# Patient Record
Sex: MALE | Race: OTHER | HISPANIC OR LATINO
[De-identification: names, ages, dates, MRNs, and addresses within clinical notes are randomized per-mention and may not be internally consistent; named-entity substitution may affect disease eponyms.]

---

## 2017-03-17 ENCOUNTER — MED ADMIN CHARGE (OUTPATIENT)
Age: 57
End: 2017-03-17

## 2017-04-12 ENCOUNTER — EMERGENCY (EMERGENCY)
Facility: HOSPITAL | Age: 57
LOS: 1 days | Discharge: HOME | End: 2017-04-12
Attending: EMERGENCY MEDICINE
Payer: COMMERCIAL

## 2017-04-12 VITALS
WEIGHT: 160.06 LBS | TEMPERATURE: 98 F | OXYGEN SATURATION: 100 % | HEART RATE: 75 BPM | HEIGHT: 66 IN | SYSTOLIC BLOOD PRESSURE: 130 MMHG | RESPIRATION RATE: 18 BRPM | DIASTOLIC BLOOD PRESSURE: 85 MMHG

## 2017-04-12 DIAGNOSIS — M25.511 PAIN IN RIGHT SHOULDER: ICD-10-CM

## 2017-04-12 PROCEDURE — 99284 EMERGENCY DEPT VISIT MOD MDM: CPT | Mod: 25

## 2017-04-12 PROCEDURE — 99283 EMERGENCY DEPT VISIT LOW MDM: CPT | Mod: 25

## 2017-04-12 PROCEDURE — 99053 MED SERV 10PM-8AM 24 HR FAC: CPT

## 2017-04-12 PROCEDURE — 96372 THER/PROPH/DIAG INJ SC/IM: CPT

## 2017-04-12 PROCEDURE — 73030 X-RAY EXAM OF SHOULDER: CPT

## 2017-04-12 PROCEDURE — 73030 X-RAY EXAM OF SHOULDER: CPT | Mod: 26,RT

## 2017-04-12 RX ORDER — METHOCARBAMOL 500 MG/1
1500 TABLET, FILM COATED ORAL ONCE
Qty: 0 | Refills: 0 | Status: COMPLETED | OUTPATIENT
Start: 2017-04-12 | End: 2017-04-12

## 2017-04-12 RX ORDER — KETOROLAC TROMETHAMINE 30 MG/ML
60 SYRINGE (ML) INJECTION ONCE
Qty: 0 | Refills: 0 | Status: DISCONTINUED | OUTPATIENT
Start: 2017-04-12 | End: 2017-04-12

## 2017-04-12 RX ORDER — IBUPROFEN 200 MG
1 TABLET ORAL
Qty: 28 | Refills: 0 | OUTPATIENT
Start: 2017-04-12 | End: 2017-04-19

## 2017-04-12 RX ADMIN — METHOCARBAMOL 1500 MILLIGRAM(S): 500 TABLET, FILM COATED ORAL at 06:06

## 2017-04-12 RX ADMIN — Medication 60 MILLIGRAM(S): at 06:06

## 2017-04-12 NOTE — ED PROVIDER NOTE - PHYSICAL EXAMINATION
Musculoskeletal: +TTP of the right posterolateral shoulder with decreased active/passive ROM due to reported pain, 2+ radial pulse, +drop arm test

## 2017-04-12 NOTE — ED PROVIDER NOTE - OBJECTIVE STATEMENT
57M presents to the ED c/o worsening right shoulder pain x 1 week. 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 Pt otherwise denies numbness/tingling, recent trauma/injury and has no other complaints.

## 2017-04-20 ENCOUNTER — APPOINTMENT (OUTPATIENT)
Dept: ORTHOPEDIC SURGERY | Facility: CLINIC | Age: 57
End: 2017-04-20

## 2017-04-20 VITALS
WEIGHT: 160 LBS | HEART RATE: 85 BPM | DIASTOLIC BLOOD PRESSURE: 81 MMHG | BODY MASS INDEX: 29.44 KG/M2 | HEIGHT: 62 IN | TEMPERATURE: 97.9 F | SYSTOLIC BLOOD PRESSURE: 129 MMHG

## 2017-04-20 DIAGNOSIS — Z83.79 FAMILY HISTORY OF OTHER DISEASES OF THE DIGESTIVE SYSTEM: ICD-10-CM

## 2017-04-20 DIAGNOSIS — Z83.3 FAMILY HISTORY OF DIABETES MELLITUS: ICD-10-CM

## 2017-04-20 DIAGNOSIS — Z82.49 FAMILY HISTORY OF ISCHEMIC HEART DISEASE AND OTHER DISEASES OF THE CIRCULATORY SYSTEM: ICD-10-CM

## 2017-04-20 DIAGNOSIS — M19.019 PRIMARY OSTEOARTHRITIS, UNSPECIFIED SHOULDER: ICD-10-CM

## 2017-04-20 DIAGNOSIS — M75.51 BURSITIS OF RIGHT SHOULDER: ICD-10-CM

## 2017-04-20 DIAGNOSIS — Z78.9 OTHER SPECIFIED HEALTH STATUS: ICD-10-CM

## 2017-05-12 ENCOUNTER — NON-APPOINTMENT (OUTPATIENT)
Age: 57
End: 2017-05-12

## 2017-05-12 ENCOUNTER — APPOINTMENT (OUTPATIENT)
Dept: INTERNAL MEDICINE | Facility: CLINIC | Age: 57
End: 2017-05-12

## 2017-05-12 VITALS — DIASTOLIC BLOOD PRESSURE: 78 MMHG | HEART RATE: 70 BPM | SYSTOLIC BLOOD PRESSURE: 118 MMHG | RESPIRATION RATE: 12 BRPM

## 2017-05-12 VITALS — HEIGHT: 64 IN | BODY MASS INDEX: 26.63 KG/M2 | WEIGHT: 156 LBS

## 2017-05-12 DIAGNOSIS — Z78.9 OTHER SPECIFIED HEALTH STATUS: ICD-10-CM

## 2017-05-12 DIAGNOSIS — Z84.89 FAMILY HISTORY OF OTHER SPECIFIED CONDITIONS: ICD-10-CM

## 2017-05-12 DIAGNOSIS — Z82.49 FAMILY HISTORY OF ISCHEMIC HEART DISEASE AND OTHER DISEASES OF THE CIRCULATORY SYSTEM: ICD-10-CM

## 2017-05-12 DIAGNOSIS — Z83.79 FAMILY HISTORY OF OTHER DISEASES OF THE DIGESTIVE SYSTEM: ICD-10-CM

## 2017-05-15 LAB
25(OH)D3 SERPL-MCNC: 19.5 NG/ML
ALBUMIN SERPL ELPH-MCNC: 4.2 G/DL
ALP BLD-CCNC: 83 U/L
ALT SERPL-CCNC: 14 U/L
ANION GAP SERPL CALC-SCNC: 14 MMOL/L
APPEARANCE: CLEAR
AST SERPL-CCNC: 19 U/L
BASOPHILS # BLD AUTO: 0.03 K/UL
BASOPHILS NFR BLD AUTO: 0.8 %
BILIRUB SERPL-MCNC: 0.2 MG/DL
BILIRUBIN URINE: NEGATIVE
BLOOD URINE: NEGATIVE
BUN SERPL-MCNC: 17 MG/DL
CALCIUM SERPL-MCNC: 9 MG/DL
CHLORIDE SERPL-SCNC: 106 MMOL/L
CHOLEST SERPL-MCNC: 240 MG/DL
CHOLEST/HDLC SERPL: 5 RATIO
CO2 SERPL-SCNC: 23 MMOL/L
COLOR: YELLOW
CREAT SERPL-MCNC: 0.85 MG/DL
CREAT SPEC-SCNC: 81 MG/DL
EOSINOPHIL # BLD AUTO: 0.17 K/UL
EOSINOPHIL NFR BLD AUTO: 4.3 %
GLUCOSE QUALITATIVE U: NORMAL MG/DL
GLUCOSE SERPL-MCNC: 75 MG/DL
HBA1C MFR BLD HPLC: 6.2 %
HCT VFR BLD CALC: 35.9 %
HDLC SERPL-MCNC: 47 MG/DL
HGB BLD-MCNC: 11.5 G/DL
IMM GRANULOCYTES NFR BLD AUTO: 0.3 %
KETONES URINE: NEGATIVE
LDLC SERPL CALC-MCNC: 177 MG/DL
LEUKOCYTE ESTERASE URINE: NEGATIVE
LYMPHOCYTES # BLD AUTO: 2.22 K/UL
LYMPHOCYTES NFR BLD AUTO: 56.6 %
MAN DIFF?: NORMAL
MCHC RBC-ENTMCNC: 28.2 PG
MCHC RBC-ENTMCNC: 32 GM/DL
MCV RBC AUTO: 88 FL
MICROALBUMIN 24H UR DL<=1MG/L-MCNC: <0.3 MG/DL
MICROALBUMIN/CREAT 24H UR-RTO: NORMAL
MONOCYTES # BLD AUTO: 0.41 K/UL
MONOCYTES NFR BLD AUTO: 10.5 %
NEUTROPHILS # BLD AUTO: 1.08 K/UL
NEUTROPHILS NFR BLD AUTO: 27.5 %
NITRITE URINE: NEGATIVE
PH URINE: 6.5
PLATELET # BLD AUTO: 222 K/UL
POTASSIUM SERPL-SCNC: 4.4 MMOL/L
PROT SERPL-MCNC: 7.4 G/DL
PROTEIN URINE: NEGATIVE MG/DL
PSA SERPL-MCNC: 1.37 NG/ML
RBC # BLD: 4.08 M/UL
RBC # FLD: 14.9 %
SODIUM SERPL-SCNC: 143 MMOL/L
SPECIFIC GRAVITY URINE: 1.02
T4 FREE SERPL-MCNC: 1.3 NG/DL
TRIGL SERPL-MCNC: 81 MG/DL
TSH SERPL-ACNC: 1.62 UIU/ML
UROBILINOGEN URINE: NORMAL MG/DL
WBC # FLD AUTO: 3.92 K/UL

## 2017-06-14 ENCOUNTER — APPOINTMENT (OUTPATIENT)
Dept: INTERNAL MEDICINE | Facility: CLINIC | Age: 57
End: 2017-06-14

## 2017-06-14 VITALS — DIASTOLIC BLOOD PRESSURE: 68 MMHG | RESPIRATION RATE: 12 BRPM | SYSTOLIC BLOOD PRESSURE: 110 MMHG | HEART RATE: 68 BPM

## 2017-06-14 DIAGNOSIS — S61.219A LACERATION W/OUT FOREIGN BODY OF UNSPECIFIED FINGER W/OUT DAMAGE TO NAIL, INITIAL ENCOUNTER: ICD-10-CM

## 2017-06-23 ENCOUNTER — APPOINTMENT (OUTPATIENT)
Dept: INTERNAL MEDICINE | Facility: CLINIC | Age: 57
End: 2017-06-23

## 2017-06-23 VITALS — WEIGHT: 156 LBS | HEIGHT: 64 IN | BODY MASS INDEX: 26.63 KG/M2

## 2017-06-23 VITALS — SYSTOLIC BLOOD PRESSURE: 108 MMHG | DIASTOLIC BLOOD PRESSURE: 72 MMHG

## 2017-06-23 DIAGNOSIS — Z48.02 ENCOUNTER FOR REMOVAL OF SUTURES: ICD-10-CM

## 2017-06-23 DIAGNOSIS — S61.218D LACERATION W/OUT FOREIGN BODY OF OTHER FINGER W/OUT DAMAGE TO NAIL, SUBSEQUENT ENCOUNTER: ICD-10-CM

## 2017-07-06 ENCOUNTER — APPOINTMENT (OUTPATIENT)
Dept: UROLOGY | Facility: CLINIC | Age: 57
End: 2017-07-06

## 2017-07-06 VITALS
WEIGHT: 156 LBS | SYSTOLIC BLOOD PRESSURE: 129 MMHG | DIASTOLIC BLOOD PRESSURE: 77 MMHG | BODY MASS INDEX: 26.63 KG/M2 | TEMPERATURE: 97.9 F | HEART RATE: 65 BPM | HEIGHT: 64 IN

## 2017-07-06 DIAGNOSIS — R39.12 POOR URINARY STREAM: ICD-10-CM

## 2017-07-06 DIAGNOSIS — Z12.5 ENCOUNTER FOR SCREENING FOR MALIGNANT NEOPLASM OF PROSTATE: ICD-10-CM

## 2017-08-13 ENCOUNTER — TRANSCRIPTION ENCOUNTER (OUTPATIENT)
Age: 57
End: 2017-08-13

## 2017-08-31 ENCOUNTER — APPOINTMENT (OUTPATIENT)
Dept: UROLOGY | Facility: CLINIC | Age: 57
End: 2017-08-31
Payer: COMMERCIAL

## 2017-08-31 VITALS
BODY MASS INDEX: 26.63 KG/M2 | DIASTOLIC BLOOD PRESSURE: 71 MMHG | HEART RATE: 67 BPM | WEIGHT: 156 LBS | SYSTOLIC BLOOD PRESSURE: 126 MMHG | TEMPERATURE: 97.8 F | HEIGHT: 64 IN

## 2017-08-31 DIAGNOSIS — Z98.890 OTHER SPECIFIED POSTPROCEDURAL STATES: ICD-10-CM

## 2017-08-31 DIAGNOSIS — R30.9 PAINFUL MICTURITION, UNSPECIFIED: ICD-10-CM

## 2017-08-31 LAB
BILIRUB UR QL STRIP: NORMAL
CLARITY UR: CLEAR
COLLECTION METHOD: NORMAL
GLUCOSE UR-MCNC: NORMAL
HCG UR QL: 0.2 EU/DL
HGB UR QL STRIP.AUTO: NORMAL
KETONES UR-MCNC: NORMAL
LEUKOCYTE ESTERASE UR QL STRIP: NORMAL
NITRITE UR QL STRIP: NORMAL
PH UR STRIP: 7
PROT UR STRIP-MCNC: NORMAL
SP GR UR STRIP: 1.02

## 2017-08-31 PROCEDURE — 99213 OFFICE O/P EST LOW 20 MIN: CPT | Mod: 25

## 2017-08-31 PROCEDURE — 52000 CYSTOURETHROSCOPY: CPT

## 2017-08-31 PROCEDURE — 81003 URINALYSIS AUTO W/O SCOPE: CPT | Mod: QW

## 2017-09-01 LAB
APPEARANCE: CLEAR
BACTERIA: NEGATIVE
BILIRUBIN URINE: NEGATIVE
BLOOD URINE: NEGATIVE
COLOR: YELLOW
GLUCOSE QUALITATIVE U: NORMAL MG/DL
HYALINE CASTS: 0 /LPF
KETONES URINE: NEGATIVE
LEUKOCYTE ESTERASE URINE: NEGATIVE
MICROSCOPIC-UA: NORMAL
NITRITE URINE: NEGATIVE
PH URINE: 8.5
PROTEIN URINE: NEGATIVE MG/DL
RED BLOOD CELLS URINE: 0 /HPF
SPECIFIC GRAVITY URINE: 1.02
SQUAMOUS EPITHELIAL CELLS: 0 /HPF
UROBILINOGEN URINE: NORMAL MG/DL
WHITE BLOOD CELLS URINE: 0 /HPF

## 2017-09-05 LAB — BACTERIA UR CULT: NORMAL

## 2017-09-08 ENCOUNTER — APPOINTMENT (OUTPATIENT)
Dept: INTERNAL MEDICINE | Facility: CLINIC | Age: 57
End: 2017-09-08
Payer: COMMERCIAL

## 2017-09-08 ENCOUNTER — NON-APPOINTMENT (OUTPATIENT)
Age: 57
End: 2017-09-08

## 2017-09-08 VITALS — HEART RATE: 70 BPM | SYSTOLIC BLOOD PRESSURE: 120 MMHG | DIASTOLIC BLOOD PRESSURE: 76 MMHG | RESPIRATION RATE: 14 BRPM

## 2017-09-08 VITALS — BODY MASS INDEX: 25.95 KG/M2 | HEIGHT: 64 IN | WEIGHT: 152 LBS

## 2017-09-08 PROCEDURE — 99214 OFFICE O/P EST MOD 30 MIN: CPT | Mod: 25

## 2017-09-08 PROCEDURE — 94010 BREATHING CAPACITY TEST: CPT

## 2017-10-12 ENCOUNTER — FORM ENCOUNTER (OUTPATIENT)
Age: 57
End: 2017-10-12

## 2017-10-13 ENCOUNTER — APPOINTMENT (OUTPATIENT)
Dept: RADIOLOGY | Facility: CLINIC | Age: 57
End: 2017-10-13
Payer: COMMERCIAL

## 2017-10-13 ENCOUNTER — APPOINTMENT (OUTPATIENT)
Dept: INTERNAL MEDICINE | Facility: CLINIC | Age: 57
End: 2017-10-13
Payer: COMMERCIAL

## 2017-10-13 ENCOUNTER — OUTPATIENT (OUTPATIENT)
Dept: OUTPATIENT SERVICES | Facility: HOSPITAL | Age: 57
LOS: 1 days | End: 2017-10-13
Payer: COMMERCIAL

## 2017-10-13 ENCOUNTER — NON-APPOINTMENT (OUTPATIENT)
Age: 57
End: 2017-10-13

## 2017-10-13 VITALS — SYSTOLIC BLOOD PRESSURE: 130 MMHG | DIASTOLIC BLOOD PRESSURE: 82 MMHG | HEART RATE: 68 BPM | RESPIRATION RATE: 12 BRPM

## 2017-10-13 VITALS — HEIGHT: 64 IN | BODY MASS INDEX: 25.95 KG/M2 | WEIGHT: 151.99 LBS

## 2017-10-13 DIAGNOSIS — J45.40 MODERATE PERSISTENT ASTHMA, UNCOMPLICATED: ICD-10-CM

## 2017-10-13 PROCEDURE — 71020: CPT | Mod: 26

## 2017-10-13 PROCEDURE — 99214 OFFICE O/P EST MOD 30 MIN: CPT | Mod: 25

## 2017-10-13 PROCEDURE — 71046 X-RAY EXAM CHEST 2 VIEWS: CPT

## 2017-10-13 PROCEDURE — 94010 BREATHING CAPACITY TEST: CPT

## 2017-10-13 PROCEDURE — 90688 IIV4 VACCINE SPLT 0.5 ML IM: CPT

## 2017-10-13 PROCEDURE — G0008: CPT

## 2017-10-13 RX ORDER — METHYLPREDNISOLONE 4 MG/1
4 TABLET ORAL
Qty: 1 | Refills: 3 | Status: DISCONTINUED | COMMUNITY
Start: 2017-09-08 | End: 2017-10-13

## 2017-10-26 ENCOUNTER — APPOINTMENT (OUTPATIENT)
Dept: UROLOGY | Facility: CLINIC | Age: 57
End: 2017-10-26

## 2017-11-10 ENCOUNTER — APPOINTMENT (OUTPATIENT)
Dept: PULMONOLOGY | Facility: CLINIC | Age: 57
End: 2017-11-10
Payer: COMMERCIAL

## 2017-11-10 VITALS
DIASTOLIC BLOOD PRESSURE: 70 MMHG | WEIGHT: 155 LBS | BODY MASS INDEX: 26.46 KG/M2 | SYSTOLIC BLOOD PRESSURE: 118 MMHG | OXYGEN SATURATION: 92 % | HEART RATE: 99 BPM | HEIGHT: 64 IN

## 2017-11-10 DIAGNOSIS — R06.09 OTHER FORMS OF DYSPNEA: ICD-10-CM

## 2017-11-10 PROCEDURE — 94729 DIFFUSING CAPACITY: CPT

## 2017-11-10 PROCEDURE — 94010 BREATHING CAPACITY TEST: CPT

## 2017-11-10 PROCEDURE — 85018 HEMOGLOBIN: CPT | Mod: QW

## 2017-11-10 PROCEDURE — 99204 OFFICE O/P NEW MOD 45 MIN: CPT | Mod: 25

## 2017-11-10 PROCEDURE — 94727 GAS DIL/WSHOT DETER LNG VOL: CPT

## 2017-11-29 ENCOUNTER — APPOINTMENT (OUTPATIENT)
Dept: INTERNAL MEDICINE | Facility: CLINIC | Age: 57
End: 2017-11-29

## 2018-02-09 ENCOUNTER — APPOINTMENT (OUTPATIENT)
Dept: PULMONOLOGY | Facility: CLINIC | Age: 58
End: 2018-02-09

## 2018-07-27 ENCOUNTER — MEDICATION RENEWAL (OUTPATIENT)
Age: 58
End: 2018-07-27

## 2018-08-06 ENCOUNTER — NON-APPOINTMENT (OUTPATIENT)
Age: 58
End: 2018-08-06

## 2018-08-06 ENCOUNTER — APPOINTMENT (OUTPATIENT)
Dept: INTERNAL MEDICINE | Facility: CLINIC | Age: 58
End: 2018-08-06
Payer: COMMERCIAL

## 2018-08-06 ENCOUNTER — RECORD ABSTRACTING (OUTPATIENT)
Age: 58
End: 2018-08-06

## 2018-08-06 VITALS — SYSTOLIC BLOOD PRESSURE: 120 MMHG | RESPIRATION RATE: 14 BRPM | HEART RATE: 78 BPM | DIASTOLIC BLOOD PRESSURE: 78 MMHG

## 2018-08-06 VITALS — WEIGHT: 156 LBS | BODY MASS INDEX: 26.78 KG/M2

## 2018-08-06 DIAGNOSIS — L30.9 DERMATITIS, UNSPECIFIED: ICD-10-CM

## 2018-08-06 PROCEDURE — 94010 BREATHING CAPACITY TEST: CPT

## 2018-08-06 PROCEDURE — G0009: CPT

## 2018-08-06 PROCEDURE — 36415 COLL VENOUS BLD VENIPUNCTURE: CPT

## 2018-08-06 PROCEDURE — 99214 OFFICE O/P EST MOD 30 MIN: CPT | Mod: 25

## 2018-08-06 PROCEDURE — 90670 PCV13 VACCINE IM: CPT

## 2018-08-06 RX ORDER — ALBUTEROL SULFATE 90 UG/1
108 (90 BASE) AEROSOL, METERED RESPIRATORY (INHALATION)
Qty: 1 | Refills: 5 | Status: ACTIVE | COMMUNITY
Start: 2017-11-10 | End: 1900-01-01

## 2018-08-06 RX ORDER — PAROXETINE HYDROCHLORIDE 10 MG/1
10 TABLET, FILM COATED ORAL DAILY
Qty: 30 | Refills: 2 | Status: DISCONTINUED | COMMUNITY
Start: 2017-10-13 | End: 2018-08-06

## 2018-08-06 RX ORDER — CIPROFLOXACIN HYDROCHLORIDE 500 MG/1
500 TABLET, FILM COATED ORAL TWICE DAILY
Qty: 6 | Refills: 0 | Status: DISCONTINUED | COMMUNITY
Start: 2017-08-31 | End: 2018-08-06

## 2018-08-06 NOTE — ASSESSMENT
[FreeTextEntry1] : asthma persistent\par restart meds breo and proair\par see uro for issues with bladder/prostate\par has been taking meds for it not completely better\par exzema cream\par neck pain nsaids\par pneumovax given

## 2018-08-06 NOTE — HISTORY OF PRESENT ILLNESS
[FreeTextEntry1] : patient here for follow up medical issues\par due for cpe end of month\par has asthma in the past [de-identified] : patient here for asthma follow up\par patient has been at baseline.\par still has asthma work related\par also having issues with urine flow, did not follow up with gu\par patient has no chest pain sob nvd or palpitations\par also has some neck pain and a rash of his hand

## 2018-08-07 ENCOUNTER — CHART COPY (OUTPATIENT)
Age: 58
End: 2018-08-07

## 2018-08-07 LAB
25(OH)D3 SERPL-MCNC: 21.2 NG/ML
ALBUMIN SERPL ELPH-MCNC: 4.5 G/DL
ALP BLD-CCNC: 91 U/L
ALT SERPL-CCNC: 20 U/L
ANION GAP SERPL CALC-SCNC: 11 MMOL/L
APPEARANCE: CLEAR
AST SERPL-CCNC: 22 U/L
BASOPHILS # BLD AUTO: 0.02 K/UL
BASOPHILS NFR BLD AUTO: 0.4 %
BILIRUB SERPL-MCNC: 0.2 MG/DL
BILIRUBIN URINE: NEGATIVE
BLOOD URINE: NEGATIVE
BUN SERPL-MCNC: 13 MG/DL
CALCIUM SERPL-MCNC: 9.5 MG/DL
CHLORIDE SERPL-SCNC: 103 MMOL/L
CHOLEST SERPL-MCNC: 239 MG/DL
CHOLEST/HDLC SERPL: 5 RATIO
CO2 SERPL-SCNC: 26 MMOL/L
COLOR: YELLOW
CREAT SERPL-MCNC: 0.88 MG/DL
CREAT SPEC-SCNC: 71 MG/DL
EOSINOPHIL # BLD AUTO: 0.22 K/UL
EOSINOPHIL NFR BLD AUTO: 4 %
GLUCOSE QUALITATIVE U: NEGATIVE MG/DL
GLUCOSE SERPL-MCNC: 120 MG/DL
HBA1C MFR BLD HPLC: 6.2 %
HCT VFR BLD CALC: 44.6 %
HDLC SERPL-MCNC: 48 MG/DL
HGB BLD-MCNC: 14 G/DL
IMM GRANULOCYTES NFR BLD AUTO: 0.2 %
KETONES URINE: NEGATIVE
LDLC SERPL CALC-MCNC: 162 MG/DL
LEUKOCYTE ESTERASE URINE: NEGATIVE
LYMPHOCYTES # BLD AUTO: 2.64 K/UL
LYMPHOCYTES NFR BLD AUTO: 48.2 %
MAN DIFF?: NORMAL
MCHC RBC-ENTMCNC: 28.3 PG
MCHC RBC-ENTMCNC: 31.4 GM/DL
MCV RBC AUTO: 90.3 FL
MICROALBUMIN 24H UR DL<=1MG/L-MCNC: <1.2 MG/DL
MICROALBUMIN/CREAT 24H UR-RTO: NORMAL
MONOCYTES # BLD AUTO: 0.44 K/UL
MONOCYTES NFR BLD AUTO: 8 %
NEUTROPHILS # BLD AUTO: 2.15 K/UL
NEUTROPHILS NFR BLD AUTO: 39.2 %
NITRITE URINE: NEGATIVE
PH URINE: 6
PLATELET # BLD AUTO: 248 K/UL
POTASSIUM SERPL-SCNC: 5.5 MMOL/L
PROT SERPL-MCNC: 7.7 G/DL
PROTEIN URINE: NEGATIVE MG/DL
PSA SERPL-MCNC: 2.09 NG/ML
RBC # BLD: 4.94 M/UL
RBC # FLD: 16.1 %
SODIUM SERPL-SCNC: 140 MMOL/L
SPECIFIC GRAVITY URINE: 1.02
T4 FREE SERPL-MCNC: 1.5 NG/DL
TRIGL SERPL-MCNC: 147 MG/DL
TSH SERPL-ACNC: 1.78 UIU/ML
UROBILINOGEN URINE: NEGATIVE MG/DL
WBC # FLD AUTO: 5.48 K/UL

## 2018-08-28 ENCOUNTER — APPOINTMENT (OUTPATIENT)
Dept: INTERNAL MEDICINE | Facility: CLINIC | Age: 58
End: 2018-08-28

## 2018-10-11 ENCOUNTER — APPOINTMENT (OUTPATIENT)
Dept: INTERNAL MEDICINE | Facility: CLINIC | Age: 58
End: 2018-10-11

## 2018-10-18 ENCOUNTER — APPOINTMENT (OUTPATIENT)
Dept: UROLOGY | Facility: CLINIC | Age: 58
End: 2018-10-18

## 2018-10-18 ENCOUNTER — APPOINTMENT (OUTPATIENT)
Dept: UROLOGY | Facility: CLINIC | Age: 58
End: 2018-10-18
Payer: COMMERCIAL

## 2018-10-18 VITALS
HEIGHT: 64 IN | RESPIRATION RATE: 14 BRPM | BODY MASS INDEX: 27.31 KG/M2 | WEIGHT: 160 LBS | SYSTOLIC BLOOD PRESSURE: 131 MMHG | HEART RATE: 68 BPM | DIASTOLIC BLOOD PRESSURE: 83 MMHG

## 2018-10-18 LAB
BILIRUB UR QL STRIP: NORMAL
CLARITY UR: CLEAR
COLLECTION METHOD: NORMAL
GLUCOSE UR-MCNC: NORMAL
HCG UR QL: 0.2 EU/DL
HGB UR QL STRIP.AUTO: NORMAL
KETONES UR-MCNC: NORMAL
LEUKOCYTE ESTERASE UR QL STRIP: NORMAL
NITRITE UR QL STRIP: NORMAL
PH UR STRIP: 7
PROT UR STRIP-MCNC: NORMAL
SP GR UR STRIP: <=1.005

## 2018-10-18 PROCEDURE — 51798 US URINE CAPACITY MEASURE: CPT | Mod: 59

## 2018-10-18 PROCEDURE — 81003 URINALYSIS AUTO W/O SCOPE: CPT | Mod: QW

## 2018-10-18 PROCEDURE — 99214 OFFICE O/P EST MOD 30 MIN: CPT | Mod: 25

## 2018-10-18 PROCEDURE — 51741 ELECTRO-UROFLOWMETRY FIRST: CPT

## 2018-11-15 ENCOUNTER — APPOINTMENT (OUTPATIENT)
Dept: UROLOGY | Facility: CLINIC | Age: 58
End: 2018-11-15

## 2018-12-13 ENCOUNTER — APPOINTMENT (OUTPATIENT)
Dept: UROLOGY | Facility: CLINIC | Age: 58
End: 2018-12-13
Payer: COMMERCIAL

## 2018-12-13 VITALS
BODY MASS INDEX: 27.31 KG/M2 | TEMPERATURE: 97.5 F | HEART RATE: 57 BPM | DIASTOLIC BLOOD PRESSURE: 96 MMHG | HEIGHT: 64 IN | SYSTOLIC BLOOD PRESSURE: 116 MMHG | WEIGHT: 160 LBS

## 2018-12-13 PROCEDURE — 76872 US TRANSRECTAL: CPT

## 2018-12-30 ENCOUNTER — EMERGENCY (EMERGENCY)
Facility: HOSPITAL | Age: 58
LOS: 1 days | Discharge: HOME | End: 2018-12-30
Attending: EMERGENCY MEDICINE
Payer: COMMERCIAL

## 2018-12-30 VITALS
HEART RATE: 92 BPM | RESPIRATION RATE: 18 BRPM | OXYGEN SATURATION: 97 % | SYSTOLIC BLOOD PRESSURE: 108 MMHG | TEMPERATURE: 99 F | DIASTOLIC BLOOD PRESSURE: 78 MMHG

## 2018-12-30 VITALS
WEIGHT: 156.09 LBS | SYSTOLIC BLOOD PRESSURE: 106 MMHG | HEIGHT: 66 IN | OXYGEN SATURATION: 96 % | TEMPERATURE: 98 F | RESPIRATION RATE: 18 BRPM | HEART RATE: 108 BPM | DIASTOLIC BLOOD PRESSURE: 70 MMHG

## 2018-12-30 LAB
ALBUMIN SERPL ELPH-MCNC: 4.3 G/DL — SIGNIFICANT CHANGE UP (ref 3.3–5.2)
ALP SERPL-CCNC: 84 U/L — SIGNIFICANT CHANGE UP (ref 40–120)
ALT FLD-CCNC: 18 U/L — SIGNIFICANT CHANGE UP
ANION GAP SERPL CALC-SCNC: 12 MMOL/L — SIGNIFICANT CHANGE UP (ref 5–17)
APPEARANCE UR: CLEAR — SIGNIFICANT CHANGE UP
AST SERPL-CCNC: 18 U/L — SIGNIFICANT CHANGE UP
BACTERIA # UR AUTO: ABNORMAL
BASOPHILS # BLD AUTO: 0 K/UL — SIGNIFICANT CHANGE UP (ref 0–0.2)
BASOPHILS NFR BLD AUTO: 0.1 % — SIGNIFICANT CHANGE UP (ref 0–2)
BILIRUB SERPL-MCNC: 0.4 MG/DL — SIGNIFICANT CHANGE UP (ref 0.4–2)
BILIRUB UR-MCNC: NEGATIVE — SIGNIFICANT CHANGE UP
BUN SERPL-MCNC: 16 MG/DL — SIGNIFICANT CHANGE UP (ref 8–20)
CALCIUM SERPL-MCNC: 9.2 MG/DL — SIGNIFICANT CHANGE UP (ref 8.6–10.2)
CHLORIDE SERPL-SCNC: 102 MMOL/L — SIGNIFICANT CHANGE UP (ref 98–107)
CO2 SERPL-SCNC: 22 MMOL/L — SIGNIFICANT CHANGE UP (ref 22–29)
COLOR SPEC: YELLOW — SIGNIFICANT CHANGE UP
CREAT SERPL-MCNC: 0.83 MG/DL — SIGNIFICANT CHANGE UP (ref 0.5–1.3)
DIFF PNL FLD: ABNORMAL
EOSINOPHIL # BLD AUTO: 0 K/UL — SIGNIFICANT CHANGE UP (ref 0–0.5)
EOSINOPHIL NFR BLD AUTO: 0 % — SIGNIFICANT CHANGE UP (ref 0–5)
EPI CELLS # UR: SIGNIFICANT CHANGE UP
GLUCOSE SERPL-MCNC: 123 MG/DL — HIGH (ref 70–115)
GLUCOSE UR QL: NEGATIVE MG/DL — SIGNIFICANT CHANGE UP
HCT VFR BLD CALC: 39.9 % — LOW (ref 42–52)
HGB BLD-MCNC: 13.5 G/DL — LOW (ref 14–18)
KETONES UR-MCNC: NEGATIVE — SIGNIFICANT CHANGE UP
LACTATE BLDV-MCNC: 1.9 MMOL/L — SIGNIFICANT CHANGE UP (ref 0.5–2)
LEUKOCYTE ESTERASE UR-ACNC: ABNORMAL
LYMPHOCYTES # BLD AUTO: 1.3 K/UL — SIGNIFICANT CHANGE UP (ref 1–4.8)
LYMPHOCYTES # BLD AUTO: 6.2 % — LOW (ref 20–55)
MCHC RBC-ENTMCNC: 30.3 PG — SIGNIFICANT CHANGE UP (ref 27–31)
MCHC RBC-ENTMCNC: 33.8 G/DL — SIGNIFICANT CHANGE UP (ref 32–36)
MCV RBC AUTO: 89.5 FL — SIGNIFICANT CHANGE UP (ref 80–94)
MONOCYTES # BLD AUTO: 1.5 K/UL — HIGH (ref 0–0.8)
MONOCYTES NFR BLD AUTO: 7.2 % — SIGNIFICANT CHANGE UP (ref 3–10)
NEUTROPHILS # BLD AUTO: 18.2 K/UL — HIGH (ref 1.8–8)
NEUTROPHILS NFR BLD AUTO: 86.1 % — HIGH (ref 37–73)
NITRITE UR-MCNC: NEGATIVE — SIGNIFICANT CHANGE UP
PH UR: 5 — SIGNIFICANT CHANGE UP (ref 5–8)
PLATELET # BLD AUTO: 217 K/UL — SIGNIFICANT CHANGE UP (ref 150–400)
POTASSIUM SERPL-MCNC: 4.7 MMOL/L — SIGNIFICANT CHANGE UP (ref 3.5–5.3)
POTASSIUM SERPL-SCNC: 4.7 MMOL/L — SIGNIFICANT CHANGE UP (ref 3.5–5.3)
PROT SERPL-MCNC: 7.6 G/DL — SIGNIFICANT CHANGE UP (ref 6.6–8.7)
PROT UR-MCNC: 15 MG/DL
RBC # BLD: 4.46 M/UL — LOW (ref 4.6–6.2)
RBC # FLD: 14.1 % — SIGNIFICANT CHANGE UP (ref 11–15.6)
RBC CASTS # UR COMP ASSIST: ABNORMAL /HPF (ref 0–4)
SODIUM SERPL-SCNC: 136 MMOL/L — SIGNIFICANT CHANGE UP (ref 135–145)
SP GR SPEC: 1.02 — SIGNIFICANT CHANGE UP (ref 1.01–1.02)
UROBILINOGEN FLD QL: NEGATIVE MG/DL — SIGNIFICANT CHANGE UP
WBC # BLD: 21.2 K/UL — HIGH (ref 4.8–10.8)
WBC # FLD AUTO: 21.2 K/UL — HIGH (ref 4.8–10.8)
WBC UR QL: ABNORMAL

## 2018-12-30 PROCEDURE — 51702 INSERT TEMP BLADDER CATH: CPT

## 2018-12-30 PROCEDURE — 87086 URINE CULTURE/COLONY COUNT: CPT

## 2018-12-30 PROCEDURE — 96365 THER/PROPH/DIAG IV INF INIT: CPT | Mod: XU

## 2018-12-30 PROCEDURE — 99284 EMERGENCY DEPT VISIT MOD MDM: CPT | Mod: 25

## 2018-12-30 PROCEDURE — 74177 CT ABD & PELVIS W/CONTRAST: CPT

## 2018-12-30 PROCEDURE — 76770 US EXAM ABDO BACK WALL COMP: CPT | Mod: 26

## 2018-12-30 PROCEDURE — 83605 ASSAY OF LACTIC ACID: CPT

## 2018-12-30 PROCEDURE — 36415 COLL VENOUS BLD VENIPUNCTURE: CPT

## 2018-12-30 PROCEDURE — 87040 BLOOD CULTURE FOR BACTERIA: CPT

## 2018-12-30 PROCEDURE — 87186 SC STD MICRODIL/AGAR DIL: CPT

## 2018-12-30 PROCEDURE — 76770 US EXAM ABDO BACK WALL COMP: CPT

## 2018-12-30 PROCEDURE — 74177 CT ABD & PELVIS W/CONTRAST: CPT | Mod: 26

## 2018-12-30 PROCEDURE — 87150 DNA/RNA AMPLIFIED PROBE: CPT

## 2018-12-30 PROCEDURE — 85027 COMPLETE CBC AUTOMATED: CPT

## 2018-12-30 PROCEDURE — 81001 URINALYSIS AUTO W/SCOPE: CPT

## 2018-12-30 PROCEDURE — 80053 COMPREHEN METABOLIC PANEL: CPT

## 2018-12-30 RX ORDER — CEFTRIAXONE 500 MG/1
1 INJECTION, POWDER, FOR SOLUTION INTRAMUSCULAR; INTRAVENOUS ONCE
Qty: 0 | Refills: 0 | Status: COMPLETED | OUTPATIENT
Start: 2018-12-30 | End: 2018-12-30

## 2018-12-30 RX ORDER — KETOROLAC TROMETHAMINE 30 MG/ML
1 SYRINGE (ML) INJECTION
Qty: 21 | Refills: 0 | OUTPATIENT
Start: 2018-12-30 | End: 2019-01-05

## 2018-12-30 RX ORDER — CEPHALEXIN 500 MG
1 CAPSULE ORAL
Qty: 30 | Refills: 0 | OUTPATIENT
Start: 2018-12-30 | End: 2019-01-08

## 2018-12-30 RX ORDER — TAMSULOSIN HYDROCHLORIDE 0.4 MG/1
0.4 CAPSULE ORAL ONCE
Qty: 0 | Refills: 0 | Status: COMPLETED | OUTPATIENT
Start: 2018-12-30 | End: 2018-12-30

## 2018-12-30 RX ORDER — SODIUM CHLORIDE 9 MG/ML
1000 INJECTION INTRAMUSCULAR; INTRAVENOUS; SUBCUTANEOUS ONCE
Qty: 0 | Refills: 0 | Status: COMPLETED | OUTPATIENT
Start: 2018-12-30 | End: 2018-12-30

## 2018-12-30 RX ADMIN — TAMSULOSIN HYDROCHLORIDE 0.4 MILLIGRAM(S): 0.4 CAPSULE ORAL at 09:01

## 2018-12-30 RX ADMIN — CEFTRIAXONE 100 GRAM(S): 500 INJECTION, POWDER, FOR SOLUTION INTRAMUSCULAR; INTRAVENOUS at 10:30

## 2018-12-30 RX ADMIN — SODIUM CHLORIDE 2000 MILLILITER(S): 9 INJECTION INTRAMUSCULAR; INTRAVENOUS; SUBCUTANEOUS at 10:28

## 2018-12-30 RX ADMIN — CEFTRIAXONE 1 GRAM(S): 500 INJECTION, POWDER, FOR SOLUTION INTRAMUSCULAR; INTRAVENOUS at 11:10

## 2018-12-30 RX ADMIN — SODIUM CHLORIDE 1000 MILLILITER(S): 9 INJECTION INTRAMUSCULAR; INTRAVENOUS; SUBCUTANEOUS at 11:30

## 2018-12-30 NOTE — ED PROVIDER NOTE - PROGRESS NOTE DETAILS
labs reviewed-wbc of 21 and ua noted for some wbc and leuk esterase-epididmytis/ prostatis? Will give rocephin and get blood cx pt with some improvement on reassessment. Will discharge on keflex TID x 10 days. CT abd/pelvis negative. DC francisco

## 2018-12-30 NOTE — ED PROVIDER NOTE - CARE PLAN
Principal Discharge DX:	Prostatitis, acute  Goal:	complete course of abx  Assessment and plan of treatment:	abx  contnue with flomax/ finasterdie  urology follow up

## 2018-12-30 NOTE — ED PROVIDER NOTE - MEDICAL DECISION MAKING DETAILS
58 year old male with BPH and questionable urinary retention- will insert francisco, get labs and get a kidney/bladder sono

## 2018-12-30 NOTE — ED PROVIDER NOTE - ATTENDING CONTRIBUTION TO CARE
I personally saw the patient with the resident, and completed the key components of the history and physical exam. I then discussed the management plan with the resident.     seen with resident: well appearing adult male, non toxic; clear conjunctiva, normal mucosa; normal heart and lung sounds; abd soft NT ND; c/o urinary hesitancy, pelvic pain and back pain; no fever; no urinary retention (only 100cc out from francisco); no clear pyelo on ct; isolated elevated WBC; cultures sent; d/c on antibx for presumed UTI/prostatitis; ok for d/c with precautions

## 2018-12-30 NOTE — ED PROVIDER NOTE - OBJECTIVE STATEMENT
58 year old male pt with hx of BPH(on flomax and finasteride) presents to ED with 2 day hx of severe suprapubic pain, dysuria, frequency and dribbling associated with some back pain and testicular fullness. Denies any fever, chills, blood in urine, nausea, vomiting. Pt saw his Urologist(domingo) within a month and was told if medications are not helping then surgery may be an option. Pt always has discomfort at baseline but pain more severe over the past 2 days.

## 2018-12-30 NOTE — ED ADULT NURSE NOTE - OBJECTIVE STATEMENT
Pt c/o urinary difficulty and burning worsening today. Pt has hx of enlarged prostate and is on Flomax and Fenestrate however pt states it is not helping and his urologist told him he might have to get his prostate removed. Per daughter, pt's urologist is Deandre from [place]. Pt A & Ox4, urine sample collected and sent.

## 2018-12-31 ENCOUNTER — INPATIENT (INPATIENT)
Facility: HOSPITAL | Age: 58
LOS: 3 days | Discharge: HOME | DRG: 872 | End: 2019-01-04
Attending: INTERNAL MEDICINE | Admitting: GENERAL ACUTE CARE HOSPITAL
Payer: COMMERCIAL

## 2018-12-31 ENCOUNTER — CHART COPY (OUTPATIENT)
Age: 58
End: 2018-12-31

## 2018-12-31 VITALS
TEMPERATURE: 98 F | DIASTOLIC BLOOD PRESSURE: 71 MMHG | SYSTOLIC BLOOD PRESSURE: 125 MMHG | HEIGHT: 65 IN | RESPIRATION RATE: 18 BRPM | OXYGEN SATURATION: 100 % | WEIGHT: 154.98 LBS | HEART RATE: 86 BPM

## 2018-12-31 DIAGNOSIS — A41.9 SEPSIS, UNSPECIFIED ORGANISM: ICD-10-CM

## 2018-12-31 LAB
ALBUMIN SERPL ELPH-MCNC: 4 G/DL — SIGNIFICANT CHANGE UP (ref 3.3–5.2)
ALP SERPL-CCNC: 97 U/L — SIGNIFICANT CHANGE UP (ref 40–120)
ALT FLD-CCNC: 19 U/L — SIGNIFICANT CHANGE UP
ANION GAP SERPL CALC-SCNC: 10 MMOL/L — SIGNIFICANT CHANGE UP (ref 5–17)
APTT BLD: 30 SEC — SIGNIFICANT CHANGE UP (ref 27.5–36.3)
AST SERPL-CCNC: 18 U/L — SIGNIFICANT CHANGE UP
BASOPHILS # BLD AUTO: 0 K/UL — SIGNIFICANT CHANGE UP (ref 0–0.2)
BASOPHILS NFR BLD AUTO: 0.1 % — SIGNIFICANT CHANGE UP (ref 0–2)
BILIRUB SERPL-MCNC: 0.6 MG/DL — SIGNIFICANT CHANGE UP (ref 0.4–2)
BUN SERPL-MCNC: 15 MG/DL — SIGNIFICANT CHANGE UP (ref 8–20)
CALCIUM SERPL-MCNC: 8.9 MG/DL — SIGNIFICANT CHANGE UP (ref 8.6–10.2)
CHLORIDE SERPL-SCNC: 100 MMOL/L — SIGNIFICANT CHANGE UP (ref 98–107)
CO2 SERPL-SCNC: 24 MMOL/L — SIGNIFICANT CHANGE UP (ref 22–29)
CREAT SERPL-MCNC: 0.91 MG/DL — SIGNIFICANT CHANGE UP (ref 0.5–1.3)
E COLI DNA BLD POS QL NAA+NON-PROBE: SIGNIFICANT CHANGE UP
EOSINOPHIL # BLD AUTO: 0 K/UL — SIGNIFICANT CHANGE UP (ref 0–0.5)
EOSINOPHIL NFR BLD AUTO: 0 % — SIGNIFICANT CHANGE UP (ref 0–5)
GLUCOSE SERPL-MCNC: 102 MG/DL — SIGNIFICANT CHANGE UP (ref 70–115)
HCT VFR BLD CALC: 39.6 % — LOW (ref 42–52)
HGB BLD-MCNC: 13.1 G/DL — LOW (ref 14–18)
INR BLD: 1.4 RATIO — HIGH (ref 0.88–1.16)
LACTATE BLDV-MCNC: 1 MMOL/L — SIGNIFICANT CHANGE UP (ref 0.5–2)
LACTATE BLDV-MCNC: 2.2 MMOL/L — HIGH (ref 0.5–2)
LYMPHOCYTES # BLD AUTO: 2 K/UL — SIGNIFICANT CHANGE UP (ref 1–4.8)
LYMPHOCYTES # BLD AUTO: 9.1 % — LOW (ref 20–55)
MCHC RBC-ENTMCNC: 29.7 PG — SIGNIFICANT CHANGE UP (ref 27–31)
MCHC RBC-ENTMCNC: 33.1 G/DL — SIGNIFICANT CHANGE UP (ref 32–36)
MCV RBC AUTO: 89.8 FL — SIGNIFICANT CHANGE UP (ref 80–94)
METHOD TYPE: SIGNIFICANT CHANGE UP
MONOCYTES # BLD AUTO: 1.4 K/UL — HIGH (ref 0–0.8)
MONOCYTES NFR BLD AUTO: 6.3 % — SIGNIFICANT CHANGE UP (ref 3–10)
NEUTROPHILS # BLD AUTO: 18.9 K/UL — HIGH (ref 1.8–8)
NEUTROPHILS NFR BLD AUTO: 84.1 % — HIGH (ref 37–73)
PLATELET # BLD AUTO: 192 K/UL — SIGNIFICANT CHANGE UP (ref 150–400)
POTASSIUM SERPL-MCNC: 4.4 MMOL/L — SIGNIFICANT CHANGE UP (ref 3.5–5.3)
POTASSIUM SERPL-SCNC: 4.4 MMOL/L — SIGNIFICANT CHANGE UP (ref 3.5–5.3)
PROT SERPL-MCNC: 7.8 G/DL — SIGNIFICANT CHANGE UP (ref 6.6–8.7)
PROTHROM AB SERPL-ACNC: 16.3 SEC — HIGH (ref 10–12.9)
RBC # BLD: 4.41 M/UL — LOW (ref 4.6–6.2)
RBC # FLD: 14.6 % — SIGNIFICANT CHANGE UP (ref 11–15.6)
SODIUM SERPL-SCNC: 134 MMOL/L — LOW (ref 135–145)
WBC # BLD: 22.5 K/UL — HIGH (ref 4.8–10.8)
WBC # FLD AUTO: 22.5 K/UL — HIGH (ref 4.8–10.8)

## 2018-12-31 PROCEDURE — 99223 1ST HOSP IP/OBS HIGH 75: CPT

## 2018-12-31 PROCEDURE — 93010 ELECTROCARDIOGRAM REPORT: CPT

## 2018-12-31 PROCEDURE — 99284 EMERGENCY DEPT VISIT MOD MDM: CPT

## 2018-12-31 RX ORDER — TAMSULOSIN HYDROCHLORIDE 0.4 MG/1
0.8 CAPSULE ORAL AT BEDTIME
Qty: 0 | Refills: 0 | Status: DISCONTINUED | OUTPATIENT
Start: 2018-12-31 | End: 2019-01-04

## 2018-12-31 RX ORDER — MEROPENEM 1 G/30ML
500 INJECTION INTRAVENOUS ONCE
Qty: 0 | Refills: 0 | Status: COMPLETED | OUTPATIENT
Start: 2018-12-31 | End: 2018-12-31

## 2018-12-31 RX ORDER — OXYCODONE AND ACETAMINOPHEN 5; 325 MG/1; MG/1
2 TABLET ORAL EVERY 6 HOURS
Qty: 0 | Refills: 0 | Status: DISCONTINUED | OUTPATIENT
Start: 2018-12-31 | End: 2019-01-04

## 2018-12-31 RX ORDER — OXYCODONE AND ACETAMINOPHEN 5; 325 MG/1; MG/1
1 TABLET ORAL EVERY 4 HOURS
Qty: 0 | Refills: 0 | Status: DISCONTINUED | OUTPATIENT
Start: 2018-12-31 | End: 2019-01-04

## 2018-12-31 RX ORDER — FINASTERIDE 5 MG/1
5 TABLET, FILM COATED ORAL DAILY
Qty: 0 | Refills: 0 | Status: DISCONTINUED | OUTPATIENT
Start: 2018-12-31 | End: 2019-01-04

## 2018-12-31 RX ORDER — CEFTRIAXONE 500 MG/1
1 INJECTION, POWDER, FOR SOLUTION INTRAMUSCULAR; INTRAVENOUS ONCE
Qty: 0 | Refills: 0 | Status: COMPLETED | OUTPATIENT
Start: 2018-12-31 | End: 2018-12-31

## 2018-12-31 RX ORDER — SODIUM CHLORIDE 9 MG/ML
2100 INJECTION, SOLUTION INTRAVENOUS ONCE
Qty: 0 | Refills: 0 | Status: COMPLETED | OUTPATIENT
Start: 2018-12-31 | End: 2018-12-31

## 2018-12-31 RX ORDER — MEROPENEM 1 G/30ML
500 INJECTION INTRAVENOUS EVERY 8 HOURS
Qty: 0 | Refills: 0 | Status: DISCONTINUED | OUTPATIENT
Start: 2018-12-31 | End: 2019-01-02

## 2018-12-31 RX ORDER — MEROPENEM 1 G/30ML
INJECTION INTRAVENOUS
Qty: 0 | Refills: 0 | Status: DISCONTINUED | OUTPATIENT
Start: 2018-12-31 | End: 2019-01-02

## 2018-12-31 RX ORDER — ACETAMINOPHEN 500 MG
975 TABLET ORAL ONCE
Qty: 0 | Refills: 0 | Status: COMPLETED | OUTPATIENT
Start: 2018-12-31 | End: 2018-12-31

## 2018-12-31 RX ORDER — SACCHAROMYCES BOULARDII 250 MG
250 POWDER IN PACKET (EA) ORAL
Qty: 0 | Refills: 0 | Status: DISCONTINUED | OUTPATIENT
Start: 2018-12-31 | End: 2019-01-04

## 2018-12-31 RX ORDER — ACETAMINOPHEN 500 MG
650 TABLET ORAL EVERY 6 HOURS
Qty: 0 | Refills: 0 | Status: DISCONTINUED | OUTPATIENT
Start: 2018-12-31 | End: 2019-01-04

## 2018-12-31 RX ORDER — ONDANSETRON 8 MG/1
4 TABLET, FILM COATED ORAL EVERY 6 HOURS
Qty: 0 | Refills: 0 | Status: DISCONTINUED | OUTPATIENT
Start: 2018-12-31 | End: 2019-01-04

## 2018-12-31 RX ORDER — SODIUM CHLORIDE 9 MG/ML
1000 INJECTION INTRAMUSCULAR; INTRAVENOUS; SUBCUTANEOUS
Qty: 0 | Refills: 0 | Status: DISCONTINUED | OUTPATIENT
Start: 2018-12-31 | End: 2019-01-03

## 2018-12-31 RX ADMIN — SODIUM CHLORIDE 2100 MILLILITER(S): 9 INJECTION, SOLUTION INTRAVENOUS at 12:43

## 2018-12-31 RX ADMIN — TAMSULOSIN HYDROCHLORIDE 0.8 MILLIGRAM(S): 0.4 CAPSULE ORAL at 23:41

## 2018-12-31 RX ADMIN — MEROPENEM 100 MILLIGRAM(S): 1 INJECTION INTRAVENOUS at 17:23

## 2018-12-31 RX ADMIN — Medication 250 MILLIGRAM(S): at 17:25

## 2018-12-31 RX ADMIN — FINASTERIDE 5 MILLIGRAM(S): 5 TABLET, FILM COATED ORAL at 17:25

## 2018-12-31 RX ADMIN — SODIUM CHLORIDE 125 MILLILITER(S): 9 INJECTION INTRAMUSCULAR; INTRAVENOUS; SUBCUTANEOUS at 17:23

## 2018-12-31 RX ADMIN — CEFTRIAXONE 100 GRAM(S): 500 INJECTION, POWDER, FOR SOLUTION INTRAMUSCULAR; INTRAVENOUS at 12:47

## 2018-12-31 RX ADMIN — CEFTRIAXONE 1 GRAM(S): 500 INJECTION, POWDER, FOR SOLUTION INTRAMUSCULAR; INTRAVENOUS at 13:20

## 2018-12-31 RX ADMIN — Medication 650 MILLIGRAM(S): at 16:51

## 2018-12-31 RX ADMIN — OXYCODONE AND ACETAMINOPHEN 2 TABLET(S): 5; 325 TABLET ORAL at 19:49

## 2018-12-31 RX ADMIN — Medication 975 MILLIGRAM(S): at 12:47

## 2018-12-31 NOTE — ED PROVIDER NOTE - ATTENDING CONTRIBUTION TO CARE
I, Dr. Clements, performed a face to face bedside interview with this patient regarding history of present illness, review of symptoms and relevant past medical, social and family history.  I completed an independent physical examination.  I have also reviewed the resident's note(s) and discussed the plan with the resident.

## 2018-12-31 NOTE — ED STATDOCS - MEDICAL DECISION MAKING DETAILS
Repeat VS, pt tachycardic, febrile, and tachypneic. Sent to [facility] for further evaluation. Repeat VS, pt tachycardic, febrile, and tachypneic. Sent to main as code sepsis.

## 2018-12-31 NOTE — H&P ADULT - ASSESSMENT
1) sepsis secondary to gram neg uti --> admit to medicine  --> resume iv ceft 2gram   --> repeat cultures sent  --> id consulted    2) gram neg bacteremia --> plan as per #!    3) gram neg uti --> repeat urine culture ordered    4) bph --> c.w home meds  --> inc flomax to 0.8mg  --> requestig outpatient referral for urologist for eventual prostatecomy     5) dvt prop --> ambulation     6) diet --> regular

## 2018-12-31 NOTE — ED ADULT NURSE NOTE - NSIMPLEMENTINTERV_GEN_ALL_ED
Implemented All Universal Safety Interventions:  [place] to call system. Call bell, personal items and telephone within reach. Instruct patient to call for assistance. Room bathroom lighting operational. Non-slip footwear when patient is off stretcher. Physically safe environment: no spills, clutter or unnecessary equipment. Stretcher in lowest position, wheels locked, appropriate side rails in place.

## 2018-12-31 NOTE — ED PROVIDER NOTE - OBJECTIVE STATEMENT
58M with hx of BPH and HLD called to return to [facility] for (+) blood cx. patient here yesterday with c/o urinary retention and hesitancy, (+) subjective fevers. discharged home with po abx, took dose last night (keflex). (+) fevers overnight at home and chills. No nausea, vomiting. (+) continued urinary hesitancy.

## 2018-12-31 NOTE — PROGRESS NOTE ADULT - SUBJECTIVE AND OBJECTIVE BOX
NPP INFECTIOUS DISEASES AND INTERNAL MEDICINE OF [place] JOVANI WILKINS MD FACP   DONNA GARCIA MD  Diplomates American Board of Internal Medicine and Infecctious Diseases  [phone]  [phone] JACQUELINE [id number]      HPI:  Patient is a 58 year old male pt with hx of BPH(on flomax and finasteride) presents to ED after being called by the ed for positive blood cultures. Patient initially presented to the ed with 2 day hx of severe suprapubic pain, dysuria, frequency and dribbling and testicular fullness. Patient also was not feeling well and had decreased po intake. Patient of note has not been urinating normally for over 1 year as per daughter and he admits to purposely not drinking water to avoid urinating. Patient seen at bedside and states he still feels weak but no longer has testicular pain. pt with fever 101  URINE CX GM NEG RODS  BLOOD CX GM NEG RODS  ASKED TO EVALUATE FROM ID STANDPOINT     patient receive 1 dose of iv ceft in er and re- cultured (31 Dec 2018 14:24)      PAST MEDICAL & SURGICAL HISTORY:  HLD (hyperlipidemia)  Enlarged prostate  No significant past surgical history      ANTIBIOTICS  meropenem  IVPB          Allergies    aspirin (Rash)    Intolerances        SOCIAL HISTORY:       FAMILY HX   FAMILY HISTORY:  Family history of hypertension in father (Father)      Vital Signs Last 24 Hrs  T(C): 38.5 (31 Dec 2018 15:56), Max: 38.5 (31 Dec 2018 15:56)  T(F): 101.3 (31 Dec 2018 15:56), Max: 101.3 (31 Dec 2018 15:56)  HR: 90 (31 Dec 2018 15:56) (86 - 126)  BP: 112/58 (31 Dec 2018 15:56) (112/58 - 125/71)  BP(mean): --  RR: 20 (31 Dec 2018 15:56) (18 - 24)  SpO2: 100% (31 Dec 2018 15:56) (100% - 100%)  Drug Dosing Weight  Height (cm): 165.1 (31 Dec 2018 11:34)  Weight (kg): 70.3 (31 Dec 2018 11:34)  BMI (kg/m2): 25.8 (31 Dec 2018 11:34)  BSA (m2): 1.77 (31 Dec 2018 11:34)      REVIEW OF SYSTEMS:    CONSTITUTIONAL:  As per HPI.    HEENT:  Eyes:  No diplopia or blurred vision. ENT:  No earache, sore throat or runny nose.    CARDIOVASCULAR:  No pressure, squeezing, strangling, tightness, heaviness or aching about the chest, neck, axilla or epigastrium.    RESPIRATORY:  No cough, shortness of breath, PND or orthopnea.    GASTROINTESTINAL:  No nausea, vomiting or diarrhea.    GENITOURINARY:  No dysuria, frequency or urgency.    MUSCULOSKELETAL:  As per HPI.    SKIN:  No change in skin, hair or nails.    NEUROLOGIC:  No paresthesias, fasciculations, seizures or weakness.                  PHYSICAL EXAMINATION:    GENERAL: The patient is a well-developed, well-nourished _____in no apparent distress. ___ is alert and oriented x3.    VITAL SIGNS: T(C): 38.5 (18 @ 15:56), Max: 38.5 (18 @ 15:56)  HR: 90 (18 @ 15:56) (86 - 126)  BP: 112/58 (18 @ 15:56) (112/58 - 125/71)  RR: 20 (18 @ 15:56) (18 - 24)  SpO2: 100% (18 @ 15:56) (100% - 100%)  Wt(kg): --    HEENT: Head is normocephalic and atraumatic.  ANICTERIC  NECK: Supple. No carotid bruits.  No lymphadenopathy or thyromegaly.    LUNGS:COARSE BREATH SOUNDS    HEART: Regular rate and rhythm without murmur.    ABDOMEN: Soft, nontender, and nondistended.  Positive bowel sounds.  No hepatosplenomegaly was noted. NO REBOUND NO GUARDING    EXTREMITIES: NO EDEMA NO ERYTHEMA    NEUROLOGIC: NON FOCAL      SKIN: No ulceration or induration present. NO RASH        BLOOD CULTURES  Culture Results:   Growth in aerobic bottle: Gram Negative Rods  Aerobic Bottle: 11:18 Hours to positivity  Anaerobic Bottle: No growth to date  .  ***Blood Panel PCR results on this specimen are available  approximately 3 hours after the Gram stain result.***  Gram stain, PCR, and/or culture results may not always  correspond due to difference in methodologies.  ************************************************************  This PCR assay was performed using [employer].  The following targets are tested for: Enterococcus,  vancomycin resistant enterococci, Listeria monocytogenes,  coagulase negative staphylococci, S. aureus,  methicillin resistant S. aureus, Streptococcus agalactiae  (Group B), S. pneumoniae, S. pyogenes (Group A),  Acinetobacter baumannii, Enterobacter cloacae, E. coli,  Klebsiella oxytoca, K. pneumoniae, Proteus sp.,  Serratia marcescens, Haemophilus influenzae,  Neisseria meningitidis, Pseudomonas aeruginosa, Candida  albicans, C. glabrata, C krusei, C parapsilosis,  C. tropicalis and the KPC resistance gene.  "Due to technical problems, Proteus sp. will Not be reported as part of  the BCID panel until further notice"  .  TYPE: (C=Critical, N=Notification, A=Abnormal) C  TESTS:  _ Positive Blood GS  DATE/TIME CALLED: _ 2018 10:20  CALLED TO: _ ER: Cynthia DICKEY  READ BACK (2 Patient Identifiers)(Y/N): _ Y  READ BACK VALUES (Y/N): _ Y  CALLED BY: Candelario edmonds ( @ 10:19)  Culture Results:   >100,000 CFU/ml Gram Negative Rods Identification and susceptibility to  follow.  Culture in progress ( @ 08:04)       URINE CX          LABS:                        13.1   22.5  )-----------( 192      ( 31 Dec 2018 12:44 )             39.6         134<L>  |  100  |  15.0  ----------------------------<  102  4.4   |  24.0  |  0.91    Ca    8.9      31 Dec 2018 12:44    TPro  7.8  /  Alb  4.0  /  TBili  0.6  /  DBili  x   /  AST  18  /  ALT  19  /  AlkPhos  97      PT/INR - ( 31 Dec 2018 12:44 )   PT: 16.3 sec;   INR: 1.40 ratio         PTT - ( 31 Dec 2018 12:44 )  PTT:30.0 sec  Urinalysis Basic - ( 30 Dec 2018 08:04 )    Color: Yellow / Appearance: Clear / S.020 / pH: x  Gluc: x / Ketone: Negative  / Bili: Negative / Urobili: Negative mg/dL   Blood: x / Protein: 15 mg/dL / Nitrite: Negative   Leuk Esterase: Moderate / RBC: 3-5 /HPF / WBC 11-25   Sq Epi: x / Non Sq Epi: Occasional / Bacteria: Occasional        RADIOLOGY & ADDITIONAL STUDIES:      ASSESSMENT/PLAN  Patient is a 58 year old male pt with hx of BPH(on flomax and finasteride) presents to ED after being called by the ed for positive blood cultures. Patient initially presented to the ed with 2 day hx of severe suprapubic pain, dysuria, frequency and dribbling and testicular fullness. Patient also was not feeling well and had decreased po intake. Patient of note has not been urinating normally for over 1 year as per daughter and he admits to purposely not drinking water to avoid urinating. Patient seen at bedside and states he still feels weak but no longer has testicular pain. pt with fever 101  URINE CX GM NEG RODS  BLOOD CX GM NEG RODS  PRELIM IDENTIFIED AS ECOLI  WOULD RX MERREM AS CONCERN FOR POSSIBLE ESBL OR MDR ORGANISM  WILL TAILOR DOWN ABX ONCE SENSITIVITIES KNOWN  EXPLAINED TO PT THAT EVEN WITH ABX FEVER MAY PERSIST FOR A FEW DAYS  WILL FOLLOW UP D/W FAMILY  AT BEDSIDE                   DONNA ELMORE MD NPP INFECTIOUS DISEASES AND INTERNAL MEDICINE OF [place] JOVANI WILKINS MD FACP   DONNA GARCIA MD  Diplomates American Board of Internal Medicine and Infecctious Diseases  [phone]  [phone] JACQUELINE [id number]  	                        INFECTIOUS DISEASE CONSULTATION    HPI:  Patient is a 58 year old male pt with hx of BPH(on flomax and finasteride) presents to ED after being called by the ed for positive blood cultures. Patient initially presented to the ed with 2 day hx of severe suprapubic pain, dysuria, frequency and dribbling and testicular fullness. Patient also was not feeling well and had decreased po intake. Patient of note has not been urinating normally for over 1 year as per daughter and he admits to purposely not drinking water to avoid urinating. Patient seen at bedside and states he still feels weak but no longer has testicular pain. pt with fever 101  URINE CX GM NEG RODS  BLOOD CX GM NEG RODS  ASKED TO EVALUATE FROM ID STANDPOINT     patient receive 1 dose of iv ceft in er and re- cultured (31 Dec 2018 14:24)      PAST MEDICAL & SURGICAL HISTORY:  HLD (hyperlipidemia)  Enlarged prostate  No significant past surgical history      ANTIBIOTICS  meropenem  IVPB          Allergies    aspirin (Rash)    Intolerances        SOCIAL HISTORY:       FAMILY HX   FAMILY HISTORY:  Family history of hypertension in father (Father)      Vital Signs Last 24 Hrs  T(C): 38.5 (31 Dec 2018 15:56), Max: 38.5 (31 Dec 2018 15:56)  T(F): 101.3 (31 Dec 2018 15:56), Max: 101.3 (31 Dec 2018 15:56)  HR: 90 (31 Dec 2018 15:56) (86 - 126)  BP: 112/58 (31 Dec 2018 15:56) (112/58 - 125/71)  BP(mean): --  RR: 20 (31 Dec 2018 15:56) (18 - 24)  SpO2: 100% (31 Dec 2018 15:56) (100% - 100%)  Drug Dosing Weight  Height (cm): 165.1 (31 Dec 2018 11:34)  Weight (kg): 70.3 (31 Dec 2018 11:34)  BMI (kg/m2): 25.8 (31 Dec 2018 11:34)  BSA (m2): 1.77 (31 Dec 2018 11:34)      REVIEW OF SYSTEMS:    CONSTITUTIONAL:  As per HPI.    HEENT:  Eyes:  No diplopia or blurred vision. ENT:  No earache, sore throat or runny nose.    CARDIOVASCULAR:  No pressure, squeezing, strangling, tightness, heaviness or aching about the chest, neck, axilla or epigastrium.    RESPIRATORY:  No cough, shortness of breath, PND or orthopnea.    GASTROINTESTINAL:  No nausea, vomiting or diarrhea.    GENITOURINARY:  No dysuria, frequency or urgency.    MUSCULOSKELETAL:  As per HPI.    SKIN:  No change in skin, hair or nails.    NEUROLOGIC:  No paresthesias, fasciculations, seizures or weakness.                  PHYSICAL EXAMINATION:    GENERAL: The patient is a well-developed, well-nourished _____in no apparent distress. ___ is alert and oriented x3.    VITAL SIGNS: T(C): 38.5 (18 @ 15:56), Max: 38.5 (18 @ 15:56)  HR: 90 (18 @ 15:56) (86 - 126)  BP: 112/58 (18 @ 15:56) (112/58 - 125/71)  RR: 20 (18 @ 15:56) (18 - 24)  SpO2: 100% (18 @ 15:56) (100% - 100%)  Wt(kg): --    HEENT: Head is normocephalic and atraumatic.  ANICTERIC  NECK: Supple. No carotid bruits.  No lymphadenopathy or thyromegaly.    LUNGS:COARSE BREATH SOUNDS    HEART: Regular rate and rhythm without murmur.    ABDOMEN: Soft, nontender, and nondistended.  Positive bowel sounds.  No hepatosplenomegaly was noted. NO REBOUND NO GUARDING    EXTREMITIES: NO EDEMA NO ERYTHEMA    NEUROLOGIC: NON FOCAL      SKIN: No ulceration or induration present. NO RASH        BLOOD CULTURES  Culture Results:   Growth in aerobic bottle: Gram Negative Rods  Aerobic Bottle: 11:18 Hours to positivity  Anaerobic Bottle: No growth to date  .  ***Blood Panel PCR results on this specimen are available  approximately 3 hours after the Gram stain result.***  Gram stain, PCR, and/or culture results may not always  correspond due to difference in methodologies.  ************************************************************  This PCR assay was performed using [employer].  The following targets are tested for: Enterococcus,  vancomycin resistant enterococci, Listeria monocytogenes,  coagulase negative staphylococci, S. aureus,  methicillin resistant S. aureus, Streptococcus agalactiae  (Group B), S. pneumoniae, S. pyogenes (Group A),  Acinetobacter baumannii, Enterobacter cloacae, E. coli,  Klebsiella oxytoca, K. pneumoniae, Proteus sp.,  Serratia marcescens, Haemophilus influenzae,  Neisseria meningitidis, Pseudomonas aeruginosa, Candida  albicans, C. glabrata, C krusei, C parapsilosis,  C. tropicalis and the KPC resistance gene.  "Due to technical problems, Proteus sp. will Not be reported as part of  the BCID panel until further notice"  .  TYPE: (C=Critical, N=Notification, A=Abnormal) C  TESTS:  _ Positive Blood GS  DATE/TIME CALLED: _ 2018 10:20  CALLED TO: _ ER: Cynthia DICKEY  READ BACK (2 Patient Identifiers)(Y/N): _ Y  READ BACK VALUES (Y/N): _ Y  CALLED BY: Candelario edmonds ( @ 10:19)  Culture Results:   >100,000 CFU/ml Gram Negative Rods Identification and susceptibility to  follow.  Culture in progress ( @ 08:04)       URINE CX          LABS:                        13.1   22.5  )-----------( 192      ( 31 Dec 2018 12:44 )             39.6         134<L>  |  100  |  15.0  ----------------------------<  102  4.4   |  24.0  |  0.91    Ca    8.9      31 Dec 2018 12:44    TPro  7.8  /  Alb  4.0  /  TBili  0.6  /  DBili  x   /  AST  18  /  ALT  19  /  AlkPhos  97      PT/INR - ( 31 Dec 2018 12:44 )   PT: 16.3 sec;   INR: 1.40 ratio         PTT - ( 31 Dec 2018 12:44 )  PTT:30.0 sec  Urinalysis Basic - ( 30 Dec 2018 08:04 )    Color: Yellow / Appearance: Clear / S.020 / pH: x  Gluc: x / Ketone: Negative  / Bili: Negative / Urobili: Negative mg/dL   Blood: x / Protein: 15 mg/dL / Nitrite: Negative   Leuk Esterase: Moderate / RBC: 3-5 /HPF / WBC 11-25   Sq Epi: x / Non Sq Epi: Occasional / Bacteria: Occasional        RADIOLOGY & ADDITIONAL STUDIES:      ASSESSMENT/PLAN  Patient is a 58 year old male pt with hx of BPH(on flomax and finasteride) presents to ED after being called by the ed for positive blood cultures. Patient initially presented to the ed with 2 day hx of severe suprapubic pain, dysuria, frequency and dribbling and testicular fullness. Patient also was not feeling well and had decreased po intake. Patient of note has not been urinating normally for over 1 year as per daughter and he admits to purposely not drinking water to avoid urinating. Patient seen at bedside and states he still feels weak but no longer has testicular pain. pt with fever 101  URINE CX GM NEG RODS  BLOOD CX GM NEG RODS  PRELIM IDENTIFIED AS ECOLI  WOULD RX MERREM AS CONCERN FOR POSSIBLE ESBL OR MDR ORGANISM  WILL TAILOR DOWN ABX ONCE SENSITIVITIES KNOWN  EXPLAINED TO PT THAT EVEN WITH ABX FEVER MAY PERSIST FOR A FEW DAYS  WILL FOLLOW UP D/W FAMILY  AT BEDSIDE                   DONNA ELMORE MD

## 2018-12-31 NOTE — ED STATDOCS - PROGRESS NOTE DETAILS
Ryan Khan for Dr Antonette Fajardo: Pt is a 57 y/o M, with PMHx of BPH, presenting to the ED with abnormal lab results. Hx obtained by sn at bedside. Pt was seen at [facility] ED yesterday for evaluation of dysuria, urianry urgency, dribbling urine stream, and lower abd pain. Had a work up and dx'd with acute prostatitis, and dc'd with PO abx. Was called today with positive blood cultures. Pt states he is still having significant pain with urination, abd pain, and chills. No reported fevers. Denies diarrhea, and URI sxs. No sick contacts or travel. Took his first dose of po abx last night. Last dose antipyretic at 0800 this AM. will send to main.

## 2018-12-31 NOTE — ED STATDOCS - NS ED ROS FT
ROS: + chills, no CP/SOB. no cough. no fever. no n/v/d/c. +abd pain. no rash. no bleeding. + urinary complaints. no weakness. no vision changes. no HA. no neck/back pain. no extremity swelling/deformity. No change in mental status.

## 2018-12-31 NOTE — H&P ADULT - NSHPREVIEWOFSYSTEMS_GEN_ALL_CORE
REVIEW OF SYSTEMS:    CONSTITUTIONAL: fever and fatigue  EYES: No eye pain, visual disturbances, or discharge  ENMT:  No difficulty hearing, tinnitus, vertigo; No sinus or throat pain  NECK: No pain or stiffness  BREASTS: No pain, masses, or nipple discharge  RESPIRATORY: No cough, wheezing, chills or hemoptysis; No shortness of breath  CARDIOVASCULAR: No chest pain, palpitations, dizziness, or leg swelling  GASTROINTESTINAL: No abdominal or epigastric pain. No nausea, vomiting, or hematemesis; No diarrhea or constipation. No melena or hematochezia.  GENITOURINARY: hesitancy, pain on urination   NEUROLOGICAL: No headaches, memory loss, loss of strength, numbness, or tremors  SKIN: No itching, burning, rashes, or lesions   LYMPH NODES: No enlarged glands  ENDOCRINE: No heat or cold intolerance; No hair loss  MUSCULOSKELETAL: No joint pain or swelling; No muscle, back, or extremity pain  PSYCHIATRIC: No depression, anxiety, mood swings, or difficulty sleeping  HEME/LYMPH: No easy bruising, or bleeding gums  ALLERY AND IMMUNOLOGIC: No hives or eczema

## 2018-12-31 NOTE — ED ADULT NURSE NOTE - OBJECTIVE STATEMENT
Pt care assumed at 1235, presents to ED A&Ox3 c/o urinary urgency with associated subjective chills and fever. Pt was seen in the ED yesterday for same symptoms and called back to ED today for + Blood cultures. Pt denies any pain or discomfort. Pt was activated as code sepsis upon repeating V/S. Pt in no apparent distress at this time, respirations even and unlabored. IV access established, labs drawn and sent, results pending. Will continue to monitor and reassess.

## 2018-12-31 NOTE — ED STATDOCS - NS_ ATTENDINGSCRIBEDETAILS _ED_A_ED_FT
I, Antonette Fajardo, performed the initial face to face bedside interview with this patient regarding history of present illness, review of symptoms and relevant past medical, social and family history.  I completed an independent physical examination.  The history, relevant review of systems, past medical and surgical history, medical decision making, and physical examination was documented by the scribe in my presence and I attest to the accuracy of the documentation.

## 2018-12-31 NOTE — ED POST DISCHARGE NOTE - DETAILS
Left a voice message for patient and his listed emergency contact. Pt contacted and told about + blood culture. Pt is on the way to ED now. +UC patient admitted to [facility]

## 2018-12-31 NOTE — ED ADULT NURSE REASSESSMENT NOTE - NS ED NURSE REASSESS COMMENT FT1
Dr. Cano at bedside for eval. Dr. Cano at bedside for eval, updated MD on pt's temp and VS. No new orders at this time. Pt reports improvement in symptoms, denies any questions or complaints at this time. Will continue to monitor and reassess. Dr. Cano at bedside for eval, updated MD on pt's temp and VS. No new orders at this time. Pt reports improvement in symptoms, denies any questions or complaints at this time. MD Cabrera paged to notify of pt's temp. MD Cano to order additional abx. Will continue to monitor and reassess.

## 2018-12-31 NOTE — H&P ADULT - NSHPLABSRESULTS_GEN_ALL_CORE
13.1   22.5   )----------(  192       ( 31 Dec 2018 12:44 )               39.6      134    |  100    |  15.0   ----------------------------<  102        ( 31 Dec 2018 12:44 )  4.4     |  24.0   |  0.91     Ca    8.9        ( 31 Dec 2018 12:44 )    TPro  7.8    /  Alb  4.0    /  TBili  0.6    /  DBili  x      /  AST  18     /  ALT  19     /  AlkPhos  97     ( 31 Dec 2018 12:44 )    LIVER FUNCTIONS - ( 31 Dec 2018 12:44 )  Alb: 4.0 g/dL / Pro: 7.8 g/dL / ALK PHOS: 97 U/L / ALT: 19 U/L / AST: 18 U/L / GGT: x           PT/INR -  16.3 sec / 1.40 ratio   ( 31 Dec 2018 12:44 )       PTT -  30.0 sec   ( 31 Dec 2018 12:44 )  CAPILLARY BLOOD GLUCOSE        bood culture and urine culture performed

## 2018-12-31 NOTE — H&P ADULT - NSHPPHYSICALEXAM_GEN_ALL_CORE
PHYSICAL EXAM:    GENERAL: NAD, well-groomed, warm to touch   HEAD:  Atraumatic, Normocephalic  EYES: EOMI, PERRLA, conjunctiva and sclera clear  ENMT: No tonsillar erythema, exudates, or enlargement; Moist mucous membranes, Good dentition, No lesions  NECK: Supple, No JVD, Normal thyroid  NERVOUS SYSTEM:  Alert & Oriented X3, Good concentration; Motor Strength 5/5 B/L upper and lower extremities; DTRs 2+ intact and symmetric  CHEST/LUNG: Clear to percussion bilaterally; No rales, rhonchi, wheezing, or rubs  HEART: Regular rate and rhythm; No murmurs, rubs, or gallops  ABDOMEN: Soft, Nontender, Nondistended; Bowel sounds present  EXTREMITIES:  2+ Peripheral Pulses, No clubbing, cyanosis, or edema  : no discharge, nontender  LYMPH: No lymphadenopathy noted  SKIN: No rashes or lesions

## 2018-12-31 NOTE — ED STATDOCS - PHYSICAL EXAMINATION
Gen: NAD, AOx3  Head: NCAT  HEENT: EOMI, oral mucosa moist, normal conjunctiva, neck supple  Lung: no respiratory distress  CV:  Normal perfusion  Abd: soft, NTND  MSK: No edema, no visible deformities  Neuro: No focal neurologic deficits  Skin: No rash   Psych: normal affect

## 2018-12-31 NOTE — ED PROVIDER NOTE - PROGRESS NOTE DETAILS
Case discussed Dr. Cabrera for admission. Pt. stable at this time. Addendum to the chart for code sepsis.   I re-evaluated the patient's fluid status and reviewed the vital signs. My clinical evaluation demonstrate appropriate response to resuscitation.

## 2019-01-01 LAB
-  AMIKACIN: SIGNIFICANT CHANGE UP
-  AMIKACIN: SIGNIFICANT CHANGE UP
-  AMPICILLIN/SULBACTAM: SIGNIFICANT CHANGE UP
-  AMPICILLIN/SULBACTAM: SIGNIFICANT CHANGE UP
-  AMPICILLIN: SIGNIFICANT CHANGE UP
-  AMPICILLIN: SIGNIFICANT CHANGE UP
-  AZTREONAM: SIGNIFICANT CHANGE UP
-  AZTREONAM: SIGNIFICANT CHANGE UP
-  CEFAZOLIN: SIGNIFICANT CHANGE UP
-  CEFAZOLIN: SIGNIFICANT CHANGE UP
-  CEFEPIME: SIGNIFICANT CHANGE UP
-  CEFEPIME: SIGNIFICANT CHANGE UP
-  CEFOXITIN: SIGNIFICANT CHANGE UP
-  CEFOXITIN: SIGNIFICANT CHANGE UP
-  CEFTRIAXONE: SIGNIFICANT CHANGE UP
-  CEFTRIAXONE: SIGNIFICANT CHANGE UP
-  CIPROFLOXACIN: SIGNIFICANT CHANGE UP
-  CIPROFLOXACIN: SIGNIFICANT CHANGE UP
-  ERTAPENEM: SIGNIFICANT CHANGE UP
-  ERTAPENEM: SIGNIFICANT CHANGE UP
-  GENTAMICIN: SIGNIFICANT CHANGE UP
-  GENTAMICIN: SIGNIFICANT CHANGE UP
-  IMIPENEM: SIGNIFICANT CHANGE UP
-  IMIPENEM: SIGNIFICANT CHANGE UP
-  LEVOFLOXACIN: SIGNIFICANT CHANGE UP
-  LEVOFLOXACIN: SIGNIFICANT CHANGE UP
-  MEROPENEM: SIGNIFICANT CHANGE UP
-  MEROPENEM: SIGNIFICANT CHANGE UP
-  NITROFURANTOIN: SIGNIFICANT CHANGE UP
-  PIPERACILLIN/TAZOBACTAM: SIGNIFICANT CHANGE UP
-  PIPERACILLIN/TAZOBACTAM: SIGNIFICANT CHANGE UP
-  TIGECYCLINE: SIGNIFICANT CHANGE UP
-  TOBRAMYCIN: SIGNIFICANT CHANGE UP
-  TOBRAMYCIN: SIGNIFICANT CHANGE UP
-  TRIMETHOPRIM/SULFAMETHOXAZOLE: SIGNIFICANT CHANGE UP
-  TRIMETHOPRIM/SULFAMETHOXAZOLE: SIGNIFICANT CHANGE UP
ANION GAP SERPL CALC-SCNC: 8 MMOL/L — SIGNIFICANT CHANGE UP (ref 5–17)
APPEARANCE UR: CLEAR — SIGNIFICANT CHANGE UP
BILIRUB UR-MCNC: NEGATIVE — SIGNIFICANT CHANGE UP
BUN SERPL-MCNC: 10 MG/DL — SIGNIFICANT CHANGE UP (ref 8–20)
CALCIUM SERPL-MCNC: 8 MG/DL — LOW (ref 8.6–10.2)
CHLORIDE SERPL-SCNC: 103 MMOL/L — SIGNIFICANT CHANGE UP (ref 98–107)
CO2 SERPL-SCNC: 20 MMOL/L — LOW (ref 22–29)
COLOR SPEC: YELLOW — SIGNIFICANT CHANGE UP
CREAT SERPL-MCNC: 0.57 MG/DL — SIGNIFICANT CHANGE UP (ref 0.5–1.3)
CULTURE RESULTS: SIGNIFICANT CHANGE UP
DIFF PNL FLD: NEGATIVE — SIGNIFICANT CHANGE UP
EPI CELLS # UR: SIGNIFICANT CHANGE UP
GLUCOSE SERPL-MCNC: 117 MG/DL — HIGH (ref 70–115)
GLUCOSE UR QL: NEGATIVE MG/DL — SIGNIFICANT CHANGE UP
HCT VFR BLD CALC: 33.8 % — LOW (ref 42–52)
HGB BLD-MCNC: 11 G/DL — LOW (ref 14–18)
KETONES UR-MCNC: NEGATIVE — SIGNIFICANT CHANGE UP
LEUKOCYTE ESTERASE UR-ACNC: NEGATIVE — SIGNIFICANT CHANGE UP
MAGNESIUM SERPL-MCNC: 2 MG/DL — SIGNIFICANT CHANGE UP (ref 1.6–2.6)
MCHC RBC-ENTMCNC: 28.9 PG — SIGNIFICANT CHANGE UP (ref 27–31)
MCHC RBC-ENTMCNC: 32.5 G/DL — SIGNIFICANT CHANGE UP (ref 32–36)
MCV RBC AUTO: 88.7 FL — SIGNIFICANT CHANGE UP (ref 80–94)
METHOD TYPE: SIGNIFICANT CHANGE UP
METHOD TYPE: SIGNIFICANT CHANGE UP
NITRITE UR-MCNC: NEGATIVE — SIGNIFICANT CHANGE UP
ORGANISM # SPEC MICROSCOPIC CNT: SIGNIFICANT CHANGE UP
ORGANISM # SPEC MICROSCOPIC CNT: SIGNIFICANT CHANGE UP
PH UR: 8 — SIGNIFICANT CHANGE UP (ref 5–8)
PLATELET # BLD AUTO: 164 K/UL — SIGNIFICANT CHANGE UP (ref 150–400)
POTASSIUM SERPL-MCNC: 4.3 MMOL/L — SIGNIFICANT CHANGE UP (ref 3.5–5.3)
POTASSIUM SERPL-SCNC: 4.3 MMOL/L — SIGNIFICANT CHANGE UP (ref 3.5–5.3)
PROCALCITONIN SERPL-MCNC: 0.6 NG/ML — HIGH (ref 0–0.04)
PROT UR-MCNC: 30 MG/DL
RBC # BLD: 3.81 M/UL — LOW (ref 4.6–6.2)
RBC # FLD: 14.7 % — SIGNIFICANT CHANGE UP (ref 11–15.6)
RBC CASTS # UR COMP ASSIST: SIGNIFICANT CHANGE UP /HPF (ref 0–4)
SODIUM SERPL-SCNC: 131 MMOL/L — LOW (ref 135–145)
SP GR SPEC: 1.01 — SIGNIFICANT CHANGE UP (ref 1.01–1.02)
SPECIMEN SOURCE: SIGNIFICANT CHANGE UP
UROBILINOGEN FLD QL: 1 MG/DL
WBC # BLD: 15 K/UL — HIGH (ref 4.8–10.8)
WBC # FLD AUTO: 15 K/UL — HIGH (ref 4.8–10.8)
WBC UR QL: SIGNIFICANT CHANGE UP

## 2019-01-01 PROCEDURE — 99233 SBSQ HOSP IP/OBS HIGH 50: CPT

## 2019-01-01 RX ORDER — LANOLIN ALCOHOL/MO/W.PET/CERES
3 CREAM (GRAM) TOPICAL ONCE
Qty: 0 | Refills: 0 | Status: COMPLETED | OUTPATIENT
Start: 2019-01-01 | End: 2019-01-01

## 2019-01-01 RX ADMIN — OXYCODONE AND ACETAMINOPHEN 1 TABLET(S): 5; 325 TABLET ORAL at 17:26

## 2019-01-01 RX ADMIN — FINASTERIDE 5 MILLIGRAM(S): 5 TABLET, FILM COATED ORAL at 11:27

## 2019-01-01 RX ADMIN — Medication 3 MILLIGRAM(S): at 23:39

## 2019-01-01 RX ADMIN — MEROPENEM 100 MILLIGRAM(S): 1 INJECTION INTRAVENOUS at 02:15

## 2019-01-01 RX ADMIN — TAMSULOSIN HYDROCHLORIDE 0.8 MILLIGRAM(S): 0.4 CAPSULE ORAL at 21:13

## 2019-01-01 RX ADMIN — SODIUM CHLORIDE 125 MILLILITER(S): 9 INJECTION INTRAMUSCULAR; INTRAVENOUS; SUBCUTANEOUS at 02:22

## 2019-01-01 RX ADMIN — ONDANSETRON 4 MILLIGRAM(S): 8 TABLET, FILM COATED ORAL at 07:49

## 2019-01-01 RX ADMIN — Medication 650 MILLIGRAM(S): at 03:15

## 2019-01-01 RX ADMIN — OXYCODONE AND ACETAMINOPHEN 2 TABLET(S): 5; 325 TABLET ORAL at 02:15

## 2019-01-01 RX ADMIN — Medication 250 MILLIGRAM(S): at 17:16

## 2019-01-01 RX ADMIN — MEROPENEM 100 MILLIGRAM(S): 1 INJECTION INTRAVENOUS at 17:16

## 2019-01-01 RX ADMIN — OXYCODONE AND ACETAMINOPHEN 1 TABLET(S): 5; 325 TABLET ORAL at 22:00

## 2019-01-01 RX ADMIN — MEROPENEM 100 MILLIGRAM(S): 1 INJECTION INTRAVENOUS at 10:03

## 2019-01-01 RX ADMIN — OXYCODONE AND ACETAMINOPHEN 1 TABLET(S): 5; 325 TABLET ORAL at 21:13

## 2019-01-01 RX ADMIN — Medication 650 MILLIGRAM(S): at 02:44

## 2019-01-01 RX ADMIN — OXYCODONE AND ACETAMINOPHEN 2 TABLET(S): 5; 325 TABLET ORAL at 03:15

## 2019-01-01 RX ADMIN — Medication 250 MILLIGRAM(S): at 05:59

## 2019-01-01 RX ADMIN — SODIUM CHLORIDE 125 MILLILITER(S): 9 INJECTION INTRAMUSCULAR; INTRAVENOUS; SUBCUTANEOUS at 21:13

## 2019-01-01 NOTE — PROGRESS NOTE ADULT - ASSESSMENT
1) Sepsis secondary to gram neg uti --> id following  --> agree with iv meropenem  --> follow up repeat urine culture and repeat blood culture  --> follow up hepatitis panel    2) severe BPH --> flomax increased to 0.8mg  --> c.w finasteride  --> likely will need dc with francisco  --> daughter requesting a new urologist for outpatient follow up   --> will need eventual prostatectomy     3) dvt prop --> ambulation  --> low risk     4) pain --> percocet prn    5) diet --> regular diet 1) Sepsis secondary to gram neg uti --> id following  --> agree with iv meropenem  --> follow up repeat urine culture and repeat blood culture  --> follow up hepatitis panel    2) severe BPH --> flomax increased to 0.8mg  --> c.w finasteride  --> likely will need dc with francisco  --> daughter requesting a new urologist for outpatient follow up  (please give appt for outpatient follow up next week with [facility] urologist)  --> will need eventual prostatectomy     3) dvt prop --> ambulation  --> low risk     4) pain --> percocet prn    5) diet --> regular diet

## 2019-01-01 NOTE — PROGRESS NOTE ADULT - SUBJECTIVE AND OBJECTIVE BOX
ANIKET JOSÉ    [id number]    58y      Male    INTERVAL HPI/OVERNIGHT EVENTS:    patient being seen for gram neg bacteremia and gram neg uti and severe bph. Patient had francisco placed overnight after intiially refusing francisco.         REVIEW OF SYSTEMS:    CONSTITUTIONAL: pain   RESPIRATORY: No cough, wheezing, hemoptysis; No shortness of breath  CARDIOVASCULAR: No chest pain, palpitations  GASTROINTESTINAL: No abdominal or epigastric pain. No nausea, vomiting  NEUROLOGICAL: No headaches, memory loss, loss of strength.  MISCELLANEOUS:      Vital Signs Last 24 Hrs  T(C): 37.3 (2019 04:53), Max: 38.8 (2019 02:38)  T(F): 99.1 (2019 04:53), Max: 101.9 (2019 02:38)  HR: 91 (2019 02:38) (85 - 126)  BP: 145/81 (2019 02:38) (112/58 - 155/80)  BP(mean): --  RR: 19 (2019 02:38) (18 - 24)  SpO2: 96% (2019 02:38) (96% - 100%)    PHYSICAL EXAM:      	GENERAL: NAD, well-groomed,   	HEAD:  Atraumatic, Normocephalic  	EYES: EOMI, PERRLA, conjunctiva and sclera clear  	ENMT: No tonsillar erythema, exudates, or enlargement; Moist mucous membranes, Good dentition, No lesions  	NECK: Supple, No JVD, Normal thyroid  	NERVOUS SYSTEM:  Alert & Oriented X3, Good concentration;  	CHEST/LUNG: Clear to percussion bilaterally; No rales, rhonchi, wheezing, or rubs  	HEART: Regular rate and rhythm; No murmurs, rubs, or gallops  	ABDOMEN: Soft, Nontender, Nondistended; Bowel sounds present  	EXTREMITIES:  2+ Peripheral Pulses, No clubbing, cyanosis, or edema  	: no discharge, nontender  	LYMPH: No lymphadenopathy noted  SKIN: No rashes or lesions    LABS:                        13.1   22.5  )-----------( 192      ( 31 Dec 2018 12:44 )             39.6     12-    134<L>  |  100  |  15.0  ----------------------------<  102  4.4   |  24.0  |  0.91    Ca    8.9      31 Dec 2018 12:44    TPro  7.8  /  Alb  4.0  /  TBili  0.6  /  DBili  x   /  AST  18  /  ALT  19  /  AlkPhos  97  12-31    PT/INR - ( 31 Dec 2018 12:44 )   PT: 16.3 sec;   INR: 1.40 ratio         PTT - ( 31 Dec 2018 12:44 )  PTT:30.0 sec  Urinalysis Basic - ( 2019 01:48 )    Color: Yellow / Appearance: Clear / S.010 / pH: x  Gluc: x / Ketone: Negative  / Bili: Negative / Urobili: 1 mg/dL   Blood: x / Protein: 30 mg/dL / Nitrite: Negative   Leuk Esterase: Negative / RBC: 0-2 /HPF / WBC 0-2   Sq Epi: x / Non Sq Epi: Occasional / Bacteria: x          MEDICATIONS  (STANDING):  finasteride 5 milliGRAM(s) Oral daily  meropenem  IVPB 500 milliGRAM(s) IV Intermittent every 8 hours  meropenem  IVPB      saccharomyces boulardii 250 milliGRAM(s) Oral two times a day  sodium chloride 0.9%. 1000 milliLiter(s) (125 mL/Hr) IV Continuous <Continuous>  tamsulosin 0.8 milliGRAM(s) Oral at bedtime    MEDICATIONS  (PRN):  acetaminophen   Tablet .. 650 milliGRAM(s) Oral every 6 hours PRN Temp greater or equal to 38C (100.4F), Mild Pain (1 - 3)  ondansetron Injectable 4 milliGRAM(s) IV Push every 6 hours PRN Nausea and/or Vomiting  oxyCODONE    5 mG/acetaminophen 325 mG 1 Tablet(s) Oral every 4 hours PRN Moderate Pain (4 - 6)  oxyCODONE    5 mG/acetaminophen 325 mG 2 Tablet(s) Oral every 6 hours PRN Severe Pain (7 - 10)      RADIOLOGY & ADDITIONAL TESTS:

## 2019-01-02 DIAGNOSIS — A41.51 SEPSIS DUE TO ESCHERICHIA COLI [E. COLI]: ICD-10-CM

## 2019-01-02 DIAGNOSIS — N40.1 BENIGN PROSTATIC HYPERPLASIA WITH LOWER URINARY TRACT SYMPTOMS: ICD-10-CM

## 2019-01-02 DIAGNOSIS — N40.0 BENIGN PROSTATIC HYPERPLASIA WITHOUT LOWER URINARY TRACT SYMPTOMS: ICD-10-CM

## 2019-01-02 DIAGNOSIS — N41.9 INFLAMMATORY DISEASE OF PROSTATE, UNSPECIFIED: ICD-10-CM

## 2019-01-02 DIAGNOSIS — E78.2 MIXED HYPERLIPIDEMIA: ICD-10-CM

## 2019-01-02 DIAGNOSIS — A49.9 BACTERIAL INFECTION, UNSPECIFIED: ICD-10-CM

## 2019-01-02 LAB
ALBUMIN SERPL ELPH-MCNC: 2.9 G/DL — LOW (ref 3.3–5.2)
ALP SERPL-CCNC: 77 U/L — SIGNIFICANT CHANGE UP (ref 40–120)
ALT FLD-CCNC: 16 U/L — SIGNIFICANT CHANGE UP
ANION GAP SERPL CALC-SCNC: 10 MMOL/L — SIGNIFICANT CHANGE UP (ref 5–17)
AST SERPL-CCNC: 10 U/L — SIGNIFICANT CHANGE UP
BILIRUB SERPL-MCNC: <0.2 MG/DL — LOW (ref 0.4–2)
BUN SERPL-MCNC: 10 MG/DL — SIGNIFICANT CHANGE UP (ref 8–20)
CALCIUM SERPL-MCNC: 8.2 MG/DL — LOW (ref 8.6–10.2)
CHLORIDE SERPL-SCNC: 102 MMOL/L — SIGNIFICANT CHANGE UP (ref 98–107)
CO2 SERPL-SCNC: 24 MMOL/L — SIGNIFICANT CHANGE UP (ref 22–29)
CREAT SERPL-MCNC: 0.63 MG/DL — SIGNIFICANT CHANGE UP (ref 0.5–1.3)
CULTURE RESULTS: NO GROWTH — SIGNIFICANT CHANGE UP
GLUCOSE SERPL-MCNC: 108 MG/DL — SIGNIFICANT CHANGE UP (ref 70–115)
HAV IGM SER-ACNC: SIGNIFICANT CHANGE UP
HBV CORE IGM SER-ACNC: SIGNIFICANT CHANGE UP
HBV SURFACE AG SER-ACNC: SIGNIFICANT CHANGE UP
HCT VFR BLD CALC: 33.7 % — LOW (ref 42–52)
HCV AB S/CO SERPL IA: 0.07 S/CO — SIGNIFICANT CHANGE UP
HCV AB SERPL-IMP: SIGNIFICANT CHANGE UP
HGB BLD-MCNC: 10.9 G/DL — LOW (ref 14–18)
MAGNESIUM SERPL-MCNC: 2.1 MG/DL — SIGNIFICANT CHANGE UP (ref 1.6–2.6)
MCHC RBC-ENTMCNC: 28.6 PG — SIGNIFICANT CHANGE UP (ref 27–31)
MCHC RBC-ENTMCNC: 32.3 G/DL — SIGNIFICANT CHANGE UP (ref 32–36)
MCV RBC AUTO: 88.5 FL — SIGNIFICANT CHANGE UP (ref 80–94)
PLATELET # BLD AUTO: 197 K/UL — SIGNIFICANT CHANGE UP (ref 150–400)
POTASSIUM SERPL-MCNC: 4.2 MMOL/L — SIGNIFICANT CHANGE UP (ref 3.5–5.3)
POTASSIUM SERPL-SCNC: 4.2 MMOL/L — SIGNIFICANT CHANGE UP (ref 3.5–5.3)
PROT SERPL-MCNC: 6.1 G/DL — LOW (ref 6.6–8.7)
RBC # BLD: 3.81 M/UL — LOW (ref 4.6–6.2)
RBC # FLD: 14.6 % — SIGNIFICANT CHANGE UP (ref 11–15.6)
SODIUM SERPL-SCNC: 136 MMOL/L — SIGNIFICANT CHANGE UP (ref 135–145)
SPECIMEN SOURCE: SIGNIFICANT CHANGE UP
WBC # BLD: 9.2 K/UL — SIGNIFICANT CHANGE UP (ref 4.8–10.8)
WBC # FLD AUTO: 9.2 K/UL — SIGNIFICANT CHANGE UP (ref 4.8–10.8)

## 2019-01-02 PROCEDURE — 99232 SBSQ HOSP IP/OBS MODERATE 35: CPT

## 2019-01-02 PROCEDURE — 99222 1ST HOSP IP/OBS MODERATE 55: CPT

## 2019-01-02 PROCEDURE — 99233 SBSQ HOSP IP/OBS HIGH 50: CPT

## 2019-01-02 RX ORDER — ERTAPENEM SODIUM 1 G/1
1000 INJECTION, POWDER, LYOPHILIZED, FOR SOLUTION INTRAMUSCULAR; INTRAVENOUS EVERY 24 HOURS
Qty: 0 | Refills: 0 | Status: DISCONTINUED | OUTPATIENT
Start: 2019-01-02 | End: 2019-01-04

## 2019-01-02 RX ORDER — LANOLIN ALCOHOL/MO/W.PET/CERES
3 CREAM (GRAM) TOPICAL AT BEDTIME
Qty: 0 | Refills: 0 | Status: DISCONTINUED | OUTPATIENT
Start: 2019-01-02 | End: 2019-01-04

## 2019-01-02 RX ORDER — DOCUSATE SODIUM 100 MG
100 CAPSULE ORAL THREE TIMES A DAY
Qty: 0 | Refills: 0 | Status: DISCONTINUED | OUTPATIENT
Start: 2019-01-02 | End: 2019-01-04

## 2019-01-02 RX ORDER — POLYETHYLENE GLYCOL 3350 17 G/17G
17 POWDER, FOR SOLUTION ORAL DAILY
Qty: 0 | Refills: 0 | Status: DISCONTINUED | OUTPATIENT
Start: 2019-01-02 | End: 2019-01-04

## 2019-01-02 RX ORDER — SENNA PLUS 8.6 MG/1
2 TABLET ORAL AT BEDTIME
Qty: 0 | Refills: 0 | Status: DISCONTINUED | OUTPATIENT
Start: 2019-01-02 | End: 2019-01-04

## 2019-01-02 RX ADMIN — Medication 250 MILLIGRAM(S): at 16:12

## 2019-01-02 RX ADMIN — SODIUM CHLORIDE 125 MILLILITER(S): 9 INJECTION INTRAMUSCULAR; INTRAVENOUS; SUBCUTANEOUS at 21:07

## 2019-01-02 RX ADMIN — MEROPENEM 100 MILLIGRAM(S): 1 INJECTION INTRAVENOUS at 01:28

## 2019-01-02 RX ADMIN — Medication 3 MILLIGRAM(S): at 21:08

## 2019-01-02 RX ADMIN — OXYCODONE AND ACETAMINOPHEN 1 TABLET(S): 5; 325 TABLET ORAL at 04:29

## 2019-01-02 RX ADMIN — ERTAPENEM SODIUM 120 MILLIGRAM(S): 1 INJECTION, POWDER, LYOPHILIZED, FOR SOLUTION INTRAMUSCULAR; INTRAVENOUS at 16:04

## 2019-01-02 RX ADMIN — SENNA PLUS 2 TABLET(S): 8.6 TABLET ORAL at 21:08

## 2019-01-02 RX ADMIN — FINASTERIDE 5 MILLIGRAM(S): 5 TABLET, FILM COATED ORAL at 10:42

## 2019-01-02 RX ADMIN — Medication 250 MILLIGRAM(S): at 04:29

## 2019-01-02 RX ADMIN — OXYCODONE AND ACETAMINOPHEN 1 TABLET(S): 5; 325 TABLET ORAL at 15:15

## 2019-01-02 RX ADMIN — TAMSULOSIN HYDROCHLORIDE 0.8 MILLIGRAM(S): 0.4 CAPSULE ORAL at 21:08

## 2019-01-02 RX ADMIN — Medication 100 MILLIGRAM(S): at 16:04

## 2019-01-02 RX ADMIN — MEROPENEM 100 MILLIGRAM(S): 1 INJECTION INTRAVENOUS at 09:41

## 2019-01-02 RX ADMIN — OXYCODONE AND ACETAMINOPHEN 1 TABLET(S): 5; 325 TABLET ORAL at 05:15

## 2019-01-02 RX ADMIN — POLYETHYLENE GLYCOL 3350 17 GRAM(S): 17 POWDER, FOR SOLUTION ORAL at 10:41

## 2019-01-02 RX ADMIN — OXYCODONE AND ACETAMINOPHEN 1 TABLET(S): 5; 325 TABLET ORAL at 16:44

## 2019-01-02 RX ADMIN — Medication 100 MILLIGRAM(S): at 21:08

## 2019-01-02 NOTE — PROGRESS NOTE ADULT - SUBJECTIVE AND OBJECTIVE BOX
ANIKET JOSÉ     Chief Complaint: Patient is a 58y old  Male who presents with a chief complaint of called back for bacteremia (2019 14:23)      PAST MEDICAL & SURGICAL HISTORY:  HLD (hyperlipidemia)  Enlarged prostate  No significant past surgical history      HPI/OVERNIGHT EVENTS: Patient complains of pain at the francisco site    MEDICATIONS  (STANDING):  docusate sodium 100 milliGRAM(s) Oral three times a day  ertapenem  IVPB 1000 milliGRAM(s) IV Intermittent every 24 hours  finasteride 5 milliGRAM(s) Oral daily  melatonin 3 milliGRAM(s) Oral at bedtime  polyethylene glycol 3350 17 Gram(s) Oral daily  saccharomyces boulardii 250 milliGRAM(s) Oral two times a day  senna 2 Tablet(s) Oral at bedtime  sodium chloride 0.9%. 1000 milliLiter(s) (125 mL/Hr) IV Continuous <Continuous>  tamsulosin 0.8 milliGRAM(s) Oral at bedtime      Vital Signs Last 24 Hrs  T(C): 37.1 (2019 16:11), Max: 37.1 (2019 16:11)  T(F): 98.8 (2019 16:11), Max: 98.8 (2019 16:11)  HR: 66 (2019 16:11) (66 - 75)  BP: 125/74 (2019 16:11) (124/70 - 137/77)  BP(mean): --  RR: 18 (2019 16:11) (18 - 18)  SpO2: 97% (2019 08:04) (96% - 97%)    PHYSICAL EXAM:  HEENT: PERRLA, EOMI, Normal Hearing  Neck: No LAD, No JVD  Back: No CVA tenderness  Respiratory: CTAB Cardiovascular: S1 and S2, RRR, no M/G/R  Gastrointestinal: BS+, soft, NT/ND  Extremities: No peripheral edema  Vascular: 2+ peripheral pulses  Neurological: A/O x 3, no focal deficits  Francisco in place        CAPILLARY BLOOD GLUCOSE    LABS:                        10.9   9.2   )-----------( 197      ( 2019 08:00 )             33.7     01-02    136  |  102  |  10.0  ----------------------------<  108  4.2   |  24.0  |  0.63    Ca    8.2<L>      2019 08:00  Mg     2.1     -    TPro  6.1<L>  /  Alb  2.9<L>  /  TBili  <0.2<L>  /  DBili  x   /  AST  10  /  ALT  16  /  AlkPhos  77  -      Urinalysis Basic - ( 2019 01:48 )    Color: Yellow / Appearance: Clear / S.010 / pH: x  Gluc: x / Ketone: Negative  / Bili: Negative / Urobili: 1 mg/dL   Blood: x / Protein: 30 mg/dL / Nitrite: Negative   Leuk Esterase: Negative / RBC: 0-2 /HPF / WBC 0-2   Sq Epi: x / Non Sq Epi: Occasional / Bacteria: x        RADIOLOGY & ADDITIONAL TESTS:

## 2019-01-02 NOTE — PROGRESS NOTE ADULT - ATTENDING COMMENTS
Patient is a patient of Dr. Carrera, my partner.  Would recommend leaving francisco in place and continuing antibiotics per ID  to see Dr. Carrera for management of francisco, consideration of prostate biopsy, TURP, etc.

## 2019-01-02 NOTE — PROGRESS NOTE ADULT - PROBLEM SELECTOR PLAN 1
continue IV AB per Med/ID, chk PSA, leave francisco indwelling, continue Flomax and Finasteride, Plan per Urologist on call continue IV AB per Med/ID, chk PSA, leave francisco indwelling, continue Flomax and Finasteride, Plan per Urologist on call - Dr Diaz, may need cysto/TURP vs Suprapubic prostatectomy after infection is cleared

## 2019-01-02 NOTE — PROGRESS NOTE ADULT - SUBJECTIVE AND OBJECTIVE BOX
SURGICAL PA NOTE: Urology consult, urosepsis, BPH, suprapubic pain    STATUS POST:      POST OPERATIVE DAY #:     Vital Signs Last 24 Hrs  T(C): 37 (2019 08:04), Max: 37 (2019 08:04)  T(F): 98.6 (2019 08:04), Max: 98.6 (2019 08:04)  HR: 70 (2019 08:04) (70 - 75)  BP: 137/77 (2019 08:04) (124/70 - 137/77)  BP(mean): --  RR: 18 (2019 08:04) (18 - 18)  SpO2: 97% (2019 08:04) (96% - 97%)    HPI:  Patient is a 58 year old male pt with hx of BPH(on flomax and finasteride) presents to ED after being called by the ed for positive blood cultures. Patient initially presented to the ed with 2 day hx of severe suprapubic pain, dysuria, frequency and dribbling and testicular fullness. Patient also was not feeling well and had decreased po intake. Patient of note has not been urinating normally for over 1 year as per daughter and he admits to purposely not drinking water to avoid urinating. Patient seen at bedside and states he still feels weak but no longer has testicular pain. Patient also states he has been feeling warm at home but did not measure his temperature. Patient denies any other complains    patient receive 1 dose of iv ceft in er and re- cultured (31 Dec 2018 14:24)      Sepsis  Family history of hypertension in father (Father)  Handoff  MEWS Score  HLD (hyperlipidemia)  Enlarged prostate  Sepsis  Urinary retention due to benign prostatic hyperplasia  Prostatitis  ESBL (extended spectrum beta-lactamase) producing bacteria infection  Sepsis due to Escherichia coli (E. coli)  No significant past surgical history  No significant past surgical history  ABNORMAL LABS//CALL BACK  ABNORMAL LABS  1      SUBJECTIVE: Pt seen     Diet:    Activity:     Fevers: [ ]Yes [ ]NO  Chills: [ ] Yes [ ] NO  SOB:  [ ] YES [ ] NO  Dyspnea: [ ]YES [ ]NO  Chest Discomfort: [ ] YES [ ] NO    Nausea: [ ] YES [ ] NO           Vomiting: [ ] YES [ ] NO  Flatus: [ ] YES [ ] NO             Bowel Movement: [ ] YES [ ] NO  Diarrhea: [ ] YES [ ] NO         Void: [ ]YES [ ]No  Constipation: [ ] YES [ ] NO       Pain (0-10):              Pain Control Adequate: [ ] YES [ ] NO    Whitley:    HUSSAIN:      I&O's Detail    2019 07:  -  2019 07:00  --------------------------------------------------------  IN:    sodium chloride 0.9%.: 1500 mL  Total IN: 1500 mL    OUT:    Indwelling Catheter - Urethral: 4700 mL  Total OUT: 4700 mL    Total NET: -3200 mL      2019 07:  -  2019 14:23  --------------------------------------------------------  IN:  Total IN: 0 mL    OUT:    Indwelling Catheter - Urethral: 2000 mL  Total OUT: 2000 mL    Total NET: -2000 mL        I&O's Summary    2019 07:  -  2019 07:00  --------------------------------------------------------  IN: 1500 mL / OUT: 4700 mL / NET: -3200 mL    2019 07:  -  2019 14:23  --------------------------------------------------------  IN: 0 mL / OUT: 2000 mL / NET: -2000 mL          MEDICATIONS  (STANDING):  docusate sodium 100 milliGRAM(s) Oral three times a day  ertapenem  IVPB 1000 milliGRAM(s) IV Intermittent every 24 hours  finasteride 5 milliGRAM(s) Oral daily  melatonin 3 milliGRAM(s) Oral at bedtime  polyethylene glycol 3350 17 Gram(s) Oral daily  saccharomyces boulardii 250 milliGRAM(s) Oral two times a day  senna 2 Tablet(s) Oral at bedtime  sodium chloride 0.9%. 1000 milliLiter(s) (125 mL/Hr) IV Continuous <Continuous>  tamsulosin 0.8 milliGRAM(s) Oral at bedtime    MEDICATIONS  (PRN):  acetaminophen   Tablet .. 650 milliGRAM(s) Oral every 6 hours PRN Temp greater or equal to 38C (100.4F), Mild Pain (1 - 3)  ondansetron Injectable 4 milliGRAM(s) IV Push every 6 hours PRN Nausea and/or Vomiting  oxyCODONE    5 mG/acetaminophen 325 mG 1 Tablet(s) Oral every 4 hours PRN Moderate Pain (4 - 6)  oxyCODONE    5 mG/acetaminophen 325 mG 2 Tablet(s) Oral every 6 hours PRN Severe Pain (7 - 10)      LABS:                        10.9   9.2   )-----------( 197      ( 2019 08:00 )             33.7     01-    136  |  102  |  10.0  ----------------------------<  108  4.2   |  24.0  |  0.63    Ca    8.2<L>      2019 08:00  Mg     2.1     -    TPro  6.1<L>  /  Alb  2.9<L>  /  TBili  <0.2<L>  /  DBili  x   /  AST  10  /  ALT  16  /  AlkPhos  77        Urinalysis Basic - ( 2019 01:48 )    Color: Yellow / Appearance: Clear / S.010 / pH: x  Gluc: x / Ketone: Negative  / Bili: Negative / Urobili: 1 mg/dL   Blood: x / Protein: 30 mg/dL / Nitrite: Negative   Leuk Esterase: Negative / RBC: 0-2 /HPF / WBC 0-2   Sq Epi: x / Non Sq Epi: Occasional / Bacteria: x      blood cx: + e coli     urine cx: + e coli           RADIOLOGY & ADDITIONAL STUDIES:    < from: CT Abdomen and Pelvis w/ IV Cont (18 @ 12:10) >     EXAM:  CT ABDOMEN AND PELVIS IC                          PROCEDURE DATE:  2018          INTERPRETATION:  Clinical information: Back pain. Evaluate for UTI,   prostatitis or pyelonephritis.    Comparison: None    PROCEDURE:   CT of the Abdomen and Pelvis was performed with   intravenous contrast.  100 ml of Omnipaque 350 was injected intravenously. None was discarded.      FINDINGS:    LOWER CHEST: There is subsegmental atelectasis at the lung bases.    ABDOMEN:  LIVER: within normal limits.  BILE DUCTS: normal caliber.  GALLBLADDER: No calcified gallstones. Normal caliber wall.  PANCREAS: within normal limits.  SPLEEN: within normal limits.  ADRENALS: within normal limits.  KIDNEYS: 2.1 cm right renal cyst and subcentimeter hypodensities in the   left kidney, too small to characterize.    PELVIS:  REPRODUCTIVE ORGANS: Prostate gland is enlarged to 5.3 cm.  URETERS: within normal limits.  BLADDER: There is a Arreaga catheter within the bladder.      BOWEL: There are scattered colonic diverticula. No enlarged mesenteric   lymph nodes.  PERITONEUM: no ascites or free air, no fluid collection.  VESSELS: Atherosclerotic changes      RETROPERITONEUM: within normal limits.    ABDOMINAL WALL: within normal limits.  BONES: within normallimits.    IMPRESSION:     Arreaga catheter in bladder.    Prostate gland enlargement.                  SVETLANA SKELTON M.D., ATTENDING RADIOLOGIST  This document has been electronically signed. Dec 30 2018 12:50PM    < end of copied text >      < from:  Kidney and Bladder (.18 @ 09:44) >   EXAM:  US KIDNEYS AND BLADDER                          PROCEDURE DATE:  2018          INTERPRETATION:  Clinical Information: Difficulty urinating.    Comparison: None available.    TECHNIQUE: Ultrasound evaluation of the kidneys and bladder.    FINDINGS:    Right kidney:  10.4 cm in length. There is a 2.7 cm cyst.     Left kidney: 10.4 cm in length. There is a 0.9 cm cyst.     There is no hydronephrosis, solid renal mass or calculus.  Renal cortical   thickness and echogenicity is normal.    The urinary bladder was mildly distended and unremarkable.    Prostate gland is enlarged with a volume of 69 cc.    IMPRESSION:     Bilateral renal cysts.    Prostate gland enlargement.                SVETLANA SKELTON M.D., ATTENDING RADIOLOGIST  This document has been electronically signed. Dec 30 2018 10:02AM    < end of copied text > SURGICAL PA NOTE: Urology consult, urosepsis, BPH, suprapubic pain, urinary retention, francisco indwelling    STATUS POST:      POST OPERATIVE DAY #:     Vital Signs Last 24 Hrs  T(C): 37 (2019 08:04), Max: 37 (2019 08:04)  T(F): 98.6 (2019 08:04), Max: 98.6 (2019 08:04)  HR: 70 (2019 08:04) (70 - 75)  BP: 137/77 (2019 08:04) (124/70 - 137/77)  BP(mean): --  RR: 18 (2019 08:04) (18 - 18)  SpO2: 97% (2019 08:04) (96% - 97%)    HPI:  Patient is a 58 year old male pt with hx of BPH(on flomax and finasteride) presents to ED after being called by the ed for positive blood cultures. Patient initially presented to the ed with 2 day hx of severe suprapubic pain, dysuria, frequency and dribbling and testicular fullness. Patient also was not feeling well and had decreased po intake. Patient of note has not been urinating normally for over 1 year as per daughter and he admits to purposely not drinking water to avoid urinating. Patient seen at bedside and states he still feels weak but no longer has testicular pain. Patient also states he has been feeling warm at home but did not measure his temperature. Patient denies any other complains    patient receive 1 dose of iv ceft in er and re- cultured (31 Dec 2018 14:24)      Sepsis  Family history of hypertension in father (Father)  Handoff  MEWS Score  HLD (hyperlipidemia)  Enlarged prostate  Sepsis  Urinary retention due to benign prostatic hyperplasia  Prostatitis  ESBL (extended spectrum beta-lactamase) producing bacteria infection  Sepsis due to Escherichia coli (E. coli)  No significant past surgical history  No significant past surgical history  ABNORMAL LABS//CALL BACK  ABNORMAL LABS  1  Info obtained thru     SUBJECTIVE: Pt seen lying supine with HOB up, francisco indwelling, no c/o abd pain, denies current fever/chills, pt has sx of BPH x 1 year, states recent seen by DR Carrera - [facility] and started on PO Finasteride and Flomax with any resolution of Sx, presented to ER with Sx c/w urosepsis and UR    Diet:  reg    Activity:     Fevers: [ ]Yes [ ]NO  Chills: [ ] Yes [ ] NO  SOB:  [ ] YES [ ] NO  Dyspnea: [ ]YES [ ]NO  Chest Discomfort: [ ] YES [ ] NO    Nausea: [ ] YES [ ] NO           Vomiting: [ ] YES [ ] NO  Flatus: [ ] YES [ ] NO             Bowel Movement: [ ] YES [ ] NO  Diarrhea: [ ] YES [ ] NO         Void: [ ]YES [ ]No  Constipation: [ ] YES [ ] NO       Pain (0-10):              Pain Control Adequate: [ ] YES [ ] NO    Francisco: indwelling    NGT:      I&O's Detail    2019 07:  -  2019 07:00  --------------------------------------------------------  IN:    sodium chloride 0.9%.: 1500 mL  Total IN: 1500 mL    OUT:    Indwelling Catheter - Urethral: 4700 mL  Total OUT: 4700 mL    Total NET: -3200 mL      2019 07:  -  2019 14:23  --------------------------------------------------------  IN:  Total IN: 0 mL    OUT:    Indwelling Catheter - Urethral: 2000 mL  Total OUT: 2000 mL    Total NET: -2000 mL        I&O's Summary    2019 07:  -  2019 07:00  --------------------------------------------------------  IN: 1500 mL / OUT: 4700 mL / NET: -3200 mL    2019 07:  -  2019 14:23  --------------------------------------------------------  IN: 0 mL / OUT: 2000 mL / NET: -2000 mL          MEDICATIONS  (STANDING):  docusate sodium 100 milliGRAM(s) Oral three times a day  ertapenem  IVPB 1000 milliGRAM(s) IV Intermittent every 24 hours  finasteride 5 milliGRAM(s) Oral daily  melatonin 3 milliGRAM(s) Oral at bedtime  polyethylene glycol 3350 17 Gram(s) Oral daily  saccharomyces boulardii 250 milliGRAM(s) Oral two times a day  senna 2 Tablet(s) Oral at bedtime  sodium chloride 0.9%. 1000 milliLiter(s) (125 mL/Hr) IV Continuous <Continuous>  tamsulosin 0.8 milliGRAM(s) Oral at bedtime    MEDICATIONS  (PRN):  acetaminophen   Tablet .. 650 milliGRAM(s) Oral every 6 hours PRN Temp greater or equal to 38C (100.4F), Mild Pain (1 - 3)  ondansetron Injectable 4 milliGRAM(s) IV Push every 6 hours PRN Nausea and/or Vomiting  oxyCODONE    5 mG/acetaminophen 325 mG 1 Tablet(s) Oral every 4 hours PRN Moderate Pain (4 - 6)  oxyCODONE    5 mG/acetaminophen 325 mG 2 Tablet(s) Oral every 6 hours PRN Severe Pain (7 - 10)      LABS:                        10.9   9.2   )-----------( 197      ( 2019 08:00 )             33.7     01-02    136  |  102  |  10.0  ----------------------------<  108  4.2   |  24.0  |  0.63    Ca    8.2<L>      2019 08:00  Mg     2.1     -    TPro  6.1<L>  /  Alb  2.9<L>  /  TBili  <0.2<L>  /  DBili  x   /  AST  10  /  ALT  16  /  AlkPhos  77  01      Urinalysis Basic - ( 2019 01:48 )    Color: Yellow / Appearance: Clear / S.010 / pH: x  Gluc: x / Ketone: Negative  / Bili: Negative / Urobili: 1 mg/dL   Blood: x / Protein: 30 mg/dL / Nitrite: Negative   Leuk Esterase: Negative / RBC: 0-2 /HPF / WBC 0-2   Sq Epi: x / Non Sq Epi: Occasional / Bacteria: x      blood cx: + e coli     urine cx: + e coli           RADIOLOGY & ADDITIONAL STUDIES:    < from: CT Abdomen and Pelvis w/ IV Cont (18 @ 12:10) >     EXAM:  CT ABDOMEN AND PELVIS IC                          PROCEDURE DATE:  2018          INTERPRETATION:  Clinical information: Back pain. Evaluate for UTI,   prostatitis or pyelonephritis.    Comparison: None    PROCEDURE:   CT of the Abdomen and Pelvis was performed with   intravenous contrast.  100 ml of Omnipaque 350 was injected intravenously. None was discarded.      FINDINGS:    LOWER CHEST: There is subsegmental atelectasis at the lung bases.    ABDOMEN:  LIVER: within normal limits.  BILE DUCTS: normal caliber.  GALLBLADDER: No calcified gallstones. Normal caliber wall.  PANCREAS: within normal limits.  SPLEEN: within normal limits.  ADRENALS: within normal limits.  KIDNEYS: 2.1 cm right renal cyst and subcentimeter hypodensities in the   left kidney, too small to characterize.    PELVIS:  REPRODUCTIVE ORGANS: Prostate gland is enlarged to 5.3 cm.  URETERS: within normal limits.  BLADDER: There is a Francisco catheter within the bladder.      BOWEL: There are scattered colonic diverticula. No enlarged mesenteric   lymph nodes.  PERITONEUM: no ascites or free air, no fluid collection.  VESSELS: Atherosclerotic changes      RETROPERITONEUM: within normal limits.    ABDOMINAL WALL: within normal limits.  BONES: within normallimits.    IMPRESSION:     Francisco catheter in bladder.    Prostate gland enlargement.                  SVETLANA SKELTON M.D., ATTENDING RADIOLOGIST  This document has been electronically signed. Dec 30 2018 12:50PM    < end of copied text >      < from: US Kidney and Bladder (18 @ 09:44) >   EXAM:  US KIDNEYS AND BLADDER                          PROCEDURE DATE:  2018          INTERPRETATION:  Clinical Information: Difficulty urinating.    Comparison: None available.    TECHNIQUE: Ultrasound evaluation of the kidneys and bladder.    FINDINGS:    Right kidney:  10.4 cm in length. There is a 2.7 cm cyst.     Left kidney: 10.4 cm in length. There is a 0.9 cm cyst.     There is no hydronephrosis, solid renal mass or calculus.  Renal cortical   thickness and echogenicity is normal.    The urinary bladder was mildly distended and unremarkable.    Prostate gland is enlarged with a volume of 69 cc.    IMPRESSION:     Bilateral renal cysts.    Prostate gland enlargement.                SVETLANA SKELTON M.D., ATTENDING RADIOLOGIST  This document has been electronically signed. Dec 30 2018 10:02AM    < end of copied text >

## 2019-01-02 NOTE — PROGRESS NOTE ADULT - ASSESSMENT
59 y/o M with h/o BPH here with ESBL E coli septicemia likely due to prostatitis       ESBL E coli septicemia  Prostatitis  Urinary retention   BPH    - Blood cultures with ESBL E coli  - Will Switch to Ertapenem  - Repeat blood cultures pending  - Arreaga was placed due to urinary retention   - Will need urology eval, can be in or out patient  - Trend fevers  - Trend leukocytosis      Will Follow

## 2019-01-02 NOTE — PROGRESS NOTE ADULT - SUBJECTIVE AND OBJECTIVE BOX
[facility]  INFECTIOUS DISEASES AND INTERNAL MEDICINE at [place]  =======================================================  Reji Hagen MD  Diplomates American Board of Internal Medicine and Infectious Diseases  =======================================================    ANIKET JOSÉ [id number]    Translation by      Follow up: ESBL E coli    No fevers or chills  No complaints  Reports feeling better      Allergies:  aspirin (Rash)      Antibiotics:  ertapenem  IVPB 1000 milliGRAM(s) IV Intermittent every 24 hours       REVIEW OF SYSTEMS:  CONSTITUTIONAL:  No Fever or chills  HEENT:   No diplopia or blurred vision.  No earache, sore throat or runny nose.  CARDIOVASCULAR:  No pressure, squeezing, strangling, tightness, heaviness or aching about the chest, neck, axilla or epigastrium.  RESPIRATORY:  No cough, shortness of breath  GASTROINTESTINAL:  No nausea, vomiting or diarrhea.  GENITOURINARY:  + Arreaga  MUSCULOSKELETAL:  no joint aches, no muscle pain  SKIN:  No change in skin, hair or nails.  NEUROLOGIC:  No paresthesias, fasciculations  PSYCHIATRIC:  No disorder of thought or mood.  ENDOCRINE:  No heat or cold intolerance  HEMATOLOGICAL:  No easy bruising or bleeding.       Physical Exam:  Vital Signs Last 24 Hrs  T(C): 36.9 (2019 00:32), Max: 36.9 (2019 00:32)  T(F): 98.4 (2019 00:32), Max: 98.4 (2019 00:32)  HR: 75 (2019 00:32) (75 - 75)  BP: 124/70 (2019 00:32) (124/70 - 124/70)  RR: 18 (2019 00:32) (18 - 18)  SpO2: 96% (2019 00:32) (96% - 96%)      GEN: NAD  HEENT: normocephalic and atraumatic. EOMI. PERRL.  Anicteric   NECK: Supple.   LUNGS: Clear to auscultation.  HEART: Regular rate and rhythm   ABDOMEN: Soft, nontender, and nondistended.  Positive bowel sounds.    : + Arreaga  EXTREMITIES: Without any edema.  MSK: no joint swelling  NEUROLOGIC:  No focal deficits  PSYCHIATRIC: Appropriate affect .  SKIN: No Rash      Labs:      136  |  102  |  10.0  ----------------------------<  108  4.2   |  24.0  |  0.63    Ca    8.2<L>      2019 08:00  Mg     2.1         TPro  6.1<L>  /  Alb  2.9<L>  /  TBili  <0.2<L>  /  DBili  x   /  AST  10  /  ALT  16  /  AlkPhos  77                 10.9   9.2   )-----------( 197      ( 2019 08:00 )             33.7       PT/INR - ( 31 Dec 2018 12:44 )   PT: 16.3 sec;   INR: 1.40 ratio         PTT - ( 31 Dec 2018 12:44 )  PTT:30.0 sec  Urinalysis Basic - ( 2019 01:48 )    Color: Yellow / Appearance: Clear / S.010 / pH: x  Gluc: x / Ketone: Negative  / Bili: Negative / Urobili: 1 mg/dL   Blood: x / Protein: 30 mg/dL / Nitrite: Negative   Leuk Esterase: Negative / RBC: 0-2 /HPF / WBC 0-2   Sq Epi: x / Non Sq Epi: Occasional / Bacteria: x      LIVER FUNCTIONS - ( 2019 08:00 )  Alb: 2.9 g/dL / Pro: 6.1 g/dL / ALK PHOS: 77 U/L / ALT: 16 U/L / AST: 10 U/L / GGT: x             RECENT CULTURES:   @ 01:49 .Urine     No growth      12-30 @ 10:19 .Blood Escherichia coli ESBL  Blood Culture PCR    Growth in aerobic bottle: Escherichia coli ESBL  Aerobic Bottle: 11:18 Hours to positivity  Anaerobic Bottle: No growth to date  ***Blood Panel PCR results on this specimen are available  approximately 3 hours after the Gram stain result.***  Gram stain, PCR, and/or culture results may not always  correspond due to difference in methodologies.  ************************************************************  This PCR assay was performed using [employer].  The following targets are testedfor: Enterococcus,  vancomycin resistant enterococci, Listeria monocytogenes,  coagulase negative staphylococci, S. aureus,  methicillin resistant S. aureus, Streptococcus agalactiae  (Group B), S. pneumoniae, S. pyogenes (Group A),  Acinetobacter baumannii, Enterobacter cloacae, E. coli,  Klebsiella oxytoca, K. pneumoniae, Proteus sp.,  Serratia marcescens, Haemophilus influenzae,  Neisseria meningitidis, Pseudomonas aeruginosa, Candida  albicans, C. glabrata, C krusei, C parapsilosis,  C. tropicalis and the KPC resistance gene.  "Due to technical problems, Proteus sp. will Not be reported as part of  the BCID panel until further notice"       @ 08:04 .Urine Escherichia coli ESBL    >100,000 CFU/ml Escherichia coli ESBL        EXAM:  CT ABDOMEN AND PELVIS IC                        PROCEDURE DATE:  2018    INTERPRETATION:  Clinical information: Back pain. Evaluate for UTI,   prostatitis or pyelonephritis.  Comparison: None  PROCEDURE:   CT of the Abdomen and Pelvis was performed with   intravenous contrast.  100 ml of Omnipaque 350 was injected intravenously. None was discarded.  FINDINGS:  LOWER CHEST: There is subsegmental atelectasis at the lung bases.  ABDOMEN:  LIVER: within normal limits.  BILE DUCTS: normal caliber.  GALLBLADDER: No calcified gallstones. Normal caliber wall.  PANCREAS: within normal limits.  SPLEEN: within normal limits.  ADRENALS: within normal limits.  KIDNEYS: 2.1 cm right renal cyst and subcentimeter hypodensities in the   left kidney, too small to characterize.  PELVIS:  REPRODUCTIVE ORGANS: Prostate gland is enlarged to 5.3 cm.  URETERS: within normal limits.  BLADDER: There is a Arreaga catheter within the bladder.  BOWEL: There are scattered colonic diverticula. No enlarged mesenteric lymph nodes.  PERITONEUM: no ascites or free air, no fluid collection.  VESSELS: Atherosclerotic changes      RETROPERITONEUM: within normal limits.    ABDOMINAL WALL: within normal limits.  BONES: within normallimits.  IMPRESSION:   Arreaga catheter in bladder.  Prostate gland enlargement.

## 2019-01-03 DIAGNOSIS — R97.20 ELEVATED PROSTATE SPECIFIC ANTIGEN [PSA]: ICD-10-CM

## 2019-01-03 LAB — PSA FLD-MCNC: 18.16 NG/ML — HIGH (ref 0–4)

## 2019-01-03 PROCEDURE — 99232 SBSQ HOSP IP/OBS MODERATE 35: CPT

## 2019-01-03 RX ORDER — ERTAPENEM SODIUM 1 G/1
1 INJECTION, POWDER, LYOPHILIZED, FOR SOLUTION INTRAMUSCULAR; INTRAVENOUS
Qty: 28 | Refills: 0 | OUTPATIENT
Start: 2019-01-03 | End: 2019-01-30

## 2019-01-03 RX ORDER — ENOXAPARIN SODIUM 100 MG/ML
40 INJECTION SUBCUTANEOUS DAILY
Qty: 0 | Refills: 0 | Status: DISCONTINUED | OUTPATIENT
Start: 2019-01-03 | End: 2019-01-04

## 2019-01-03 RX ADMIN — OXYCODONE AND ACETAMINOPHEN 2 TABLET(S): 5; 325 TABLET ORAL at 10:30

## 2019-01-03 RX ADMIN — TAMSULOSIN HYDROCHLORIDE 0.8 MILLIGRAM(S): 0.4 CAPSULE ORAL at 22:26

## 2019-01-03 RX ADMIN — Medication 100 MILLIGRAM(S): at 10:01

## 2019-01-03 RX ADMIN — Medication 100 MILLIGRAM(S): at 22:26

## 2019-01-03 RX ADMIN — FINASTERIDE 5 MILLIGRAM(S): 5 TABLET, FILM COATED ORAL at 10:01

## 2019-01-03 RX ADMIN — Medication 3 MILLIGRAM(S): at 22:26

## 2019-01-03 RX ADMIN — ERTAPENEM SODIUM 120 MILLIGRAM(S): 1 INJECTION, POWDER, LYOPHILIZED, FOR SOLUTION INTRAMUSCULAR; INTRAVENOUS at 14:41

## 2019-01-03 RX ADMIN — ENOXAPARIN SODIUM 40 MILLIGRAM(S): 100 INJECTION SUBCUTANEOUS at 10:00

## 2019-01-03 RX ADMIN — OXYCODONE AND ACETAMINOPHEN 2 TABLET(S): 5; 325 TABLET ORAL at 19:51

## 2019-01-03 RX ADMIN — Medication 100 MILLIGRAM(S): at 14:41

## 2019-01-03 RX ADMIN — OXYCODONE AND ACETAMINOPHEN 2 TABLET(S): 5; 325 TABLET ORAL at 09:59

## 2019-01-03 RX ADMIN — Medication 250 MILLIGRAM(S): at 14:41

## 2019-01-03 RX ADMIN — SENNA PLUS 2 TABLET(S): 8.6 TABLET ORAL at 22:26

## 2019-01-03 RX ADMIN — POLYETHYLENE GLYCOL 3350 17 GRAM(S): 17 POWDER, FOR SOLUTION ORAL at 10:01

## 2019-01-03 NOTE — PROGRESS NOTE ADULT - SUBJECTIVE AND OBJECTIVE BOX
ANIKET JOSÉ     Chief Complaint: Patient is a 58y old  Male who presents with a chief complaint of called back for bacteremia (03 Jan 2019 11:56)      PAST MEDICAL & SURGICAL HISTORY:  HLD (hyperlipidemia)  Enlarged prostate  No significant past surgical history      HPI/OVERNIGHT EVENTS: Arreaga in place. Id input appreciated. Midline ordered for antibiotic therpay thru 1/28    MEDICATIONS  (STANDING):  docusate sodium 100 milliGRAM(s) Oral three times a day  enoxaparin Injectable 40 milliGRAM(s) SubCutaneous daily  ertapenem  IVPB 1000 milliGRAM(s) IV Intermittent every 24 hours  finasteride 5 milliGRAM(s) Oral daily  melatonin 3 milliGRAM(s) Oral at bedtime  polyethylene glycol 3350 17 Gram(s) Oral daily  saccharomyces boulardii 250 milliGRAM(s) Oral two times a day  senna 2 Tablet(s) Oral at bedtime  tamsulosin 0.8 milliGRAM(s) Oral at bedtime      Vital Signs Last 24 Hrs  T(C): 36.6 (03 Jan 2019 15:51), Max: 36.8 (03 Jan 2019 00:11)  T(F): 97.8 (03 Jan 2019 15:51), Max: 98.3 (03 Jan 2019 00:11)  HR: 63 (03 Jan 2019 15:51) (63 - 71)  BP: 134/78 (03 Jan 2019 15:51) (129/74 - 134/78)  BP(mean): --  RR: 18 (03 Jan 2019 15:51) (18 - 18)  SpO2: 99% (03 Jan 2019 15:51) (96% - 99%)    PHYSICAL EXAM:  HEENT: PERRLA, EOMI, Normal Hearing  Neck: No LAD, No JVD  Back: No CVA tenderness  Respiratory: CTAB Cardiovascular: S1 and S2, RRR, no M/G/R  Gastrointestinal: BS+, soft, NT/ND  Extremities: No peripheral edema  Vascular: 2+ peripheral pulses  Neurological: A/O x 3, no focal deficits        CAPILLARY BLOOD GLUCOSE    LABS:                        10.9   9.2   )-----------( 197      ( 02 Jan 2019 08:00 )             33.7     01-02    136  |  102  |  10.0  ----------------------------<  108  4.2   |  24.0  |  0.63    Ca    8.2<L>      02 Jan 2019 08:00  Mg     2.1     01-02    TPro  6.1<L>  /  Alb  2.9<L>  /  TBili  <0.2<L>  /  DBili  x   /  AST  10  /  ALT  16  /  AlkPhos  77  01-02          RADIOLOGY & ADDITIONAL TESTS:

## 2019-01-03 NOTE — PROGRESS NOTE ADULT - ASSESSMENT
59 y/o M with h/o BPH here with ESBL E coli septicemia likely due to prostatitis       ESBL E coli septicemia  Prostatitis  Urinary retention   BPH    - Blood cultures with ESBL E coli  - Continue Ertapenem  - Repeat blood cultures no growth 12/31/18  - Arreaga was placed due to urinary retention   - urology eval noted  - Trend fevers  - Trend leukocytosis  - Will place midline  - End date 1/28/19  - Will need weekly CBC and CMP faxed to [phone]  - Appointment with us in 14 to 21 days - [phone]      Will Follow

## 2019-01-03 NOTE — PROGRESS NOTE ADULT - SUBJECTIVE AND OBJECTIVE BOX
[facility]  INFECTIOUS DISEASES AND INTERNAL MEDICINE at [place]  =======================================================  Reji Hagen MD  Diplomates American Board of Internal Medicine and Infectious Diseases  =======================================================    LAINEY JOSÉBERTO [id number]    Translation by  [id number]    Follow up: ESBL E coli    No fevers or chills  No complaints  Reports feeling better      Allergies:  aspirin (Rash)      Antibiotics:  ertapenem  IVPB 1000 milliGRAM(s) IV Intermittent every 24 hours       REVIEW OF SYSTEMS:  CONSTITUTIONAL:  No Fever or chills  HEENT:   No diplopia or blurred vision.  No earache, sore throat or runny nose.  CARDIOVASCULAR:  No pressure, squeezing, strangling, tightness, heaviness or aching about the chest, neck, axilla or epigastrium.  RESPIRATORY:  No cough, shortness of breath  GASTROINTESTINAL:  No nausea, vomiting or diarrhea.  GENITOURINARY:  + Arreaga  MUSCULOSKELETAL:  no joint aches, no muscle pain  SKIN:  No change in skin, hair or nails.  NEUROLOGIC:  No paresthesias, fasciculations  PSYCHIATRIC:  No disorder of thought or mood.  ENDOCRINE:  No heat or cold intolerance  HEMATOLOGICAL:  No easy bruising or bleeding.       Physical Exam:  Vital Signs Last 24 Hrs  T(C): 36.8 (03 Jan 2019 00:11), Max: 37.1 (02 Jan 2019 16:11)  T(F): 98.3 (03 Jan 2019 00:11), Max: 98.8 (02 Jan 2019 16:11)  HR: 71 (03 Jan 2019 00:11) (66 - 71)  BP: 129/74 (03 Jan 2019 00:11) (125/74 - 129/74)  RR: 18 (03 Jan 2019 00:11) (18 - 18)  SpO2: 96% (03 Jan 2019 00:11) (96% - 96%)      GEN: NAD  HEENT: normocephalic and atraumatic. EOMI. PERRL.  Anicteric   NECK: Supple.   LUNGS: Clear to auscultation.  HEART: Regular rate and rhythm   ABDOMEN: Soft, nontender, and nondistended.  Positive bowel sounds.    : + Arreaga  EXTREMITIES: Without any edema.  MSK: no joint swelling  NEUROLOGIC:  No focal deficits  PSYCHIATRIC: Appropriate affect .  SKIN: No Rash      Labs:  01-02    136  |  102  |  10.0  ----------------------------<  108  4.2   |  24.0  |  0.63    Ca    8.2<L>      02 Jan 2019 08:00  Mg     2.1     01-02    TPro  6.1<L>  /  Alb  2.9<L>  /  TBili  <0.2<L>  /  DBili  x   /  AST  10  /  ALT  16  /  AlkPhos  77  01-02               10.9   9.2   )-----------( 197      ( 02 Jan 2019 08:00 )             33.7     LIVER FUNCTIONS - ( 02 Jan 2019 08:00 )  Alb: 2.9 g/dL / Pro: 6.1 g/dL / ALK PHOS: 77 U/L / ALT: 16 U/L / AST: 10 U/L / GGT: x             RECENT CULTURES:  01-01 @ 01:49 .Urine     No growth      12-31 @ 12:48 .Blood     No growth at 48 hours      12-30 @ 10:19 .Blood Escherichia coli ESBL  Blood Culture PCR    Growth in aerobic bottle: Escherichia coli ESBL  Aerobic Bottle: 11:18 Hours to positivity  Anaerobic Bottle: No growth to date  ***Blood Panel PCR results on this specimen are available  approximately 3 hours after the Gram stain result.***  Gram stain, PCR, and/or culture results may not always  correspond due to difference in methodologies.  ************************************************************  This PCR assay was performed using [employer].  The following targets are testedfor: Enterococcus,  vancomycin resistant enterococci, Listeria monocytogenes,  coagulase negative staphylococci, S. aureus,  methicillin resistant S. aureus, Streptococcus agalactiae  (Group B), S. pneumoniae, S. pyogenes (Group A),  Acinetobacter baumannii, Enterobacter cloacae, E. coli,  Klebsiella oxytoca, K. pneumoniae, Proteus sp.,  Serratia marcescens, Haemophilus influenzae,  Neisseria meningitidis, Pseudomonas aeruginosa, Candida  albicans, C. glabrata, C krusei, C parapsilosis,  C. tropicalis and the KPC resistance gene.  "Due to technical problems, Proteus sp. will Not be reported as part of  the BCID panel until further notice"      12-30 @ 08:04 .Urine Escherichia coli ESBL    >100,000 CFU/ml Escherichia coli ESBL          EXAM:  CT ABDOMEN AND PELVIS IC                        PROCEDURE DATE:  12/30/2018    INTERPRETATION:  Clinical information: Back pain. Evaluate for UTI,   prostatitis or pyelonephritis.  Comparison: None  PROCEDURE:   CT of the Abdomen and Pelvis was performed with   intravenous contrast.  100 ml of Omnipaque 350 was injected intravenously. None was discarded.  FINDINGS:  LOWER CHEST: There is subsegmental atelectasis at the lung bases.  ABDOMEN:  LIVER: within normal limits.  BILE DUCTS: normal caliber.  GALLBLADDER: No calcified gallstones. Normal caliber wall.  PANCREAS: within normal limits.  SPLEEN: within normal limits.  ADRENALS: within normal limits.  KIDNEYS: 2.1 cm right renal cyst and subcentimeter hypodensities in the   left kidney, too small to characterize.  PELVIS:  REPRODUCTIVE ORGANS: Prostate gland is enlarged to 5.3 cm.  URETERS: within normal limits.  BLADDER: There is a Arreaga catheter within the bladder.  BOWEL: There are scattered colonic diverticula. No enlarged mesenteric lymph nodes.  PERITONEUM: no ascites or free air, no fluid collection.  VESSELS: Atherosclerotic changes      RETROPERITONEUM: within normal limits.    ABDOMINAL WALL: within normal limits.  BONES: within normal limits.  IMPRESSION:   Arreaga catheter in bladder.  Prostate gland enlargement.

## 2019-01-03 NOTE — PROGRESS NOTE ADULT - ASSESSMENT
58 year old male esbl/uti francisco in place. ID and urology input appreciated.  Patient is scheduled to remain on ertapanem thru 1/28. 58 year old male esbl/uti francisco in place. ID and urology input appreciated.  Patient is scheduled to remain on ertapanem thru 1/28. Markedly elevated PSA.

## 2019-01-04 ENCOUNTER — TRANSCRIPTION ENCOUNTER (OUTPATIENT)
Age: 59
End: 2019-01-04

## 2019-01-04 VITALS
RESPIRATION RATE: 18 BRPM | TEMPERATURE: 98 F | HEART RATE: 77 BPM | DIASTOLIC BLOOD PRESSURE: 76 MMHG | OXYGEN SATURATION: 98 % | SYSTOLIC BLOOD PRESSURE: 122 MMHG

## 2019-01-04 LAB
CULTURE RESULTS: SIGNIFICANT CHANGE UP
CULTURE RESULTS: SIGNIFICANT CHANGE UP
ORGANISM # SPEC MICROSCOPIC CNT: SIGNIFICANT CHANGE UP
SPECIMEN SOURCE: SIGNIFICANT CHANGE UP
SPECIMEN SOURCE: SIGNIFICANT CHANGE UP

## 2019-01-04 PROCEDURE — 87040 BLOOD CULTURE FOR BACTERIA: CPT

## 2019-01-04 PROCEDURE — 93005 ELECTROCARDIOGRAM TRACING: CPT

## 2019-01-04 PROCEDURE — 83605 ASSAY OF LACTIC ACID: CPT

## 2019-01-04 PROCEDURE — 85610 PROTHROMBIN TIME: CPT

## 2019-01-04 PROCEDURE — 81001 URINALYSIS AUTO W/SCOPE: CPT

## 2019-01-04 PROCEDURE — 99232 SBSQ HOSP IP/OBS MODERATE 35: CPT

## 2019-01-04 PROCEDURE — T1013: CPT

## 2019-01-04 PROCEDURE — 99239 HOSP IP/OBS DSCHRG MGMT >30: CPT

## 2019-01-04 PROCEDURE — 99285 EMERGENCY DEPT VISIT HI MDM: CPT | Mod: 25

## 2019-01-04 PROCEDURE — 83735 ASSAY OF MAGNESIUM: CPT

## 2019-01-04 PROCEDURE — 87086 URINE CULTURE/COLONY COUNT: CPT

## 2019-01-04 PROCEDURE — 80074 ACUTE HEPATITIS PANEL: CPT

## 2019-01-04 PROCEDURE — G0103: CPT

## 2019-01-04 PROCEDURE — 36415 COLL VENOUS BLD VENIPUNCTURE: CPT

## 2019-01-04 PROCEDURE — 80048 BASIC METABOLIC PNL TOTAL CA: CPT

## 2019-01-04 PROCEDURE — 85027 COMPLETE CBC AUTOMATED: CPT

## 2019-01-04 PROCEDURE — 96365 THER/PROPH/DIAG IV INF INIT: CPT

## 2019-01-04 PROCEDURE — 85730 THROMBOPLASTIN TIME PARTIAL: CPT

## 2019-01-04 PROCEDURE — 80053 COMPREHEN METABOLIC PANEL: CPT

## 2019-01-04 PROCEDURE — 84145 PROCALCITONIN (PCT): CPT

## 2019-01-04 RX ORDER — FINASTERIDE 5 MG/1
1 TABLET, FILM COATED ORAL
Qty: 30 | Refills: 0
Start: 2019-01-04 | End: 2019-02-02

## 2019-01-04 RX ORDER — PHENAZOPYRIDINE HCL 100 MG
1 TABLET ORAL
Qty: 9 | Refills: 0 | OUTPATIENT
Start: 2019-01-04 | End: 2019-01-06

## 2019-01-04 RX ORDER — POLYETHYLENE GLYCOL 3350 17 G/17G
17 POWDER, FOR SOLUTION ORAL
Qty: 30 | Refills: 0 | OUTPATIENT
Start: 2019-01-04 | End: 2019-02-02

## 2019-01-04 RX ORDER — SENNA PLUS 8.6 MG/1
2 TABLET ORAL
Qty: 60 | Refills: 0 | OUTPATIENT
Start: 2019-01-04 | End: 2019-02-02

## 2019-01-04 RX ORDER — PHENAZOPYRIDINE HCL 100 MG
200 TABLET ORAL THREE TIMES A DAY
Qty: 0 | Refills: 0 | Status: DISCONTINUED | OUTPATIENT
Start: 2019-01-04 | End: 2019-01-04

## 2019-01-04 RX ORDER — FLUTICASONE FUROATE AND VILANTEROL TRIFENATATE 100; 25 UG/1; UG/1
1 POWDER RESPIRATORY (INHALATION)
Qty: 1 | Refills: 0 | OUTPATIENT
Start: 2019-01-04 | End: 2019-02-02

## 2019-01-04 RX ORDER — LANOLIN ALCOHOL/MO/W.PET/CERES
1 CREAM (GRAM) TOPICAL
Qty: 30 | Refills: 0 | OUTPATIENT
Start: 2019-01-04 | End: 2019-02-02

## 2019-01-04 RX ORDER — ZOLPIDEM TARTRATE 10 MG/1
1 TABLET ORAL
Qty: 10 | Refills: 0 | OUTPATIENT
Start: 2019-01-04 | End: 2019-01-13

## 2019-01-04 RX ORDER — SACCHAROMYCES BOULARDII 250 MG
1 POWDER IN PACKET (EA) ORAL
Qty: 48 | Refills: 0 | OUTPATIENT
Start: 2019-01-04 | End: 2019-01-27

## 2019-01-04 RX ORDER — DOCUSATE SODIUM 100 MG
1 CAPSULE ORAL
Qty: 90 | Refills: 0 | OUTPATIENT
Start: 2019-01-04 | End: 2019-02-02

## 2019-01-04 RX ORDER — TAMSULOSIN HYDROCHLORIDE 0.4 MG/1
1 CAPSULE ORAL
Qty: 30 | Refills: 0 | OUTPATIENT
Start: 2019-01-04 | End: 2019-02-02

## 2019-01-04 RX ADMIN — POLYETHYLENE GLYCOL 3350 17 GRAM(S): 17 POWDER, FOR SOLUTION ORAL at 13:00

## 2019-01-04 RX ADMIN — ENOXAPARIN SODIUM 40 MILLIGRAM(S): 100 INJECTION SUBCUTANEOUS at 13:00

## 2019-01-04 RX ADMIN — Medication 100 MILLIGRAM(S): at 06:52

## 2019-01-04 RX ADMIN — OXYCODONE AND ACETAMINOPHEN 2 TABLET(S): 5; 325 TABLET ORAL at 02:09

## 2019-01-04 RX ADMIN — ERTAPENEM SODIUM 120 MILLIGRAM(S): 1 INJECTION, POWDER, LYOPHILIZED, FOR SOLUTION INTRAMUSCULAR; INTRAVENOUS at 15:24

## 2019-01-04 RX ADMIN — FINASTERIDE 5 MILLIGRAM(S): 5 TABLET, FILM COATED ORAL at 13:00

## 2019-01-04 RX ADMIN — Medication 250 MILLIGRAM(S): at 06:51

## 2019-01-04 RX ADMIN — Medication 100 MILLIGRAM(S): at 13:00

## 2019-01-04 RX ADMIN — Medication 200 MILLIGRAM(S): at 13:00

## 2019-01-04 NOTE — PROCEDURE NOTE - NSPROCDETAILS_GEN_ALL_CORE
sterile dressing applied/sterile technique, catheter placed/ultrasound guidance/supine position/ultrasound assessment/location identified, draped/prepped, sterile technique used

## 2019-01-04 NOTE — PROGRESS NOTE ADULT - PROBLEM SELECTOR PROBLEM 4
Urinary retention due to benign prostatic hyperplasia
Mixed hyperlipidemia

## 2019-01-04 NOTE — PROGRESS NOTE ADULT - PROBLEM SELECTOR PLAN 1
Continue meropenem thru 1/28. Midline ordered.  Add pyridium for 3 days as well as percocet for pain at francisco site.

## 2019-01-04 NOTE — PROGRESS NOTE ADULT - ASSESSMENT
59 y/o M with h/o BPH here with ESBL E coli septicemia likely due to prostatitis       ESBL E coli septicemia  Prostatitis  Urinary retention   BPH    - Blood cultures with ESBL E coli  - Continue Ertapenem  - Repeat blood cultures no growth 12/31/18  - Arreaga was placed due to urinary retention   - urology eval noted  - Trend fevers  - Trend leukocytosis  - Will place midline  - End date 1/28/19  - Will need weekly CBC and CMP faxed to [phone]  - Appointment with us in 14 to 21 days - [phone]      Will Sign Off. Please call PRN.

## 2019-01-04 NOTE — PROGRESS NOTE ADULT - ASSESSMENT
58 year old male prostatis requiring francisco with esbl/uti. ID and urology input appreciated.  Patient is scheduled to remain on ertapanem thru 1/28. Markedly elevated PSA. 1/4 Patient had midline in place and is stable for discharge with homecare.  Patient's spouse complains he has a hemmorhoid. Follow up with infectious disease as an outpatient.

## 2019-01-04 NOTE — DISCHARGE NOTE ADULT - CARE PLAN
Principal Discharge DX:	Acute prostatitis  Goal:	Resolve infection  Assessment and plan of treatment:	Complete course of antibiotics and follow up with Infectious Disease  Secondary Diagnosis:	Elevated PSA  Goal:	Follow up with urology  Secondary Diagnosis:	Enlarged prostate  Goal:	Follow up with Urology  Secondary Diagnosis:	Mixed hyperlipidemia  Goal:	Target LDL less than 100  Secondary Diagnosis:	Sepsis due to Escherichia coli (E. coli)  Goal:	ESBL infection. Complete course of antibiotics.

## 2019-01-04 NOTE — PROGRESS NOTE ADULT - PROBLEM SELECTOR PROBLEM 1
ESBL (extended spectrum beta-lactamase) producing bacteria infection
Sepsis due to Escherichia coli (E. coli)

## 2019-01-04 NOTE — PROGRESS NOTE ADULT - SUBJECTIVE AND OBJECTIVE BOX
ANIKET JOSÉ     Chief Complaint: Patient is a 58y old  Male who presents with a chief complaint of called back for bacteremia (04 Jan 2019 07:49)      PAST MEDICAL & SURGICAL HISTORY:  HLD (hyperlipidemia)  Enlarged prostate  No significant past surgical history      HPI/OVERNIGHT EVENTS: Patient has complaints of pain at francisco site. I spoke to the patient and the wife regarding prostatitis, and the need for midline and possible discharge home with home care. Add trial of pyridium.    MEDICATIONS  (STANDING):  docusate sodium 100 milliGRAM(s) Oral three times a day  enoxaparin Injectable 40 milliGRAM(s) SubCutaneous daily  ertapenem  IVPB 1000 milliGRAM(s) IV Intermittent every 24 hours  finasteride 5 milliGRAM(s) Oral daily  melatonin 3 milliGRAM(s) Oral at bedtime  phenazopyridine 200 milliGRAM(s) Oral three times a day  polyethylene glycol 3350 17 Gram(s) Oral daily  saccharomyces boulardii 250 milliGRAM(s) Oral two times a day  senna 2 Tablet(s) Oral at bedtime  tamsulosin 0.8 milliGRAM(s) Oral at bedtime      Vital Signs Last 24 Hrs  T(C): 36.5 (04 Jan 2019 00:38), Max: 36.6 (03 Jan 2019 15:51)  T(F): 97.7 (04 Jan 2019 00:38), Max: 97.8 (03 Jan 2019 15:51)  HR: 60 (04 Jan 2019 00:38) (60 - 63)  BP: 132/72 (04 Jan 2019 00:38) (132/72 - 134/78)  BP(mean): --  RR: 18 (04 Jan 2019 00:38) (18 - 18)  SpO2: 98% (04 Jan 2019 00:38) (98% - 99%)    PHYSICAL EXAM:  HEENT: PERRLA, EOMI, Normal Hearing  Neck: No LAD, No JVD  Back: No CVA tenderness  Respiratory: CTAB Cardiovascular: S1 and S2, RRR, no M/G/R  Gastrointestinal: BS+, soft, NT/ND  Extremities: No peripheral edema  Vascular: 2+ peripheral pulses  Neurological: A/O x 3, no focal deficits  Francisco in place        CAPILLARY BLOOD GLUCOSE    LABS:                RADIOLOGY & ADDITIONAL TESTS:

## 2019-01-04 NOTE — DISCHARGE NOTE ADULT - MEDICATION SUMMARY - MEDICATIONS TO TAKE
I will START or STAY ON the medications listed below when I get home from the hospital:    finasteride 5 mg oral tablet  -- 1 tab(s) by mouth once a day  -- Indication: For BPH    oxycodone-acetaminophen 5 mg-325 mg oral tablet  -- 1 tab(s) by mouth every 4 hours, As needed, Moderate Pain (4 - 6) MDD:3  -- Indication: For Pain    Flomax 0.4 mg oral capsule  -- 1 cap(s) by mouth once a day  -- Indication: For BPH    Ambien 5 mg oral tablet  -- 1 tab(s) by mouth once a day (at bedtime) MDD:1  -- Caution federal law prohibits the transfer of this drug to any person other  than the person for whom it was prescribed.  May cause drowsiness.  Alcohol may intensify this effect.  Use care when operating dangerous machinery.    -- Indication: For Insomnia    Breo Ellipta 100 mcg-25 mcg/inh inhalation powder  -- 1 puff(s) inhaled once a day  -- Indication: For Asthma    ertapenem 1 g injection  -- 1 gram(s) intravenously once a day MDD:Till 1/28/19  Will need weekly CBC and CMP faxed to [phone]  -- Indication: For Prostatitis    docusate sodium 100 mg oral capsule  -- 1 cap(s) by mouth 3 times a day  -- Indication: For Constipation    polyethylene glycol 3350 oral powder for reconstitution  -- 17 gram(s) by mouth once a day  -- Indication: For Constipation    senna oral tablet  -- 2 tab(s) by mouth once a day (at bedtime)  -- Indication: For Constipation    phenazopyridine 200 mg oral tablet  -- 1 tab(s) by mouth 3 times a day  -- Indication: For Pain    melatonin 3 mg oral tablet  -- 1 tab(s) by mouth once a day (at bedtime)  -- Indication: For Insomnia    saccharomyces boulardii lyo 250 mg oral capsule  -- 1 cap(s) by mouth 2 times a day  -- Indication: For Supplement

## 2019-01-04 NOTE — DISCHARGE NOTE ADULT - CARE PROVIDER_API CALL
Maximiliano Simon), Infectious Disease; Internal Medicine  [address] [place] [address]  Phone: [phone]  Fax: [phone]

## 2019-01-04 NOTE — PROGRESS NOTE ADULT - PROBLEM SELECTOR PROBLEM 3
Prostatitis
Prostatitis
Enlarged prostate
Prostatitis

## 2019-01-04 NOTE — PROGRESS NOTE ADULT - REASON FOR ADMISSION
called back for bacteremia

## 2019-01-04 NOTE — PROGRESS NOTE ADULT - PROBLEM SELECTOR PROBLEM 2
ESBL (extended spectrum beta-lactamase) producing bacteria infection
ESBL (extended spectrum beta-lactamase) producing bacteria infection
Enlarged prostate
Sepsis due to Escherichia coli (E. coli)
ESBL (extended spectrum beta-lactamase) producing bacteria infection

## 2019-01-04 NOTE — PROCEDURE NOTE - PROCEDURE
<<-----Click on this checkbox to enter Procedure Midline catheter insertion  01/04/2019    Active  [id number]

## 2019-01-04 NOTE — PROGRESS NOTE ADULT - PROBLEM SELECTOR PLAN 1
Continue meropenem thru 1/28. Midline ordered.  Add pyridium as well as percocet for pain at francisco site.

## 2019-01-04 NOTE — PROGRESS NOTE ADULT - SUBJECTIVE AND OBJECTIVE BOX
[facility]  INFECTIOUS DISEASES AND INTERNAL MEDICINE at [place]  =======================================================  Reji Hagen MD  Diplomates American Board of Internal Medicine and Infectious Diseases  =======================================================    ANIKET JOSÉ [id number]    Translation by     Follow up: ESBL E coli    No fevers or chills  No complaints  Reports feeling better      Allergies:  aspirin (Rash)      Antibiotics:  ertapenem  IVPB 1000 milliGRAM(s) IV Intermittent every 24 hours       REVIEW OF SYSTEMS:  CONSTITUTIONAL:  No Fever or chills  HEENT:   No diplopia or blurred vision.  No earache, sore throat or runny nose.  CARDIOVASCULAR:  No pressure, squeezing, strangling, tightness, heaviness or aching about the chest, neck, axilla or epigastrium.  RESPIRATORY:  No cough, shortness of breath  GASTROINTESTINAL:  No nausea, vomiting or diarrhea.  GENITOURINARY:  + Arreaga  MUSCULOSKELETAL:  no joint aches, no muscle pain  SKIN:  No change in skin, hair or nails.  NEUROLOGIC:  No paresthesias, fasciculations  PSYCHIATRIC:  No disorder of thought or mood.  ENDOCRINE:  No heat or cold intolerance  HEMATOLOGICAL:  No easy bruising or bleeding.       Physical Exam:  Vital Signs Last 24 Hrs  T(C): 36.5 (04 Jan 2019 00:38), Max: 36.6 (03 Jan 2019 15:51)  T(F): 97.7 (04 Jan 2019 00:38), Max: 97.8 (03 Jan 2019 15:51)  HR: 60 (04 Jan 2019 00:38) (60 - 63)  BP: 132/72 (04 Jan 2019 00:38) (132/72 - 134/78)  RR: 18 (04 Jan 2019 00:38) (18 - 18)  SpO2: 98% (04 Jan 2019 00:38) (98% - 99%)        GEN: NAD  HEENT: normocephalic and atraumatic. EOMI. PERRL.  Anicteric   NECK: Supple.   LUNGS: Clear to auscultation.  HEART: Regular rate and rhythm   ABDOMEN: Soft, nontender, and nondistended.  Positive bowel sounds.    : + Arreaga  EXTREMITIES: Without any edema.  MSK: no joint swelling  NEUROLOGIC:  No focal deficits  PSYCHIATRIC: Appropriate affect .  SKIN: No Rash      Labs:  01-02    136  |  102  |  10.0  ----------------------------<  108  4.2   |  24.0  |  0.63    Ca    8.2<L>      02 Jan 2019 08:00  Mg     2.1     01-02    TPro  6.1<L>  /  Alb  2.9<L>  /  TBili  <0.2<L>  /  DBili  x   /  AST  10  /  ALT  16  /  AlkPhos  77  01-02               10.9   9.2   )-----------( 197      ( 02 Jan 2019 08:00 )             33.7     LIVER FUNCTIONS - ( 02 Jan 2019 08:00 )  Alb: 2.9 g/dL / Pro: 6.1 g/dL / ALK PHOS: 77 U/L / ALT: 16 U/L / AST: 10 U/L / GGT: x             RECENT CULTURES:  01-01 @ 01:49 .Urine     No growth      12-31 @ 12:48 .Blood     No growth at 48 hours      12-30 @ 10:19 .Blood Escherichia coli ESBL  Blood Culture PCR    Growth in aerobic bottle: Escherichia coli ESBL  Aerobic Bottle: 11:18 Hours to positivity  Anaerobic Bottle: No growth to date  ***Blood Panel PCR results on this specimen are available  approximately 3 hours after the Gram stain result.***  Gram stain, PCR, and/or culture results may not always  correspond due to difference in methodologies.  ************************************************************  This PCR assay was performed using [employer].  The following targets are testedfor: Enterococcus,  vancomycin resistant enterococci, Listeria monocytogenes,  coagulase negative staphylococci, S. aureus,  methicillin resistant S. aureus, Streptococcus agalactiae  (Group B), S. pneumoniae, S. pyogenes (Group A),  Acinetobacter baumannii, Enterobacter cloacae, E. coli,  Klebsiella oxytoca, K. pneumoniae, Proteus sp.,  Serratia marcescens, Haemophilus influenzae,  Neisseria meningitidis, Pseudomonas aeruginosa, Candida  albicans, C. glabrata, C krusei, C parapsilosis,  C. tropicalis and the KPC resistance gene.  "Due to technical problems, Proteus sp. will Not be reported as part of  the BCID panel until further notice"      12-30 @ 08:04 .Urine Escherichia coli ESBL    >100,000 CFU/ml Escherichia coli ESBL            EXAM:  CT ABDOMEN AND PELVIS IC                        PROCEDURE DATE:  12/30/2018    INTERPRETATION:  Clinical information: Back pain. Evaluate for UTI,   prostatitis or pyelonephritis.  Comparison: None  PROCEDURE:   CT of the Abdomen and Pelvis was performed with   intravenous contrast.  100 ml of Omnipaque 350 was injected intravenously. None was discarded.  FINDINGS:  LOWER CHEST: There is subsegmental atelectasis at the lung bases.  ABDOMEN:  LIVER: within normal limits.  BILE DUCTS: normal caliber.  GALLBLADDER: No calcified gallstones. Normal caliber wall.  PANCREAS: within normal limits.  SPLEEN: within normal limits.  ADRENALS: within normal limits.  KIDNEYS: 2.1 cm right renal cyst and subcentimeter hypodensities in the   left kidney, too small to characterize.  PELVIS:  REPRODUCTIVE ORGANS: Prostate gland is enlarged to 5.3 cm.  URETERS: within normal limits.  BLADDER: There is a Arreaga catheter within the bladder.  BOWEL: There are scattered colonic diverticula. No enlarged mesenteric lymph nodes.  PERITONEUM: no ascites or free air, no fluid collection.  VESSELS: Atherosclerotic changes      RETROPERITONEUM: within normal limits.    ABDOMINAL WALL: within normal limits.  BONES: within normal limits.  IMPRESSION:   Arreaga catheter in bladder.  Prostate gland enlargement.

## 2019-01-04 NOTE — PROGRESS NOTE ADULT - PROBLEM SELECTOR PLAN 5
Likely secondary to prostatitis, follow up with urology as an outpatient.

## 2019-01-04 NOTE — DISCHARGE NOTE ADULT - HOSPITAL COURSE
58 year old male prostatis requiring francisco with esbl/uti. ID and urology input appreciated.  Patient is scheduled to remain on ertapanem thru 1/28. Markedly elevated PSA. 1/4 Patient scheduled for midline today.     Problem/Plan - 1:  ·  Problem: ESBL (extended spectrum beta-lactamase) producing bacteria infection.  Plan: Continue meropenem thru 1/28. Midline ordered.  Add pyridium as well as percocet for pain at francisco site.      Problem/Plan - 2:  ·  Problem: Sepsis due to Escherichia coli (E. coli).  Plan: Resolved. patient scheduled to continue antibiotics thru 1/28.      Problem/Plan - 3:  ·  Problem: Enlarged prostate.  Plan: Maintain francisco as per urology.      Problem/Plan - 4:  ·  Problem: Mixed hyperlipidemia.  Plan: Monitor lipid levels.      Problem/Plan - 5:  ·  Problem: Elevated PSA.  Plan: Likely secondary to prostatitis, follow up with urology as an outpatient.     Attending Attestation:   I was physically present for the key portions of the evaluation and management (E/M) service provided.  I agree with the above history, physical, and plan which I have reviewed and edited where appropriate.     49 minutes spent on total encounter; more than 50% of the visit was spent counseling and/or coordinating care by the attending physician.

## 2019-01-04 NOTE — PROGRESS NOTE ADULT - PROBLEM SELECTOR PLAN 2
Resolved
Resolved
Resolved. patient scheduled to continue antibiotics thru 1/28.
Resolved. patient scheduled to continue antibiotics thru 1/28.
see above

## 2019-01-04 NOTE — PROGRESS NOTE ADULT - SUBJECTIVE AND OBJECTIVE BOX
ANIKET JOSÉ     Chief Complaint: Patient is a 58y old  Male who presents with a chief complaint of called back for bacteremia (04 Jan 2019 15:35)      PAST MEDICAL & SURGICAL HISTORY:  HLD (hyperlipidemia)  Enlarged prostate  No significant past surgical history      HPI/OVERNIGHT EVENTS: Patient in no distress. Stable for discharge home with home care. I spoke to the patient and his wife with an .    MEDICATIONS  (STANDING):  docusate sodium 100 milliGRAM(s) Oral three times a day  enoxaparin Injectable 40 milliGRAM(s) SubCutaneous daily  ertapenem  IVPB 1000 milliGRAM(s) IV Intermittent every 24 hours  finasteride 5 milliGRAM(s) Oral daily  melatonin 3 milliGRAM(s) Oral at bedtime  phenazopyridine 200 milliGRAM(s) Oral three times a day  polyethylene glycol 3350 17 Gram(s) Oral daily  saccharomyces boulardii 250 milliGRAM(s) Oral two times a day  senna 2 Tablet(s) Oral at bedtime  tamsulosin 0.8 milliGRAM(s) Oral at bedtime      Vital Signs Last 24 Hrs  T(C): 36.9 (04 Jan 2019 15:48), Max: 36.9 (04 Jan 2019 07:34)  T(F): 98.5 (04 Jan 2019 15:48), Max: 98.5 (04 Jan 2019 07:34)  HR: 76 (04 Jan 2019 15:48) (60 - 76)  BP: 120/75 (04 Jan 2019 15:48) (120/75 - 132/72)  BP(mean): --  RR: 18 (04 Jan 2019 15:48) (16 - 18)  SpO2: 96% (04 Jan 2019 15:48) (94% - 98%)    PHYSICAL EXAM:  HEENT: PERRLA, EOMI, Normal Hearing  Neck: No LAD, No JVD  Back: No CVA tenderness  Respiratory: CTAB Cardiovascular: S1 and S2, RRR, no M/G/R  Gastrointestinal: BS+, soft, NT/ND  Extremities: No peripheral edema  Vascular: 2+ peripheral pulses  Neurological: A/O x 3, no focal deficits  Arreaga and Midline in place        CAPILLARY BLOOD GLUCOSE    LABS:                RADIOLOGY & ADDITIONAL TESTS:

## 2019-01-04 NOTE — DISCHARGE NOTE ADULT - PATIENT PORTAL LINK FT
You can access the [facility] Patient Portal, offered by [facility], by registering with the following website: [email or web address]

## 2019-01-04 NOTE — PROGRESS NOTE ADULT - PROVIDER SPECIALTY LIST ADULT
Hospitalist
Infectious Disease
Internal Medicine
Urology
Infectious Disease

## 2019-01-04 NOTE — DISCHARGE NOTE ADULT - PLAN OF CARE
Resolve infection Complete course of antibiotics and follow up with Infectious Disease Follow up with urology Follow up with Urology Target LDL less than 100 ESBL infection. Complete course of antibiotics.

## 2019-01-04 NOTE — PROGRESS NOTE ADULT - ASSESSMENT
58 year old male prostatis requiring francisco with esbl/uti. ID and urology input appreciated.  Patient is scheduled to remain on ertapanem thru 1/28. Markedly elevated PSA. 1/4 Patient scheduled for midline today.

## 2019-01-05 LAB
CULTURE RESULTS: SIGNIFICANT CHANGE UP
CULTURE RESULTS: SIGNIFICANT CHANGE UP
SPECIMEN SOURCE: SIGNIFICANT CHANGE UP
SPECIMEN SOURCE: SIGNIFICANT CHANGE UP

## 2019-01-07 ENCOUNTER — CHART COPY (OUTPATIENT)
Age: 59
End: 2019-01-07

## 2019-01-07 PROBLEM — N40.0 BENIGN PROSTATIC HYPERPLASIA WITHOUT LOWER URINARY TRACT SYMPTOMS: Chronic | Status: ACTIVE | Noted: 2018-12-30

## 2019-01-07 PROBLEM — E78.5 HYPERLIPIDEMIA, UNSPECIFIED: Chronic | Status: ACTIVE | Noted: 2018-12-31

## 2019-01-09 ENCOUNTER — APPOINTMENT (OUTPATIENT)
Dept: UROLOGY | Facility: CLINIC | Age: 59
End: 2019-01-09
Payer: COMMERCIAL

## 2019-01-09 ENCOUNTER — APPOINTMENT (OUTPATIENT)
Dept: INTERNAL MEDICINE | Facility: CLINIC | Age: 59
End: 2019-01-09
Payer: COMMERCIAL

## 2019-01-09 VITALS
HEART RATE: 68 BPM | BODY MASS INDEX: 25.61 KG/M2 | HEIGHT: 64 IN | DIASTOLIC BLOOD PRESSURE: 78 MMHG | SYSTOLIC BLOOD PRESSURE: 116 MMHG | WEIGHT: 150 LBS | RESPIRATION RATE: 12 BRPM

## 2019-01-09 PROCEDURE — 99496 TRANSJ CARE MGMT HIGH F2F 7D: CPT

## 2019-01-09 PROCEDURE — 99214 OFFICE O/P EST MOD 30 MIN: CPT

## 2019-01-09 NOTE — PHYSICAL EXAM
[General Appearance - Well Developed] : well developed [Normal Appearance] : normal appearance [General Appearance - In No Acute Distress] : no acute distress [Abdomen Soft] : soft [Abdomen Tenderness] : non-tender [] : no respiratory distress [Oriented To Time, Place, And Person] : oriented to person, place, and time [Normal Station and Gait] : the gait and station were normal for the patient's age [FreeTextEntry1] : normal peripheral circulation

## 2019-01-09 NOTE — ASSESSMENT
[FreeTextEntry1] : Benign Prostatic Hyperplasia:\par Prostatitis:\par Urinary retention:\par ESBL UTI:\par Discussed treatment options. Pt will like to proceed with Greenlight laser vaporization of Prostate. \par Continue IV Antibiotics per ID. \par Continue indwelling Arreaga catheter. \par Continue Flomax BID and Finasteride. \par Will discuss with ID and try and do Urodynamics on 1/18/19 and Greenlight laser vaporization of Prostate subsequently.

## 2019-01-09 NOTE — HISTORY OF PRESENT ILLNESS
[FreeTextEntry1] : 57 yo male presents for follow up.\par Pt primarily Argentine speaking with limited English visit conducted with help of accompanying son in law. \par On 12/30 went to [facility] with difficulty urinating, suprapubic pain and dysuria. \par Currently on IV Antibiotics for Prostatitis and ESBL UTI. Also has indwelling Arreaga catheter for Urinary retention. \par Taking Flomax- BID and Finasteride. \par \par Has Erectile dysfunction. Tried Viagra in the past and had whole body turn warm. Did not like the feeling. \par \par Initially seen for painful urination. \par Reported slow stream, urinates every 2 hours or so during the day and nocturia of 3-4 x. \par Endorsed hesitancy and straining. Was on Flomax- increased to 2 caps. \par Mentioned that 3 years ago had surgery through penis for difficulty urinating. \par \par Rates erections as 2/5. Reports normal libido. \par \par \par

## 2019-01-09 NOTE — HISTORY OF PRESENT ILLNESS
[Post-hospitalization from ___ Hospital] : Post-hospitalization from [unfilled] Hospital [Admitted on: ___] : The patient was admitted on [unfilled] [Discharged on ___] : discharged on [unfilled] [Patient Contacted By: ____] : and contacted by [unfilled] [Discharge Summary] : discharge summary [FreeTextEntry2] : patient here for follow up of hospitalzaiton\par was in hosptial with sepsis bacteremia from prostate infection and urinary retention\par grew esbl\par is on invanz at home \par wife gives abx via picc\par has hemorrhoids

## 2019-01-09 NOTE — REASON FOR VISIT
[Follow-up Visit ___] : a follow-up visit  for [unfilled] [Family Member] : family member [Other: _____] : [unfilled]

## 2019-01-09 NOTE — PHYSICAL EXAM

## 2019-01-23 ENCOUNTER — APPOINTMENT (OUTPATIENT)
Dept: INTERNAL MEDICINE | Facility: CLINIC | Age: 59
End: 2019-01-23
Payer: COMMERCIAL

## 2019-01-23 VITALS
HEART RATE: 70 BPM | WEIGHT: 151 LBS | DIASTOLIC BLOOD PRESSURE: 78 MMHG | SYSTOLIC BLOOD PRESSURE: 114 MMHG | BODY MASS INDEX: 25.78 KG/M2 | HEIGHT: 64 IN | RESPIRATION RATE: 14 BRPM

## 2019-01-23 DIAGNOSIS — R05 COUGH: ICD-10-CM

## 2019-01-23 PROCEDURE — 99214 OFFICE O/P EST MOD 30 MIN: CPT

## 2019-01-23 NOTE — HISTORY OF PRESENT ILLNESS
[FreeTextEntry1] : follow up bph with obtruction has francisco [de-identified] : has been at home recovering\par completing invanz on Jan 28\par patient has no new issues to report\par PICC line removed secondary to not functioning\par has left iv\par he has no fever no sob no nvd or palpitatons

## 2019-01-23 NOTE — ASSESSMENT
[FreeTextEntry1] : urology and ID follow up\par not cleared for work until issues resolved\par cough tessalon\par bp stable\par follow up mid Feb

## 2019-01-28 ENCOUNTER — APPOINTMENT (OUTPATIENT)
Age: 59
End: 2019-01-28
Payer: COMMERCIAL

## 2019-01-28 ENCOUNTER — MEDICATION RENEWAL (OUTPATIENT)
Age: 59
End: 2019-01-28

## 2019-01-28 VITALS
HEIGHT: 64 IN | SYSTOLIC BLOOD PRESSURE: 138 MMHG | BODY MASS INDEX: 26.8 KG/M2 | HEART RATE: 91 BPM | WEIGHT: 157 LBS | OXYGEN SATURATION: 97 % | DIASTOLIC BLOOD PRESSURE: 83 MMHG

## 2019-01-28 PROCEDURE — 99214 OFFICE O/P EST MOD 30 MIN: CPT | Mod: 25

## 2019-01-28 PROCEDURE — 51700 IRRIGATION OF BLADDER: CPT

## 2019-01-28 NOTE — ASSESSMENT
[FreeTextEntry1] : Benign Prostatic Hyperplasia:\par Urinary retention:\par Fill and Pull:\par After irrigating the catheter instilled 200 ml of SW into the bladder, removed Arreaga, Pt urinated 200 ml of clear fluid with good stream. \par Continue Flomax (2 caps) and Finasteride. \par Pt wants to proceed with Surgery. \par \par ESBL UTI:\par Recommended repeat Urine test 1 week after completion of Antibiotics course. \par Seeing ID tomorrow. \par \par Elevated PSA:\par Will need repeat PSA in 6 weeks. \par \par Will follow ID plan and call pateint. \par

## 2019-01-28 NOTE — HISTORY OF PRESENT ILLNESS
[FreeTextEntry1] : 59 yo male presents complaining of suprapubic pain.\par Pt primarily Tuvaluan speaking with limited English visit conducted with help of accompanying Daughter.\par Mentions that for last few days has been having suprapubic pain, testicle tenderness. \par Arreaga to leg bag draining clear urine. Today last day for IV Antibiotics. \par Denies fever, chills or rigors.  \par Taking Flomax- BID and Finasteride. \par Also had Elevated PSA at [facility]. \par \par Recently seen after going to [facility] with difficulty urinating, suprapubic pain and dysuria. \par On IV Antibiotics for Prostatitis and ESBL UTI. Also had indwelling Arreaga catheter for Urinary retention. \par Taking Flomax- BID and Finasteride. \par \par Has Erectile dysfunction. Tried Viagra in the past and had whole body turn warm. Did not like the feeling. \par \par Initially seen for painful urination. \par Reported slow stream, urinates every 2 hours or so during the day and nocturia of 3-4 x. \par Endorsed hesitancy and straining. Was on Flomax- increased to 2 caps. \par Mentioned that 3 years ago had surgery through penis for difficulty urinating. \par \par Rates erections as 2/5. Reports normal libido. \par \par \par

## 2019-01-28 NOTE — PHYSICAL EXAM
[General Appearance - In No Acute Distress] : no acute distress [Abdomen Soft] : soft [Suprapubic] : in the suprapubic area [FreeTextEntry1] : Arreaga to leg bag draining clear urine, bilateral testis slightly tender to touch  [] : no respiratory distress [Oriented To Time, Place, And Person] : oriented to person, place, and time [Normal Station and Gait] : the gait and station were normal for the patient's age

## 2019-01-29 ENCOUNTER — APPOINTMENT (OUTPATIENT)
Dept: INTERNAL MEDICINE | Facility: CLINIC | Age: 59
End: 2019-01-29
Payer: COMMERCIAL

## 2019-01-29 VITALS
WEIGHT: 157 LBS | SYSTOLIC BLOOD PRESSURE: 120 MMHG | HEIGHT: 64 IN | BODY MASS INDEX: 26.8 KG/M2 | DIASTOLIC BLOOD PRESSURE: 80 MMHG

## 2019-01-29 DIAGNOSIS — N39.0 URINARY TRACT INFECTION, SITE NOT SPECIFIED: ICD-10-CM

## 2019-01-29 DIAGNOSIS — N41.9 INFLAMMATORY DISEASE OF PROSTATE, UNSPECIFIED: ICD-10-CM

## 2019-01-29 DIAGNOSIS — Z16.12 URINARY TRACT INFECTION, SITE NOT SPECIFIED: ICD-10-CM

## 2019-01-29 DIAGNOSIS — B96.29 URINARY TRACT INFECTION, SITE NOT SPECIFIED: ICD-10-CM

## 2019-01-29 PROCEDURE — 99214 OFFICE O/P EST MOD 30 MIN: CPT

## 2019-01-29 NOTE — REASON FOR VISIT
[Follow-Up: _____] : a [unfilled] follow-up visit [Patient Declined  Services] : - None: Patient declined  services [FreeTextEntry1] : rubi [FreeTextEntry3] : Cherise GREEN present for interpretation

## 2019-01-29 NOTE — ASSESSMENT
[FreeTextEntry1] : 58-year-old male here for followup for ESBL Escherichia coli prostatitis\par \par ESBL Escherichia coli infection\par prostatitis\par Urinary retention\par \par \par Patient completed 4 weeks of IV Ertapenem\par No need for further IV antibiotics at this time\par Can Proceed with urological procedure\par Labs reviewed\par No further laboratory  test required at this time\par \par Follow up as needed\par \par Counseling included lab results, differential diagnosis, treatment options, risks and benefits, lifestyle changes, current condition, medications and dose adjustments.\par The patient was interactive attentive and asked questions and verbalized understanding.\par

## 2019-01-29 NOTE — HISTORY OF PRESENT ILLNESS
[FreeTextEntry1] : 58-year-old male with history of BPH was admitted to the hospital with Escherichia coli septicemia and her source was likely prostatitis. PSA was elevated. Patient had symptoms of urinary retention. Culture grew out with ESBL Escherichia coli. Patient was treated with IV Ertapenem. He completed 4 weeks of IV antibiotics on 1/28/19. Presents here for followup. reports no complaints. Reports feeling better.

## 2019-01-29 NOTE — PHYSICAL EXAM
[General Appearance - Alert] : alert [General Appearance - In No Acute Distress] : in no acute distress [General Appearance - Well Nourished] : well nourished [Sclera] : the sclera and conjunctiva were normal [PERRL With Normal Accommodation] : pupils were equal in size, round, reactive to light [Extraocular Movements] : extraocular movements were intact [Outer Ear] : the ears and nose were normal in appearance [Neck Appearance] : the appearance of the neck was normal [Respiration, Rhythm And Depth] : normal respiratory rhythm and effort [Exaggerated Use Of Accessory Muscles For Inspiration] : no accessory muscle use [Auscultation Breath Sounds / Voice Sounds] : lungs were clear to auscultation bilaterally [Heart Rate And Rhythm] : heart rate was normal and rhythm regular [Heart Sounds] : normal S1 and S2 [Full Pulse] : the pedal pulses are present [Edema] : there was no peripheral edema [Bowel Sounds] : normal bowel sounds [Abdomen Soft] : soft [Abdomen Tenderness] : non-tender [Costovertebral Angle Tenderness] : no CVA tenderness [Musculoskeletal - Swelling] : no joint swelling [Motor Tone] : muscle strength and tone were normal [Skin Color & Pigmentation] : normal skin color and pigmentation [] : no rash [Motor Exam] : the motor exam was normal [No Focal Deficits] : no focal deficits [Oriented To Time, Place, And Person] : oriented to person, place, and time [Affect] : the affect was normal

## 2019-02-05 LAB
APPEARANCE: CLEAR
BACTERIA: NEGATIVE
BILIRUBIN URINE: NEGATIVE
BLOOD URINE: NEGATIVE
COLOR: YELLOW
GLUCOSE QUALITATIVE U: NEGATIVE MG/DL
HYALINE CASTS: 0 /LPF
KETONES URINE: NEGATIVE
LEUKOCYTE ESTERASE URINE: NEGATIVE
MICROSCOPIC-UA: NORMAL
NITRITE URINE: NEGATIVE
PH URINE: 7
PROTEIN URINE: NEGATIVE MG/DL
RED BLOOD CELLS URINE: 1 /HPF
SPECIFIC GRAVITY URINE: 1.02
SQUAMOUS EPITHELIAL CELLS: 0 /HPF
UROBILINOGEN URINE: NEGATIVE MG/DL
WHITE BLOOD CELLS URINE: 0 /HPF

## 2019-02-06 LAB — BACTERIA UR CULT: NORMAL

## 2019-02-08 ENCOUNTER — APPOINTMENT (OUTPATIENT)
Dept: UROLOGY | Facility: CLINIC | Age: 59
End: 2019-02-08
Payer: COMMERCIAL

## 2019-02-08 VITALS — OXYGEN SATURATION: 96 % | HEART RATE: 78 BPM | DIASTOLIC BLOOD PRESSURE: 72 MMHG | SYSTOLIC BLOOD PRESSURE: 121 MMHG

## 2019-02-08 PROCEDURE — 51784 ANAL/URINARY MUSCLE STUDY: CPT

## 2019-02-08 PROCEDURE — 51797 INTRAABDOMINAL PRESSURE TEST: CPT

## 2019-02-08 PROCEDURE — 51741 ELECTRO-UROFLOWMETRY FIRST: CPT

## 2019-02-08 PROCEDURE — 51728 CYSTOMETROGRAM W/VP: CPT

## 2019-02-12 ENCOUNTER — APPOINTMENT (OUTPATIENT)
Dept: INTERNAL MEDICINE | Facility: CLINIC | Age: 59
End: 2019-02-12
Payer: COMMERCIAL

## 2019-02-12 VITALS
HEART RATE: 82 BPM | DIASTOLIC BLOOD PRESSURE: 78 MMHG | SYSTOLIC BLOOD PRESSURE: 128 MMHG | BODY MASS INDEX: 26.98 KG/M2 | HEIGHT: 64 IN | RESPIRATION RATE: 12 BRPM | WEIGHT: 158 LBS

## 2019-02-12 DIAGNOSIS — Z16.12 BACTERIAL INFECTION, UNSPECIFIED: ICD-10-CM

## 2019-02-12 DIAGNOSIS — A49.9 BACTERIAL INFECTION, UNSPECIFIED: ICD-10-CM

## 2019-02-12 DIAGNOSIS — R33.9 RETENTION OF URINE, UNSPECIFIED: ICD-10-CM

## 2019-02-12 LAB
GLUCOSE BLDC GLUCOMTR-MCNC: 121
HBA1C MFR BLD HPLC: 5.7

## 2019-02-12 PROCEDURE — 82962 GLUCOSE BLOOD TEST: CPT

## 2019-02-12 PROCEDURE — 99214 OFFICE O/P EST MOD 30 MIN: CPT

## 2019-02-12 PROCEDURE — 83036 HEMOGLOBIN GLYCOSYLATED A1C: CPT | Mod: QW

## 2019-02-12 RX ORDER — BENZONATATE 200 MG/1
200 CAPSULE ORAL 3 TIMES DAILY
Qty: 15 | Refills: 1 | Status: DISCONTINUED | COMMUNITY
Start: 2019-01-23 | End: 2019-02-12

## 2019-02-12 NOTE — HISTORY OF PRESENT ILLNESS
[FreeTextEntry1] : follow urinary retention [de-identified] : urinary retention resolved\par will need prostate surgery, present with three options but he prefers the more common turp procedure\par he has been controlling urine flow ok\par tiffanitent has no chest pain sob nvd\par he has no urinary symptoms\par he has no blood in the urine \par he feels ok

## 2019-03-07 ENCOUNTER — OUTPATIENT (OUTPATIENT)
Dept: OUTPATIENT SERVICES | Facility: HOSPITAL | Age: 59
LOS: 1 days | Discharge: HOME | End: 2019-03-07
Payer: COMMERCIAL

## 2019-03-07 VITALS
WEIGHT: 158.29 LBS | HEIGHT: 60 IN | RESPIRATION RATE: 16 BRPM | TEMPERATURE: 98 F | OXYGEN SATURATION: 98 % | DIASTOLIC BLOOD PRESSURE: 78 MMHG | SYSTOLIC BLOOD PRESSURE: 146 MMHG | HEART RATE: 88 BPM

## 2019-03-07 LAB
ABO RH CONFIRMATION: SIGNIFICANT CHANGE UP
ANION GAP SERPL CALC-SCNC: 7 MMOL/L — SIGNIFICANT CHANGE UP (ref 5–17)
APPEARANCE UR: CLEAR — SIGNIFICANT CHANGE UP
APTT BLD: 33.7 SEC — SIGNIFICANT CHANGE UP (ref 27.5–36.3)
BACTERIA # UR AUTO: ABNORMAL
BASOPHILS # BLD AUTO: 0.03 K/UL — SIGNIFICANT CHANGE UP (ref 0–0.2)
BASOPHILS NFR BLD AUTO: 0.6 % — SIGNIFICANT CHANGE UP (ref 0–2)
BILIRUB UR-MCNC: NEGATIVE — SIGNIFICANT CHANGE UP
BLD GP AB SCN SERPL QL: SIGNIFICANT CHANGE UP
BUN SERPL-MCNC: 15 MG/DL — SIGNIFICANT CHANGE UP (ref 7–23)
CALCIUM SERPL-MCNC: 8.4 MG/DL — LOW (ref 8.5–10.1)
CHLORIDE SERPL-SCNC: 108 MMOL/L — SIGNIFICANT CHANGE UP (ref 96–108)
CO2 SERPL-SCNC: 28 MMOL/L — SIGNIFICANT CHANGE UP (ref 22–31)
COLOR SPEC: YELLOW — SIGNIFICANT CHANGE UP
COMMENT - URINE: SIGNIFICANT CHANGE UP
CREAT SERPL-MCNC: 1.06 MG/DL — SIGNIFICANT CHANGE UP (ref 0.5–1.3)
DIFF PNL FLD: NEGATIVE — SIGNIFICANT CHANGE UP
EOSINOPHIL # BLD AUTO: 0.14 K/UL — SIGNIFICANT CHANGE UP (ref 0–0.5)
EOSINOPHIL NFR BLD AUTO: 2.6 % — SIGNIFICANT CHANGE UP (ref 0–6)
GLUCOSE SERPL-MCNC: 117 MG/DL — HIGH (ref 70–99)
GLUCOSE UR QL: NEGATIVE MG/DL — SIGNIFICANT CHANGE UP
HCT VFR BLD CALC: 41.7 % — SIGNIFICANT CHANGE UP (ref 39–50)
HGB BLD-MCNC: 13.5 G/DL — SIGNIFICANT CHANGE UP (ref 13–17)
IMM GRANULOCYTES NFR BLD AUTO: 0.2 % — SIGNIFICANT CHANGE UP (ref 0–1.5)
INR BLD: 1.02 RATIO — SIGNIFICANT CHANGE UP (ref 0.88–1.16)
KETONES UR-MCNC: NEGATIVE — SIGNIFICANT CHANGE UP
LEUKOCYTE ESTERASE UR-ACNC: ABNORMAL
LYMPHOCYTES # BLD AUTO: 2.6 K/UL — SIGNIFICANT CHANGE UP (ref 1–3.3)
LYMPHOCYTES # BLD AUTO: 48.4 % — HIGH (ref 13–44)
MCHC RBC-ENTMCNC: 29.6 PG — SIGNIFICANT CHANGE UP (ref 27–34)
MCHC RBC-ENTMCNC: 32.4 GM/DL — SIGNIFICANT CHANGE UP (ref 32–36)
MCV RBC AUTO: 91.4 FL — SIGNIFICANT CHANGE UP (ref 80–100)
MONOCYTES # BLD AUTO: 0.5 K/UL — SIGNIFICANT CHANGE UP (ref 0–0.9)
MONOCYTES NFR BLD AUTO: 9.3 % — SIGNIFICANT CHANGE UP (ref 2–14)
NEUTROPHILS # BLD AUTO: 2.09 K/UL — SIGNIFICANT CHANGE UP (ref 1.8–7.4)
NEUTROPHILS NFR BLD AUTO: 38.9 % — LOW (ref 43–77)
NITRITE UR-MCNC: NEGATIVE — SIGNIFICANT CHANGE UP
NRBC # BLD: 0 /100 WBCS — SIGNIFICANT CHANGE UP (ref 0–0)
PH UR: 7 — SIGNIFICANT CHANGE UP (ref 5–8)
PLATELET # BLD AUTO: 242 K/UL — SIGNIFICANT CHANGE UP (ref 150–400)
POTASSIUM SERPL-MCNC: 4.2 MMOL/L — SIGNIFICANT CHANGE UP (ref 3.5–5.3)
POTASSIUM SERPL-SCNC: 4.2 MMOL/L — SIGNIFICANT CHANGE UP (ref 3.5–5.3)
PROT UR-MCNC: NEGATIVE MG/DL — SIGNIFICANT CHANGE UP
PROTHROM AB SERPL-ACNC: 11.3 SEC — SIGNIFICANT CHANGE UP (ref 10–12.9)
RBC # BLD: 4.56 M/UL — SIGNIFICANT CHANGE UP (ref 4.2–5.8)
RBC # FLD: 13.9 % — SIGNIFICANT CHANGE UP (ref 10.3–14.5)
RBC CASTS # UR COMP ASSIST: NEGATIVE /HPF — SIGNIFICANT CHANGE UP (ref 0–4)
SODIUM SERPL-SCNC: 143 MMOL/L — SIGNIFICANT CHANGE UP (ref 135–145)
SP GR SPEC: 1.01 — SIGNIFICANT CHANGE UP (ref 1.01–1.02)
TYPE + AB SCN PNL BLD: SIGNIFICANT CHANGE UP
UROBILINOGEN FLD QL: NEGATIVE MG/DL — SIGNIFICANT CHANGE UP
WBC # BLD: 5.37 K/UL — SIGNIFICANT CHANGE UP (ref 3.8–10.5)
WBC # FLD AUTO: 5.37 K/UL — SIGNIFICANT CHANGE UP (ref 3.8–10.5)
WBC UR QL: SIGNIFICANT CHANGE UP

## 2019-03-07 PROCEDURE — 71046 X-RAY EXAM CHEST 2 VIEWS: CPT | Mod: 26

## 2019-03-07 PROCEDURE — 93010 ELECTROCARDIOGRAM REPORT: CPT

## 2019-03-07 NOTE — H&P PST ADULT - HISTORY OF PRESENT ILLNESS
This is a 60 y/o Serbian male with daughter at side to interprete. Pt has a PMH of BPH who is C/O of nocturia, hesitancy and frequency. Denies any hematuria or dysuria. He is now scheduled for a transurethral resection of prostate.  Denies any fevers, chills, SOB or chest pain. This is a 58 y/o Swedish male with daughter (Meenu Pope) at side to interpret.  As per [place]  (Renzo [id number]) patient wishes his daughter to interpret for him. Pt has a PMH of BPH and has been C/O of nocturia, hesitancy and frequency. Denies any hematuria, dysuria, fevers, chills, SOB or chest pain. He is now scheduled for a transurethral resection of prostate.

## 2019-03-07 NOTE — H&P PST ADULT - ASSESSMENT
This is a 60 y/o Swedish male with nocturia, frequency and hestincy who is schedule for a TURP    Patient instructed on     1. NPO post midnight of surgery  2. Aware that he needs medical clearance (has an appointment with Reji Granados MD on 3/8/19) This is a 58 y/o [place] male with nocturia, frequency and hestincy who is schedule for a transurethral resection of prostate    Patient and daughter (Meenu) instructed on     1. NPO post midnight of surgery  2. Aware that he needs medical clearance (has an appointment with Reji Granados MD on 3/8/19)

## 2019-03-07 NOTE — H&P PST ADULT - LANGUAGE ASSISTANCE NEEDED
[place]  [id number] Renzo reports pt wishes his daughter to be his ./No-Patient/Caregiver offered and refused free interpretation services.

## 2019-03-08 ENCOUNTER — RECORD ABSTRACTING (OUTPATIENT)
Age: 59
End: 2019-03-08

## 2019-03-08 ENCOUNTER — APPOINTMENT (OUTPATIENT)
Dept: INTERNAL MEDICINE | Facility: CLINIC | Age: 59
End: 2019-03-08
Payer: COMMERCIAL

## 2019-03-08 ENCOUNTER — NON-APPOINTMENT (OUTPATIENT)
Age: 59
End: 2019-03-08

## 2019-03-08 VITALS
SYSTOLIC BLOOD PRESSURE: 122 MMHG | WEIGHT: 158 LBS | HEIGHT: 64 IN | BODY MASS INDEX: 26.98 KG/M2 | DIASTOLIC BLOOD PRESSURE: 64 MMHG | HEART RATE: 68 BPM | RESPIRATION RATE: 15 BRPM

## 2019-03-08 LAB
CULTURE RESULTS: NO GROWTH — SIGNIFICANT CHANGE UP
SPECIMEN SOURCE: SIGNIFICANT CHANGE UP

## 2019-03-08 PROCEDURE — 93000 ELECTROCARDIOGRAM COMPLETE: CPT

## 2019-03-08 PROCEDURE — 99214 OFFICE O/P EST MOD 30 MIN: CPT | Mod: 25

## 2019-03-08 RX ORDER — CIPROFLOXACIN HYDROCHLORIDE 500 MG/1
500 TABLET, FILM COATED ORAL TWICE DAILY
Qty: 28 | Refills: 0 | Status: DISCONTINUED | COMMUNITY
Start: 2018-10-18 | End: 2019-03-08

## 2019-03-08 RX ORDER — HYDROCORTISONE ACETATE 25 MG/1
25 SUPPOSITORY RECTAL TWICE DAILY
Qty: 1 | Refills: 1 | Status: DISCONTINUED | COMMUNITY
Start: 2019-01-09 | End: 2019-03-08

## 2019-03-08 RX ORDER — IBUPROFEN 800 MG/1
800 TABLET, FILM COATED ORAL 3 TIMES DAILY
Qty: 45 | Refills: 1 | Status: DISCONTINUED | COMMUNITY
Start: 1900-01-01 | End: 2019-03-08

## 2019-03-08 RX ORDER — CIPROFLOXACIN HYDROCHLORIDE 500 MG/1
500 TABLET, FILM COATED ORAL TWICE DAILY
Qty: 6 | Refills: 0 | Status: DISCONTINUED | COMMUNITY
Start: 2019-02-08 | End: 2019-03-08

## 2019-03-08 RX ORDER — MONTELUKAST 10 MG/1
10 TABLET, FILM COATED ORAL DAILY
Qty: 1 | Refills: 4 | Status: DISCONTINUED | COMMUNITY
Start: 2017-10-13 | End: 2019-03-08

## 2019-03-08 NOTE — HISTORY OF PRESENT ILLNESS
[No Pertinent Cardiac History] : no history of aortic stenosis, atrial fibrillation, coronary artery disease, recent myocardial infarction, or implantable device/pacemaker [Asthma] : asthma [No Adverse Anesthesia Reaction] : no adverse anesthesia reaction in self or family member [Aortic Stenosis] : no aortic stenosis [Atrial Fibrillation] : no atrial fibrillation [Coronary Artery Disease] : no coronary artery disease [Recent Myocardial Infarction] : no recent myocardial infarction [COPD] : no COPD [Sleep Apnea] : no sleep apnea [Family Member] : no family member with adverse anesthesia reaction/sudden death [Self] : no previous adverse anesthesia reaction [Chronic Anticoagulation] : no chronic anticoagulation [Chronic Kidney Disease] : no chronic kidney disease [Diabetes] : no diabetes [FreeTextEntry1] : TURP [FreeTextEntry2] : 3/12/19 [FreeTextEntry3] : Dr. Carrera [FreeTextEntry4] : Patient 59 year old with history of bph, recent urinary retention with bacteremia (completed course of IV abx), who will undergo TURP.\par Patient has a history of asthma which has been stable.  He denies chest pain sob nvd and feels at baseline

## 2019-03-08 NOTE — ASSESSMENT
[Patient Optimized for Surgery] : Patient optimized for surgery [No Further Testing Recommended] : no further testing recommended [FreeTextEntry4] : Patient is medically stable for planned procedure.  He has recovered from bacteremia secondary to bladder outlet obstruction. [FreeTextEntry7] : no nsaids, no aspirin, use inhalers as prescribed

## 2019-03-08 NOTE — RESULTS/DATA
[] : results reviewed [de-identified] : normal [de-identified] : normal [de-identified] : napd [de-identified] : nsr no acute changes

## 2019-03-12 ENCOUNTER — OUTPATIENT (OUTPATIENT)
Dept: OUTPATIENT SERVICES | Facility: HOSPITAL | Age: 59
LOS: 1 days | Discharge: HOME | End: 2019-03-12
Payer: COMMERCIAL

## 2019-03-12 ENCOUNTER — OTHER (OUTPATIENT)
Age: 59
End: 2019-03-12

## 2019-03-12 ENCOUNTER — RESULT REVIEW (OUTPATIENT)
Age: 59
End: 2019-03-12

## 2019-03-12 ENCOUNTER — APPOINTMENT (OUTPATIENT)
Age: 59
End: 2019-03-12

## 2019-03-12 VITALS
WEIGHT: 158.29 LBS | TEMPERATURE: 98 F | HEART RATE: 88 BPM | DIASTOLIC BLOOD PRESSURE: 87 MMHG | RESPIRATION RATE: 16 BRPM | HEIGHT: 60 IN | OXYGEN SATURATION: 100 % | SYSTOLIC BLOOD PRESSURE: 127 MMHG

## 2019-03-12 VITALS
TEMPERATURE: 97 F | SYSTOLIC BLOOD PRESSURE: 139 MMHG | OXYGEN SATURATION: 98 % | HEART RATE: 85 BPM | DIASTOLIC BLOOD PRESSURE: 70 MMHG | RESPIRATION RATE: 14 BRPM

## 2019-03-12 PROCEDURE — 52601 PROSTATECTOMY (TURP): CPT

## 2019-03-12 PROCEDURE — 88305 TISSUE EXAM BY PATHOLOGIST: CPT | Mod: 26

## 2019-03-12 RX ORDER — PHENAZOPYRIDINE HCL 100 MG
200 TABLET ORAL ONCE
Qty: 0 | Refills: 0 | Status: COMPLETED | OUTPATIENT
Start: 2019-03-12 | End: 2019-03-12

## 2019-03-12 RX ORDER — FAMOTIDINE 10 MG/ML
20 INJECTION INTRAVENOUS ONCE
Qty: 0 | Refills: 0 | Status: COMPLETED | OUTPATIENT
Start: 2019-03-12 | End: 2019-03-12

## 2019-03-12 RX ORDER — CEFUROXIME AXETIL 250 MG
1 TABLET ORAL
Qty: 10 | Refills: 0
Start: 2019-03-12 | End: 2019-03-16

## 2019-03-12 RX ORDER — FENTANYL CITRATE 50 UG/ML
50 INJECTION INTRAVENOUS
Qty: 0 | Refills: 0 | Status: DISCONTINUED | OUTPATIENT
Start: 2019-03-12 | End: 2019-03-12

## 2019-03-12 RX ORDER — ONDANSETRON 8 MG/1
4 TABLET, FILM COATED ORAL ONCE
Qty: 0 | Refills: 0 | Status: DISCONTINUED | OUTPATIENT
Start: 2019-03-12 | End: 2019-03-12

## 2019-03-12 RX ORDER — PHENAZOPYRIDINE HCL 100 MG
1 TABLET ORAL
Qty: 9 | Refills: 0
Start: 2019-03-12 | End: 2019-03-14

## 2019-03-12 RX ORDER — FLUTICASONE FUROATE AND VILANTEROL TRIFENATATE 100; 25 UG/1; UG/1
1 POWDER RESPIRATORY (INHALATION)
Qty: 0 | Refills: 0 | COMMUNITY

## 2019-03-12 RX ORDER — ACETAMINOPHEN 500 MG
975 TABLET ORAL ONCE
Qty: 0 | Refills: 0 | Status: COMPLETED | OUTPATIENT
Start: 2019-03-12 | End: 2019-03-12

## 2019-03-12 RX ORDER — OXYCODONE HYDROCHLORIDE 5 MG/1
5 TABLET ORAL ONCE
Qty: 0 | Refills: 0 | Status: DISCONTINUED | OUTPATIENT
Start: 2019-03-12 | End: 2019-03-12

## 2019-03-12 RX ORDER — SODIUM CHLORIDE 9 MG/ML
1000 INJECTION, SOLUTION INTRAVENOUS
Qty: 0 | Refills: 0 | Status: DISCONTINUED | OUTPATIENT
Start: 2019-03-12 | End: 2019-03-12

## 2019-03-12 RX ORDER — FINASTERIDE 5 MG/1
1 TABLET, FILM COATED ORAL
Qty: 0 | Refills: 0 | COMMUNITY

## 2019-03-12 RX ORDER — TAMSULOSIN HYDROCHLORIDE 0.4 MG/1
1 CAPSULE ORAL
Qty: 0 | Refills: 0 | COMMUNITY

## 2019-03-12 RX ADMIN — OXYCODONE HYDROCHLORIDE 5 MILLIGRAM(S): 5 TABLET ORAL at 11:45

## 2019-03-12 RX ADMIN — Medication 200 MILLIGRAM(S): at 12:29

## 2019-03-12 RX ADMIN — FAMOTIDINE 20 MILLIGRAM(S): 10 INJECTION INTRAVENOUS at 08:08

## 2019-03-12 RX ADMIN — Medication 975 MILLIGRAM(S): at 08:08

## 2019-03-12 RX ADMIN — OXYCODONE HYDROCHLORIDE 5 MILLIGRAM(S): 5 TABLET ORAL at 11:29

## 2019-03-12 NOTE — BRIEF OPERATIVE NOTE - NSICDXBRIEFPROCEDURE_GEN_ALL_CORE_FT
PROCEDURES:  Transurethral resection of prostate using bipolar cautery 12-Mar-2019 11:17:47  Sriram Carrera

## 2019-03-12 NOTE — ASU DISCHARGE PLAN (ADULT/PEDIATRIC) - PROVIDER TOKENS
FREE:[LAST:[Deandre],FIRST:[Sriram],PHONE:[phone]],FAX:[(   )    -],ADDRESS:[[facility] [address]  Follow up on 3/15/19 at 10a]]

## 2019-03-12 NOTE — ASU DISCHARGE PLAN (ADULT/PEDIATRIC) - CALL YOUR DOCTOR IF YOU HAVE ANY OF THE FOLLOWING:
Inability to tolerate liquids or foods/Fever greater than (need to indicate Fahrenheit or Celsius)/Pain not relieved by Medications/Nausea and vomiting that does not stop

## 2019-03-12 NOTE — ASU DISCHARGE PLAN (ADULT/PEDIATRIC) - CARE PROVIDER_API CALL
Sriram Carrera  [address]  Follow up on 3/15/19 at 10a  Phone: [phone]  Fax: (   )    -  Follow Up Time:

## 2019-03-12 NOTE — ASU DISCHARGE PLAN (ADULT/PEDIATRIC) - NURSING INSTRUCTIONS
Review the multicolored Written Discharge Instruction sheet.  Call MD if pain medication not working.  Call MD if unable to urinate in 8 hours.  Empty francisco bag  as needed

## 2019-03-12 NOTE — BRIEF OPERATIVE NOTE - NSICDXBRIEFPREOP_GEN_ALL_CORE_FT
PRE-OP DIAGNOSIS:  BPH with obstruction/lower urinary tract symptoms 12-Mar-2019 11:18:33  Sriram Carrera  BPH with obstruction/lower urinary tract symptoms 12-Mar-2019 11:18:10  Sriram Carrera

## 2019-03-12 NOTE — ASU DISCHARGE PLAN (ADULT/PEDIATRIC) - ASU DC SPECIAL INSTRUCTIONSFT
Present to office or ED if catheter stops draining, leaking around the catheter or severe lower abdominal pain

## 2019-03-12 NOTE — ASU PATIENT PROFILE, ADULT - LANGUAGE ASSISTANCE NEEDED
Yes-Patient/Caregiver accepts free interpretation services.../Gave permission for son n law to interpret if needed

## 2019-03-15 ENCOUNTER — APPOINTMENT (OUTPATIENT)
Age: 59
End: 2019-03-15
Payer: COMMERCIAL

## 2019-03-15 PROBLEM — J45.909 UNSPECIFIED ASTHMA, UNCOMPLICATED: Chronic | Status: ACTIVE | Noted: 2019-03-12

## 2019-03-15 LAB — SURGICAL PATHOLOGY FINAL REPORT - CH: SIGNIFICANT CHANGE UP

## 2019-03-15 PROCEDURE — 99024 POSTOP FOLLOW-UP VISIT: CPT

## 2019-03-15 NOTE — HISTORY OF PRESENT ILLNESS
[FreeTextEntry1] : 58 yo male presents for follow up. \par Pt primarily Sierra Leonean speaking with limited English visit conducted with help of son in law over the phone.\par s/p Transurethral resection of prostate- Bipolar done 3/12/19 at [facility]. \par Doing well, saw some blood in bag 1st day. Has some discomfort from Rareaga. \par Arreaga to leg bag draining clear urine. \par Denies fever, chills or rigors. \par \par \par

## 2019-03-15 NOTE — REVIEW OF SYSTEMS
[Abdominal Pain] : abdominal pain [Pain during urination] : pain during urination [Slow urine stream] : slow urine stream [Joint Pain] : joint pain [Negative] : Heme/Lymph

## 2019-03-15 NOTE — PHYSICAL EXAM
[General Appearance - In No Acute Distress] : no acute distress [FreeTextEntry1] : Arreaga to leg bag draining clear urine  [] : no respiratory distress [Oriented To Time, Place, And Person] : oriented to person, place, and time

## 2019-03-15 NOTE — ASSESSMENT
[FreeTextEntry1] : Benign Prostatic Hyperplasia:\par s/p Transurethral resection of prostate- Bipolar done 3/12/19 at [facility]. \par Fill and Pull:\par After irrigating the catheter instilled 240 ml of SW into the bladder, removed Arreaga, Pt urinated 240 ml of clear fluid with good stream. \par Stop Flomax.\par Continue Finasteride. \par \par Return to office in 6 weeks or sooner if any issues.

## 2019-03-18 ENCOUNTER — APPOINTMENT (OUTPATIENT)
Dept: NEUROLOGY | Facility: CLINIC | Age: 59
End: 2019-03-18
Payer: COMMERCIAL

## 2019-03-18 VITALS
DIASTOLIC BLOOD PRESSURE: 80 MMHG | SYSTOLIC BLOOD PRESSURE: 120 MMHG | BODY MASS INDEX: 26.98 KG/M2 | WEIGHT: 158 LBS | HEIGHT: 64 IN

## 2019-03-18 DIAGNOSIS — K64.4 RESIDUAL HEMORRHOIDAL SKIN TAGS: ICD-10-CM

## 2019-03-18 DIAGNOSIS — N13.8 OTHER OBSTRUCTIVE AND REFLUX UROPATHY: ICD-10-CM

## 2019-03-18 DIAGNOSIS — J45.40 MODERATE PERSISTENT ASTHMA, UNCOMPLICATED: ICD-10-CM

## 2019-03-18 DIAGNOSIS — R35.0 FREQUENCY OF MICTURITION: ICD-10-CM

## 2019-03-18 DIAGNOSIS — N41.1 CHRONIC PROSTATITIS: ICD-10-CM

## 2019-03-18 DIAGNOSIS — F41.9 ANXIETY DISORDER, UNSPECIFIED: ICD-10-CM

## 2019-03-18 DIAGNOSIS — G31.84 MILD COGNITIVE IMPAIRMENT, SO STATED: ICD-10-CM

## 2019-03-18 DIAGNOSIS — N40.1 BENIGN PROSTATIC HYPERPLASIA WITH LOWER URINARY TRACT SYMPTOMS: ICD-10-CM

## 2019-03-18 DIAGNOSIS — Z82.49 FAMILY HISTORY OF ISCHEMIC HEART DISEASE AND OTHER DISEASES OF THE CIRCULATORY SYSTEM: ICD-10-CM

## 2019-03-18 DIAGNOSIS — Z87.440 PERSONAL HISTORY OF URINARY (TRACT) INFECTIONS: ICD-10-CM

## 2019-03-18 DIAGNOSIS — R39.11 HESITANCY OF MICTURITION: ICD-10-CM

## 2019-03-18 DIAGNOSIS — M19.019 PRIMARY OSTEOARTHRITIS, UNSPECIFIED SHOULDER: ICD-10-CM

## 2019-03-18 DIAGNOSIS — Z88.6 ALLERGY STATUS TO ANALGESIC AGENT: ICD-10-CM

## 2019-03-18 DIAGNOSIS — E78.00 PURE HYPERCHOLESTEROLEMIA, UNSPECIFIED: ICD-10-CM

## 2019-03-18 DIAGNOSIS — N52.9 MALE ERECTILE DYSFUNCTION, UNSPECIFIED: ICD-10-CM

## 2019-03-18 PROCEDURE — 99204 OFFICE O/P NEW MOD 45 MIN: CPT

## 2019-03-22 ENCOUNTER — RESULT CHARGE (OUTPATIENT)
Age: 59
End: 2019-03-22

## 2019-03-22 ENCOUNTER — CLINICAL ADVICE (OUTPATIENT)
Age: 59
End: 2019-03-22

## 2019-03-22 ENCOUNTER — APPOINTMENT (OUTPATIENT)
Age: 59
End: 2019-03-22
Payer: COMMERCIAL

## 2019-03-22 ENCOUNTER — LABORATORY RESULT (OUTPATIENT)
Age: 59
End: 2019-03-22

## 2019-03-22 LAB
BILIRUB UR QL STRIP: NORMAL
CLARITY UR: CLEAR
COLLECTION METHOD: NORMAL
GLUCOSE UR-MCNC: NORMAL
HCG UR QL: 0.2 EU/DL
HGB UR QL STRIP.AUTO: NORMAL
KETONES UR-MCNC: NORMAL
LEUKOCYTE ESTERASE UR QL STRIP: NORMAL
NITRITE UR QL STRIP: NORMAL
PH UR STRIP: 6
PROT UR STRIP-MCNC: NORMAL
SP GR UR STRIP: 1.01

## 2019-03-22 PROCEDURE — 99024 POSTOP FOLLOW-UP VISIT: CPT

## 2019-03-22 NOTE — PHYSICAL EXAM
[General Appearance - In No Acute Distress] : no acute distress [Abdomen Soft] : soft [Abdomen Tenderness] : non-tender [Costovertebral Angle Tenderness] : no ~M costovertebral angle tenderness [] : no respiratory distress [Oriented To Time, Place, And Person] : oriented to person, place, and time [Normal Station and Gait] : the gait and station were normal for the patient's age

## 2019-03-22 NOTE — HISTORY OF PRESENT ILLNESS
[FreeTextEntry1] : 58 yo male presents with complaint of dysuria. \par Pt primarily Greenlandic speaking with limited English visit conducted with help of accompanying daughter. \par Mentions since catheter removal has been urinating with good stream but has dysuria and urinates every 1-2 hrs with urgency and nocturia of 2-3 x. No urinary incontinence. \par Denies hematuria, lower abdominal or flank pain, fever, chills or rigors. \par \par s/p Transurethral resection of prostate- Bipolar done 3/12/19 at [facility]. \par \par \par \par

## 2019-03-22 NOTE — ASSESSMENT
[FreeTextEntry1] : Benign Prostatic Hyperplasia:\par s/p Transurethral resection of prostate- Bipolar done 3/12/19 at [facility]. \par Continue Finasteride. \par \par Dysuria:\par Will get Urinalysis with reflex Urine culture. Will inform results. \par Pyridium. \par \par Urinary urgency: \par Gave samples of Vesicare. \par

## 2019-03-23 ENCOUNTER — LABORATORY RESULT (OUTPATIENT)
Age: 59
End: 2019-03-23

## 2019-03-25 LAB
APPEARANCE: CLEAR
BILIRUBIN URINE: NEGATIVE
BLOOD URINE: ABNORMAL
COLOR: NORMAL
GLUCOSE QUALITATIVE U: NEGATIVE
KETONES URINE: NEGATIVE
LEUKOCYTE ESTERASE URINE: ABNORMAL
NITRITE URINE: NEGATIVE
PH URINE: 6
PROTEIN URINE: ABNORMAL
SPECIFIC GRAVITY URINE: 1.01
UROBILINOGEN URINE: NORMAL

## 2019-04-10 ENCOUNTER — APPOINTMENT (OUTPATIENT)
Dept: INTERNAL MEDICINE | Facility: CLINIC | Age: 59
End: 2019-04-10

## 2019-05-02 ENCOUNTER — APPOINTMENT (OUTPATIENT)
Dept: UROLOGY | Facility: CLINIC | Age: 59
End: 2019-05-02
Payer: COMMERCIAL

## 2019-05-02 ENCOUNTER — LABORATORY RESULT (OUTPATIENT)
Age: 59
End: 2019-05-02

## 2019-05-02 DIAGNOSIS — Z87.898 PERSONAL HISTORY OF OTHER SPECIFIED CONDITIONS: ICD-10-CM

## 2019-05-02 LAB
BILIRUB UR QL STRIP: NORMAL
CLARITY UR: CLEAR
COLLECTION METHOD: NORMAL
GLUCOSE UR-MCNC: NORMAL
HCG UR QL: 0.2 EU/DL
HGB UR QL STRIP.AUTO: NORMAL
KETONES UR-MCNC: NORMAL
LEUKOCYTE ESTERASE UR QL STRIP: NORMAL
NITRITE UR QL STRIP: NORMAL
PH UR STRIP: 6.5
PROT UR STRIP-MCNC: NORMAL
SP GR UR STRIP: 1.1

## 2019-05-02 PROCEDURE — 99024 POSTOP FOLLOW-UP VISIT: CPT

## 2019-05-02 PROCEDURE — 81003 URINALYSIS AUTO W/O SCOPE: CPT | Mod: QW

## 2019-05-02 NOTE — HISTORY OF PRESENT ILLNESS
[FreeTextEntry1] : 60 yo male presents for follow up.\par Pt primarily Azerbaijani speaking with limited English visit conducted with help of accompanying son in law. \par Urinates with good flow every 1-2 hrs and nocturia of 2-3 x. No urinary incontinence. \par Has off and on dysuria and has been having suprapubic pain.\par Denies hematuria, lower abdominal or flank pain, fever, chills or rigors. \par \par s/p Transurethral resection of prostate- Bipolar done 3/12/19 at [facility]. \par \par \par \par

## 2019-05-02 NOTE — ASSESSMENT
[FreeTextEntry1] : Dysuria:\par Will get Urinalysis with reflex Urine culture. \par Ceftin. \par \par Benign Prostatic Hyperplasia:\par Continue Finasteride. \par \par Return to office in 6 weeks or sooner if any issues- will do Uroflo and PVR.

## 2019-05-02 NOTE — PHYSICAL EXAM
[Abdomen Tenderness] : non-tender [Abdomen Soft] : soft [General Appearance - In No Acute Distress] : no acute distress [Skin Color & Pigmentation] : normal skin color and pigmentation [Costovertebral Angle Tenderness] : no ~M costovertebral angle tenderness [Abdomen Mass (___ Cm)] : no abdominal mass palpated [Oriented To Time, Place, And Person] : oriented to person, place, and time [] : no respiratory distress [FreeTextEntry1] : normal peripheral circulation

## 2019-05-03 LAB
APPEARANCE: CLEAR
BILIRUBIN URINE: NEGATIVE
BLOOD URINE: NORMAL
COLOR: COLORLESS
GLUCOSE QUALITATIVE U: NEGATIVE
KETONES URINE: NEGATIVE
LEUKOCYTE ESTERASE URINE: ABNORMAL
NITRITE URINE: NEGATIVE
PH URINE: 6
PROTEIN URINE: NEGATIVE
SPECIFIC GRAVITY URINE: 1.01
UROBILINOGEN URINE: NORMAL

## 2019-05-10 ENCOUNTER — APPOINTMENT (OUTPATIENT)
Dept: INTERNAL MEDICINE | Facility: CLINIC | Age: 59
End: 2019-05-10
Payer: COMMERCIAL

## 2019-05-10 VITALS — WEIGHT: 164 LBS | BODY MASS INDEX: 28 KG/M2 | HEIGHT: 64 IN

## 2019-05-10 VITALS — RESPIRATION RATE: 16 BRPM | SYSTOLIC BLOOD PRESSURE: 118 MMHG | HEART RATE: 72 BPM | DIASTOLIC BLOOD PRESSURE: 76 MMHG

## 2019-05-10 DIAGNOSIS — R30.0 DYSURIA: ICD-10-CM

## 2019-05-10 DIAGNOSIS — L85.3 XEROSIS CUTIS: ICD-10-CM

## 2019-05-10 LAB
BILIRUB UR QL STRIP: NEGATIVE
CLARITY UR: CLEAR
COLLECTION METHOD: NORMAL
GLUCOSE UR-MCNC: NEGATIVE
HCG UR QL: 0.2 EU/DL
HGB UR QL STRIP.AUTO: NORMAL
KETONES UR-MCNC: NEGATIVE
LEUKOCYTE ESTERASE UR QL STRIP: NORMAL
NITRITE UR QL STRIP: NEGATIVE
PH UR STRIP: 6.5
PROT UR STRIP-MCNC: NEGATIVE
SP GR UR STRIP: 1.02

## 2019-05-10 PROCEDURE — 99214 OFFICE O/P EST MOD 30 MIN: CPT

## 2019-05-10 PROCEDURE — 81003 URINALYSIS AUTO W/O SCOPE: CPT | Mod: QW

## 2019-05-10 NOTE — HISTORY OF PRESENT ILLNESS
[FreeTextEntry1] : for follow up  [de-identified] : patint states bladder does not feel right\par patient has no chest pain sob nvd or palpitaiton\par he feels discomfort with urination\par and his back hurts\par also hemorrhoids bothering him\par he has not  yet recovered from his surgery\par he feel his back is dry and itchy

## 2019-05-10 NOTE — PHYSICAL EXAM
[No Acute Distress] : no acute distress [Well Developed] : well developed [Well-Appearing] : well-appearing [Well Nourished] : well nourished [PERRL] : pupils equal round and reactive to light [Normal Sclera/Conjunctiva] : normal sclera/conjunctiva [EOMI] : extraocular movements intact [Normal Outer Ear/Nose] : the outer ears and nose were normal in appearance [Normal Oropharynx] : the oropharynx was normal [No JVD] : no jugular venous distention [No Lymphadenopathy] : no lymphadenopathy [Supple] : supple [No Respiratory Distress] : no respiratory distress  [Thyroid Normal, No Nodules] : the thyroid was normal and there were no nodules present [Clear to Auscultation] : lungs were clear to auscultation bilaterally [No Accessory Muscle Use] : no accessory muscle use [Normal Rate] : normal rate  [Regular Rhythm] : with a regular rhythm [Normal S1, S2] : normal S1 and S2 [No Murmur] : no murmur heard [No Carotid Bruits] : no carotid bruits [No Abdominal Bruit] : a ~M bruit was not heard ~T in the abdomen [No Varicosities] : no varicosities [Pedal Pulses Present] : the pedal pulses are present [No Edema] : there was no peripheral edema [No Extremity Clubbing/Cyanosis] : no extremity clubbing/cyanosis [No Palpable Aorta] : no palpable aorta [Soft] : abdomen soft [Non-distended] : non-distended [Non Tender] : non-tender [No Masses] : no abdominal mass palpated [No HSM] : no HSM [Normal Bowel Sounds] : normal bowel sounds [Normal Posterior Cervical Nodes] : no posterior cervical lymphadenopathy [Normal Anterior Cervical Nodes] : no anterior cervical lymphadenopathy [No Joint Swelling] : no joint swelling [Grossly Normal Strength/Tone] : grossly normal strength/tone [No Rash] : no rash [Normal Gait] : normal gait [Coordination Grossly Intact] : coordination grossly intact [No Focal Deficits] : no focal deficits [Normal Affect] : the affect was normal [Deep Tendon Reflexes (DTR)] : deep tendon reflexes were 2+ and symmetric [Normal Insight/Judgement] : insight and judgment were intact [de-identified] : back pain mid back radiates to front when leaning over

## 2019-05-11 LAB
APPEARANCE: CLEAR
BACTERIA: NEGATIVE
BILIRUBIN URINE: NEGATIVE
BLOOD URINE: ABNORMAL
COLOR: YELLOW
GLUCOSE QUALITATIVE U: NEGATIVE
HYALINE CASTS: 0 /LPF
KETONES URINE: NEGATIVE
LEUKOCYTE ESTERASE URINE: NEGATIVE
MICROSCOPIC-UA: NORMAL
NITRITE URINE: NEGATIVE
PH URINE: 6.5
PROTEIN URINE: NORMAL
RED BLOOD CELLS URINE: 5 /HPF
SPECIFIC GRAVITY URINE: 1.02
SQUAMOUS EPITHELIAL CELLS: 5 /HPF
URINE COMMENTS: NORMAL
UROBILINOGEN URINE: NORMAL
WHITE BLOOD CELLS URINE: 9 /HPF

## 2019-05-13 LAB — BACTERIA UR CULT: NORMAL

## 2019-05-14 ENCOUNTER — CHART COPY (OUTPATIENT)
Age: 59
End: 2019-05-14

## 2019-05-31 ENCOUNTER — APPOINTMENT (OUTPATIENT)
Dept: INTERNAL MEDICINE | Facility: CLINIC | Age: 59
End: 2019-05-31
Payer: COMMERCIAL

## 2019-05-31 ENCOUNTER — APPOINTMENT (OUTPATIENT)
Dept: COLORECTAL SURGERY | Facility: CLINIC | Age: 59
End: 2019-05-31
Payer: COMMERCIAL

## 2019-05-31 VITALS
HEIGHT: 64 IN | HEART RATE: 72 BPM | TEMPERATURE: 98.1 F | DIASTOLIC BLOOD PRESSURE: 71 MMHG | SYSTOLIC BLOOD PRESSURE: 124 MMHG | WEIGHT: 168 LBS | BODY MASS INDEX: 28.68 KG/M2

## 2019-05-31 VITALS — HEART RATE: 70 BPM | SYSTOLIC BLOOD PRESSURE: 112 MMHG | DIASTOLIC BLOOD PRESSURE: 76 MMHG | RESPIRATION RATE: 12 BRPM

## 2019-05-31 VITALS — WEIGHT: 168 LBS | HEIGHT: 64 IN | BODY MASS INDEX: 28.68 KG/M2

## 2019-05-31 DIAGNOSIS — N50.819 TESTICULAR PAIN, UNSPECIFIED: ICD-10-CM

## 2019-05-31 PROCEDURE — 99214 OFFICE O/P EST MOD 30 MIN: CPT

## 2019-05-31 PROCEDURE — 99205 OFFICE O/P NEW HI 60 MIN: CPT

## 2019-05-31 NOTE — ASSESSMENT
[FreeTextEntry1] : sterodis for back pain \par obtain x ray\par testicular sono for testicular pain \par bph stable urinating ok

## 2019-05-31 NOTE — HISTORY OF PRESENT ILLNESS
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

## 2019-06-01 NOTE — PHYSICAL EXAM
[Abdomen Masses] : No abdominal masses [Abdomen Tenderness] : ~T No ~M abdominal tenderness [Tender] : nontender [Manually Reducible] : a manually reducible (grade III) [Tender, Swollen] : tender, swollen [JVD] : no jugular venous distention  [Normal Breath Sounds] : Normal breath sounds [Normal Heart Sounds] : normal heart sounds [Normal Rate and Rhythm] : normal rate and rhythm [No Rash or Lesion] : No rash or lesion [Alert] : alert [Oriented to Person] : oriented to person [Oriented to Place] : oriented to place [Oriented to Time] : oriented to time [Calm] : calm [de-identified] : Looks well in no distress, of stated age. [de-identified] : pupils equal reactive to light normocephalic atraumatic. [de-identified] : moves all 4 extremities appropriately with 5 over 5 strength

## 2019-06-01 NOTE — HISTORY OF PRESENT ILLNESS
[FreeTextEntry1] : 59-year-old male consultation for screening colonoscopy he denies any abdominal pain changes in bowel habits but does admit to occasional rectal bleeding from hemorrhoids.

## 2019-06-01 NOTE — ASSESSMENT
[FreeTextEntry1] : 59-year-old male for screening colonoscopy. Recommend colonoscopy. Risk and benefits of colonoscopy have been discussed which include but not limited to bleeding, infection, perforation, missing a polyp or cancer occurring 5%. Complications related to the preparation including dehydration and renal failure.

## 2019-06-05 ENCOUNTER — CHART COPY (OUTPATIENT)
Age: 59
End: 2019-06-05

## 2019-06-06 ENCOUNTER — MEDICATION RENEWAL (OUTPATIENT)
Age: 59
End: 2019-06-06

## 2019-06-06 ENCOUNTER — CHART COPY (OUTPATIENT)
Age: 59
End: 2019-06-06

## 2019-06-06 RX ORDER — CEFUROXIME AXETIL 500 MG/1
500 TABLET ORAL
Qty: 14 | Refills: 0 | Status: DISCONTINUED | COMMUNITY
Start: 2019-05-02 | End: 2019-06-06

## 2019-06-07 ENCOUNTER — CHART COPY (OUTPATIENT)
Age: 59
End: 2019-06-07

## 2019-06-12 ENCOUNTER — MEDICATION RENEWAL (OUTPATIENT)
Age: 59
End: 2019-06-12

## 2019-06-14 ENCOUNTER — OTHER (OUTPATIENT)
Age: 59
End: 2019-06-14

## 2019-06-20 ENCOUNTER — APPOINTMENT (OUTPATIENT)
Dept: UROLOGY | Facility: CLINIC | Age: 59
End: 2019-06-20
Payer: COMMERCIAL

## 2019-06-20 DIAGNOSIS — N52.9 MALE ERECTILE DYSFUNCTION, UNSPECIFIED: ICD-10-CM

## 2019-06-20 PROCEDURE — 51798 US URINE CAPACITY MEASURE: CPT | Mod: 59

## 2019-06-20 PROCEDURE — 51741 ELECTRO-UROFLOWMETRY FIRST: CPT

## 2019-06-20 PROCEDURE — 99214 OFFICE O/P EST MOD 30 MIN: CPT | Mod: 25

## 2019-06-20 NOTE — PHYSICAL EXAM
[General Appearance - Well Developed] : well developed [Normal Appearance] : normal appearance [General Appearance - In No Acute Distress] : no acute distress [Abdomen Soft] : soft [Abdomen Tenderness] : non-tender [Costovertebral Angle Tenderness] : no ~M costovertebral angle tenderness [Abdomen Mass (___ Cm)] : no abdominal mass palpated [Skin Color & Pigmentation] : normal skin color and pigmentation [FreeTextEntry1] : normal peripheral circulation [] : no respiratory distress [Oriented To Time, Place, And Person] : oriented to person, place, and time [Normal Station and Gait] : the gait and station were normal for the patient's age [No Palpable Adenopathy] : no palpable adenopathy [No Focal Deficits] : no focal deficits

## 2019-06-20 NOTE — ASSESSMENT
[FreeTextEntry1] : Benign Prostatic Hyperplasia:\par See Uroflo and PVR. \par Stop Finasteride. \par \par Erectile dysfunction:\par Reviewed pathophysiology and differential diagnosis of erectile dysfunction with the patient. Discussed lifestyle changes. \par The patient was made aware that the current therapies for erectile dysfunction consisting of oral phosphodiesterase type 5 inhibitors, intra-urethral alprostadil, intracavernous vasoactive drug injection, vacuum constriction devices and penile prosthesis implantation. Relative risks and benefits, were discussed. All questions were answered.\par Wants to try Cialis. Discussed proper use. Recommended that patient start with half tablet and try a few times and increase up to maximum of 20 mg to be taken as needed not on 2 consecutive days. The patient understands that these medications are not initiators of erection and that he will still require sexual stimulation. He was advised to take the medication 30-60 minutes prior to sexual activity and that the efficacy of the medication is decreased following a high-fat meal or concurrent use of alcohol. I explained the common adverse effects of therapy including, but not limited to headache, flushing, upset stomach, nasal congestion, abnormal vision, back pain, and myalgia. Asked pt to stop taking the medicine if he develops chest pain, visual or auditory disturbance. Explained priapism- if erection does not go down after ejaculation or lasts more than 4 hrs to present to emergency room. \par \par Return to office in 6 months or sooner if any issues- will do Uroflo and PVR.

## 2019-06-20 NOTE — HISTORY OF PRESENT ILLNESS
[FreeTextEntry1] : 58 yo male presents for follow up.\par Pt primarily Japanese speaking with limited English visit conducted with help of [place] .  \par Taking Finasteride. Urinates with good flow every few hours during the day and nocturia 1-2 x. \par Denies dysuria, hematuria, lower abdominal or flank pain, fever, chills or rigors. \par Rates erections as 3/5. Reports normal libido. Complaining of retrograde ejaculation. \par \par s/p Transurethral resection of prostate- Bipolar done 3/12/19 at [facility]. \par \par \par \par

## 2019-06-20 NOTE — REASON FOR VISIT
[Follow-up Visit ___] : a follow-up visit  for [unfilled] [[employer] ] : provided by [employer]   [Spouse] : spouse [FreeTextEntry1] : Cleo [FreeTextEntry2] : [id number]

## 2019-06-21 ENCOUNTER — APPOINTMENT (OUTPATIENT)
Dept: UROLOGY | Facility: CLINIC | Age: 59
End: 2019-06-21

## 2019-06-27 ENCOUNTER — TRANSCRIPTION ENCOUNTER (OUTPATIENT)
Age: 59
End: 2019-06-27

## 2019-06-28 ENCOUNTER — APPOINTMENT (OUTPATIENT)
Dept: COLORECTAL SURGERY | Facility: HOSPITAL | Age: 59
End: 2019-06-28
Payer: COMMERCIAL

## 2019-06-28 ENCOUNTER — OUTPATIENT (OUTPATIENT)
Dept: OUTPATIENT SERVICES | Facility: HOSPITAL | Age: 59
LOS: 1 days | End: 2019-06-28
Payer: COMMERCIAL

## 2019-06-28 DIAGNOSIS — Z12.11 ENCOUNTER FOR SCREENING FOR MALIGNANT NEOPLASM OF COLON: ICD-10-CM

## 2019-06-28 PROCEDURE — 45378 DIAGNOSTIC COLONOSCOPY: CPT

## 2019-06-28 PROCEDURE — G0121: CPT

## 2019-09-25 ENCOUNTER — EMERGENCY (EMERGENCY)
Facility: HOSPITAL | Age: 59
LOS: 1 days | Discharge: HOME | End: 2019-09-25
Attending: EMERGENCY MEDICINE
Payer: COMMERCIAL

## 2019-09-25 VITALS
RESPIRATION RATE: 20 BRPM | TEMPERATURE: 98 F | HEART RATE: 73 BPM | HEIGHT: 63 IN | SYSTOLIC BLOOD PRESSURE: 133 MMHG | WEIGHT: 156.97 LBS | OXYGEN SATURATION: 98 % | DIASTOLIC BLOOD PRESSURE: 77 MMHG

## 2019-09-25 PROCEDURE — 99283 EMERGENCY DEPT VISIT LOW MDM: CPT | Mod: 25

## 2019-09-25 PROCEDURE — 65220 REMOVE FOREIGN BODY FROM EYE: CPT

## 2019-09-25 NOTE — ED ADULT TRIAGE NOTE - CHIEF COMPLAINT QUOTE
pt was at work, was cutting an a piece of metal got into the right eye today. pt unable to open his eye

## 2019-09-26 PROCEDURE — 90471 IMMUNIZATION ADMIN: CPT

## 2019-09-26 PROCEDURE — 65220 REMOVE FOREIGN BODY FROM EYE: CPT | Mod: RT

## 2019-09-26 PROCEDURE — 99283 EMERGENCY DEPT VISIT LOW MDM: CPT | Mod: 25

## 2019-09-26 PROCEDURE — 90715 TDAP VACCINE 7 YRS/> IM: CPT

## 2019-09-26 RX ORDER — TETANUS TOXOID, REDUCED DIPHTHERIA TOXOID AND ACELLULAR PERTUSSIS VACCINE, ADSORBED 5; 2.5; 8; 8; 2.5 [IU]/.5ML; [IU]/.5ML; UG/.5ML; UG/.5ML; UG/.5ML
0.5 SUSPENSION INTRAMUSCULAR ONCE
Refills: 0 | Status: DISCONTINUED | OUTPATIENT
Start: 2019-09-26 | End: 2019-10-05

## 2019-09-26 RX ORDER — CIPROFLOXACIN HCL 0.3 %
1 DROPS OPHTHALMIC (EYE) ONCE
Refills: 0 | Status: COMPLETED | OUTPATIENT
Start: 2019-09-26 | End: 2019-09-26

## 2019-09-26 RX ORDER — TETANUS TOXOID, REDUCED DIPHTHERIA TOXOID AND ACELLULAR PERTUSSIS VACCINE, ADSORBED 5; 2.5; 8; 8; 2.5 [IU]/.5ML; [IU]/.5ML; UG/.5ML; UG/.5ML; UG/.5ML
0.5 SUSPENSION INTRAMUSCULAR ONCE
Refills: 0 | Status: COMPLETED | OUTPATIENT
Start: 2019-09-26 | End: 2019-09-26

## 2019-09-26 RX ADMIN — Medication 1 DROP(S): at 00:58

## 2019-09-26 RX ADMIN — TETANUS TOXOID, REDUCED DIPHTHERIA TOXOID AND ACELLULAR PERTUSSIS VACCINE, ADSORBED 0.5 MILLILITER(S): 5; 2.5; 8; 8; 2.5 SUSPENSION INTRAMUSCULAR at 00:57

## 2019-09-26 NOTE — ED PROCEDURE NOTE - CPROC ED FOREIGN BODY DETAIL1
The area was draped and prepped and the anatomic location of the suspected foreign body was explored in a bloodless field./removed with q-tip

## 2019-09-26 NOTE — ED PROVIDER NOTE - ATTENDING CONTRIBUTION TO CARE
60 yo M c/o f.b. sensation to OD after using saw to cut metal and was not wearing propper eye protection.  On exam pt with obvious f.b. 7 o'clock position which was removed with irrigation and Q-tip. + corneal abrasion with + uptake on fluorescein.  Rx Cipro eye drops with Ophto f/u in the AM

## 2019-09-26 NOTE — ED ADULT NURSE NOTE - NSIMPLEMENTINTERV_GEN_ALL_ED
Implemented All Universal Safety Interventions:  [place] to call system. Call bell, personal items and telephone within reach. Instruct patient to call for assistance. Room bathroom lighting operational. Non-slip footwear when patient is off stretcher. Physically safe environment: no spills, clutter or unnecessary equipment. Stretcher in lowest position, wheels locked, appropriate side rails in place.

## 2019-09-26 NOTE — ED PROVIDER NOTE - PATIENT PORTAL LINK FT
You can access the FollowMyHealth Patient Portal offered by [facility] by registering at the following website: [email or web address]. By joining [employer]’s FollowMyHealth portal, you will also be able to view your health information using other applications (apps) compatible with our system.

## 2019-09-26 NOTE — ED ADULT NURSE NOTE - OBJECTIVE STATEMENT
pt lying in bed with daughter at bedside. pt is alert and oriented x3. pt c/o of right eye pain. as per pt he was cutting metal at work and a piece of metal hit and went into his eye. upon assessment pt right eye is painful to movement and touch, sensitive to light. no swelling, or drainage noted

## 2019-09-26 NOTE — ED PROVIDER NOTE - CLINICAL SUMMARY MEDICAL DECISION MAKING FREE TEXT BOX
60 y/o male with hx of asthma, HLD presents to the ED alongside daughter complaining of foreign body to the right eye since yesterday morning.  remove foreign body and update tetanus, f/u with optho

## 2019-09-27 ENCOUNTER — CHART COPY (OUTPATIENT)
Age: 59
End: 2019-09-27

## 2019-12-11 ENCOUNTER — MESSAGE (OUTPATIENT)
Age: 59
End: 2019-12-11

## 2019-12-12 ENCOUNTER — APPOINTMENT (OUTPATIENT)
Dept: UROLOGY | Facility: CLINIC | Age: 59
End: 2019-12-12

## 2019-12-19 ENCOUNTER — APPOINTMENT (OUTPATIENT)
Dept: UROLOGY | Facility: CLINIC | Age: 59
End: 2019-12-19
Payer: COMMERCIAL

## 2019-12-19 DIAGNOSIS — N40.0 BENIGN PROSTATIC HYPERPLASIA WITHOUT LOWER URINARY TRACT SYMPMS: ICD-10-CM

## 2019-12-19 PROCEDURE — 51798 US URINE CAPACITY MEASURE: CPT | Mod: 59

## 2019-12-19 PROCEDURE — 51741 ELECTRO-UROFLOWMETRY FIRST: CPT

## 2019-12-19 PROCEDURE — 99213 OFFICE O/P EST LOW 20 MIN: CPT | Mod: 25

## 2020-01-20 PROBLEM — N40.0 BENIGN PROSTATE HYPERPLASIA: Noted: 2017-03-17

## 2020-01-20 NOTE — HISTORY OF PRESENT ILLNESS
[FreeTextEntry1] : 58 yo male presents for follow up.\par Off Finasteride. Urinating well- good flow, every few hours during the day and nocturia 1-2 x. \par Denies dysuria, hematuria, lower abdominal or flank pain, fever, chills or rigors. \par Rates erections as 3/5. Reports normal libido. Complaining of retrograde ejaculation. \par \par s/p Transurethral resection of prostate- Bipolar done 3/12/19 at [facility]. \par \par \par \par

## 2020-01-20 NOTE — ASSESSMENT
[FreeTextEntry1] : Benign Prostatic Hyperplasia:\par See Uroflo and PVR. \par s/p Transurethral resection of prostate- Bipolar done 3/12/19 at [facility]. \par Off Finasteride- urinating well. \par \par Return to office in 1 year or sooner if any issues- will do Uroflo and PVR.

## 2020-03-18 ENCOUNTER — APPOINTMENT (OUTPATIENT)
Dept: INTERNAL MEDICINE | Facility: CLINIC | Age: 60
End: 2020-03-18
Payer: COMMERCIAL

## 2020-03-18 VITALS
RESPIRATION RATE: 12 BRPM | WEIGHT: 153 LBS | BODY MASS INDEX: 26.12 KG/M2 | HEART RATE: 68 BPM | HEIGHT: 64 IN | SYSTOLIC BLOOD PRESSURE: 118 MMHG | DIASTOLIC BLOOD PRESSURE: 78 MMHG

## 2020-03-18 DIAGNOSIS — Z70.8 OTHER SEX COUNSELING: ICD-10-CM

## 2020-03-18 DIAGNOSIS — M54.5 LOW BACK PAIN: ICD-10-CM

## 2020-03-18 DIAGNOSIS — N34.1 NONSPECIFIC URETHRITIS: ICD-10-CM

## 2020-03-18 PROCEDURE — 99214 OFFICE O/P EST MOD 30 MIN: CPT | Mod: 25

## 2020-03-18 PROCEDURE — 36415 COLL VENOUS BLD VENIPUNCTURE: CPT

## 2020-03-18 RX ORDER — CEFTRIAXONE 250 MG/1
250 INJECTION, POWDER, FOR SOLUTION INTRAMUSCULAR; INTRAVENOUS
Qty: 1 | Refills: 0 | Status: COMPLETED | OUTPATIENT
Start: 2020-03-18

## 2020-03-18 RX ADMIN — CEFTRIAXONE MG: 250 INJECTION, POWDER, FOR SOLUTION INTRAMUSCULAR; INTRAVENOUS at 00:00

## 2020-03-18 NOTE — HISTORY OF PRESENT ILLNESS
[FreeTextEntry1] : for follow up  [de-identified] : for follow up \par as in DR had unprotected intercourse with a lady\par since then some penis burning\par no fever no sob no nvd or paltptations\par has been taking his other meds\par and urine flow feel fine\par he does have low back pain

## 2020-03-18 NOTE — PHYSICAL EXAM
[No Acute Distress] : no acute distress [Well Nourished] : well nourished [Well Developed] : well developed [Well-Appearing] : well-appearing [Normal Sclera/Conjunctiva] : normal sclera/conjunctiva [PERRL] : pupils equal round and reactive to light [EOMI] : extraocular movements intact [Normal Outer Ear/Nose] : the outer ears and nose were normal in appearance [Normal Oropharynx] : the oropharynx was normal [No JVD] : no jugular venous distention [No Lymphadenopathy] : no lymphadenopathy [Supple] : supple [Thyroid Normal, No Nodules] : the thyroid was normal and there were no nodules present [No Respiratory Distress] : no respiratory distress  [No Accessory Muscle Use] : no accessory muscle use [Clear to Auscultation] : lungs were clear to auscultation bilaterally [Normal Rate] : normal rate  [Regular Rhythm] : with a regular rhythm [Normal S1, S2] : normal S1 and S2 [No Murmur] : no murmur heard [No Carotid Bruits] : no carotid bruits [No Abdominal Bruit] : a ~M bruit was not heard ~T in the abdomen [No Varicosities] : no varicosities [Pedal Pulses Present] : the pedal pulses are present [No Edema] : there was no peripheral edema [No Palpable Aorta] : no palpable aorta [No Extremity Clubbing/Cyanosis] : no extremity clubbing/cyanosis [Soft] : abdomen soft [Non Tender] : non-tender [Non-distended] : non-distended [No Masses] : no abdominal mass palpated [No HSM] : no HSM [Normal Bowel Sounds] : normal bowel sounds [Normal Posterior Cervical Nodes] : no posterior cervical lymphadenopathy [Normal Anterior Cervical Nodes] : no anterior cervical lymphadenopathy [No CVA Tenderness] : no CVA  tenderness [No Spinal Tenderness] : no spinal tenderness [No Joint Swelling] : no joint swelling [Grossly Normal Strength/Tone] : grossly normal strength/tone [No Rash] : no rash [Coordination Grossly Intact] : coordination grossly intact [No Focal Deficits] : no focal deficits [Normal Gait] : normal gait [Deep Tendon Reflexes (DTR)] : deep tendon reflexes were 2+ and symmetric [Normal Affect] : the affect was normal [Normal Insight/Judgement] : insight and judgment were intact [de-identified] : normal

## 2020-03-18 NOTE — ASSESSMENT
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

## 2020-03-19 LAB
C TRACH RRNA SPEC QL NAA+PROBE: NOT DETECTED
HIV1+2 AB SPEC QL IA.RAPID: NONREACTIVE
HSV 1+2 IGG SER IA-IMP: NEGATIVE
HSV 1+2 IGG SER IA-IMP: POSITIVE
HSV1 IGG SER QL: 18.4 INDEX
HSV2 IGG SER QL: 0.19 INDEX
N GONORRHOEA RRNA SPEC QL NAA+PROBE: NOT DETECTED
SOURCE AMPLIFICATION: NORMAL
T PALLIDUM AB SER QL IA: NEGATIVE

## 2020-05-19 ENCOUNTER — APPOINTMENT (OUTPATIENT)
Dept: ORTHOPEDIC SURGERY | Facility: CLINIC | Age: 60
End: 2020-05-19
Payer: COMMERCIAL

## 2020-05-19 VITALS
HEART RATE: 83 BPM | WEIGHT: 153 LBS | HEIGHT: 64 IN | BODY MASS INDEX: 26.12 KG/M2 | SYSTOLIC BLOOD PRESSURE: 121 MMHG | DIASTOLIC BLOOD PRESSURE: 78 MMHG

## 2020-05-19 VITALS — TEMPERATURE: 98 F

## 2020-05-19 DIAGNOSIS — M54.2 CERVICALGIA: ICD-10-CM

## 2020-05-19 DIAGNOSIS — M47.816 SPONDYLOSIS W/OUT MYELOPATHY OR RADICULOPATHY, LUMBAR REGION: ICD-10-CM

## 2020-05-19 DIAGNOSIS — E78.00 PURE HYPERCHOLESTEROLEMIA, UNSPECIFIED: ICD-10-CM

## 2020-05-19 DIAGNOSIS — Z82.0 FAMILY HISTORY OF EPILEPSY AND OTHER DISEASES OF THE NERVOUS SYSTEM: ICD-10-CM

## 2020-05-19 PROCEDURE — 72100 X-RAY EXAM L-S SPINE 2/3 VWS: CPT

## 2020-05-19 PROCEDURE — 99204 OFFICE O/P NEW MOD 45 MIN: CPT

## 2020-05-19 NOTE — HISTORY OF PRESENT ILLNESS
[de-identified] : 60 year old M presents with lumbar spine pain. He presents with his daughter. The pain was not always this severe. He works as a . His daughter states that he is always working at a high level, and never takes a break. Pt has not had any physical therapy or chiropractic care.  [Ataxia] : no ataxia [Incontinence] : no incontinence [Urinary Ret.] : no urinary retention [Loss of Dexterity] : good dexterity

## 2020-05-19 NOTE — PHYSICAL EXAM
[Poor Appearance] : well-appearing [Acute Distress] : not in acute distress [de-identified] : CONSTITUTIONAL: The patient is a very pleasant individual who is well-nourished and who appears stated age.\par PSYCHIATRIC: The patient is alert and oriented X 3 and in no apparent distress, and participates with orthopedic evaluation well.\par HEAD: Atraumatic and is nonsyndromic in appearance.\par EENT: No visible thyromegaly, EOMI.\par RESPIRATORY: Respiratory rate is regular, not dyspneic on examination.\par LYMPHATICS: There is no inguinal lymphadenopathy\par INTEGUMENTARY: Skin is clean, dry, and intact about the bilateral lower extremities and lumbar spine.\par VASCULAR: There is brisk capillary refill about the bilateral lower extremities.\par NEUROLOGIC: There are no pathologic reflexes. There is no decrease in sensation of the bilateral lower extremities on Wartenberg pinwheel examination. Deep tendon reflexes are well maintained at 2+/4 of the bilateral lower extremities and are symmetric.\par MUSCULOSKELETAL: There is no visible muscular atrophy. Manual motor strength is well maintained in the bilateral lower extremities. Range of motion of lumbar spine is well maintained. The patient ambulates in a non-myelopathic manner. Negative tension sign and straight leg raise bilaterally. Quad extension, ankle dorsiflexion, EHL, plantar flexion, and ankle eversion are well preserved. Normal secondary orthopaedic exam of bilateral hips, greater trochanteric area, knees and ankles \par \par Mechanically orientated low back pain, lumbar myositis.  [de-identified] : X-ray of the lumbar spine taken today shows essentially normal lumbar XR. No acute processes. Very minimal DDD.

## 2020-05-19 NOTE — DISCUSSION/SUMMARY
[de-identified] : I have recommended that the pt continue with a conservative treatment plan. I will prescribe a Medrol Dose Pack to provide pain relief. Pt has been referred to physical therapy for decreased pain modalities, core strengthening modalities, soft tissue modalities, and physical modalities. Pt was instructed to start home exercises, core strengthening and a sheet was provided. I have discussed the benefits to yoga, Pilates, etc. The pt may follow up here on a PRN basis.

## 2020-05-19 NOTE — ADDENDUM
[FreeTextEntry1] : Documented by Cr Puentes acting as a scribe for NOBLE Birch on 05/19/2020\par All medical record entries made by the Scribe were at my, NOBLE Birch, direction and personally dictated by me on 05/19/2020 . I have reviewed the chart and agree that the record accurately reflects my personal performance of the history, physical exam, assessment and plan. I have also personally directed, reviewed, and agreed with the chart.

## 2020-06-10 ENCOUNTER — APPOINTMENT (OUTPATIENT)
Dept: UROLOGY | Facility: CLINIC | Age: 60
End: 2020-06-10

## 2020-06-16 ENCOUNTER — APPOINTMENT (OUTPATIENT)
Dept: ORTHOPEDIC SURGERY | Facility: CLINIC | Age: 60
End: 2020-06-16

## 2020-07-02 ENCOUNTER — APPOINTMENT (OUTPATIENT)
Dept: ULTRASOUND IMAGING | Facility: CLINIC | Age: 60
End: 2020-07-02

## 2020-07-02 ENCOUNTER — OUTPATIENT (OUTPATIENT)
Dept: OUTPATIENT SERVICES | Facility: HOSPITAL | Age: 60
LOS: 1 days | End: 2020-07-02
Payer: COMMERCIAL

## 2020-07-02 ENCOUNTER — LABORATORY RESULT (OUTPATIENT)
Age: 60
End: 2020-07-02

## 2020-07-02 ENCOUNTER — RESULT REVIEW (OUTPATIENT)
Age: 60
End: 2020-07-02

## 2020-07-02 ENCOUNTER — APPOINTMENT (OUTPATIENT)
Dept: UROLOGY | Facility: CLINIC | Age: 60
End: 2020-07-02
Payer: COMMERCIAL

## 2020-07-02 VITALS
HEIGHT: 64 IN | RESPIRATION RATE: 14 BRPM | SYSTOLIC BLOOD PRESSURE: 143 MMHG | BODY MASS INDEX: 26.29 KG/M2 | TEMPERATURE: 98.2 F | WEIGHT: 154 LBS | DIASTOLIC BLOOD PRESSURE: 76 MMHG | HEART RATE: 86 BPM

## 2020-07-02 DIAGNOSIS — N50.811 RIGHT TESTICULAR PAIN: ICD-10-CM

## 2020-07-02 PROCEDURE — 76870 US EXAM SCROTUM: CPT | Mod: 26

## 2020-07-02 PROCEDURE — 93975 VASCULAR STUDY: CPT | Mod: 26

## 2020-07-02 PROCEDURE — 76870 US EXAM SCROTUM: CPT

## 2020-07-02 PROCEDURE — 99214 OFFICE O/P EST MOD 30 MIN: CPT

## 2020-07-02 PROCEDURE — 93975 VASCULAR STUDY: CPT

## 2020-07-03 LAB
APPEARANCE: ABNORMAL
BILIRUBIN URINE: NEGATIVE
BLOOD URINE: NEGATIVE
COLOR: NORMAL
GLUCOSE QUALITATIVE U: NEGATIVE
KETONES URINE: NEGATIVE
LEUKOCYTE ESTERASE URINE: NEGATIVE
NITRITE URINE: NEGATIVE
PH URINE: 7
PROTEIN URINE: NEGATIVE
SPECIFIC GRAVITY URINE: 1.02
UROBILINOGEN URINE: NORMAL

## 2020-07-13 NOTE — PHYSICAL EXAM
[Urethral Meatus] : meatus normal [Penis Abnormality] : normal circumcised penis [Epididymis] : the epididymides were normal [Testes Tenderness] : no tenderness of the testes [Scrotum] : the scrotum was normal [No Prostate Nodules] : no prostate nodules [Prostate Tenderness] : the prostate was not tender [Testes Mass (___cm)] : there were no testicular masses [Prostate Size ___ gm] : prostate size [unfilled] gm [General Appearance - Well Developed] : well developed [Normal Appearance] : normal appearance [General Appearance - In No Acute Distress] : no acute distress [Abdomen Soft] : soft [Abdomen Mass (___ Cm)] : no abdominal mass palpated [Abdomen Tenderness] : non-tender [Costovertebral Angle Tenderness] : no ~M costovertebral angle tenderness [FreeTextEntry1] : normal peripheral circulation [Skin Color & Pigmentation] : normal skin color and pigmentation [Oriented To Time, Place, And Person] : oriented to person, place, and time [] : no respiratory distress [Normal Station and Gait] : the gait and station were normal for the patient's age [No Focal Deficits] : no focal deficits

## 2020-07-13 NOTE — HISTORY OF PRESENT ILLNESS
[FreeTextEntry1] : 61 yo male presents for testis pain\par Patient primarily Japanese speaking, with limited English. Visit conducted with help of accompanying Son in law who was translating. \par Had unprotected sex in February 2020 and since then has off and on dysuria and right testis pain.\par Saw Primary care physician at that point had labs but has persistent symptoms. \par Denies any difficulty with urination. Urinates with good flow, every few hours during the day and nocturia 1-2 x. \par Denies hematuria, lower abdominal or flank pain, fever, chills or rigors.\par \par Status post Transurethral resection of prostate- Bipolar done 3/12/19 at [facility]. \par Rated erections as 3/5. Reports normal libido. Complained of retrograde ejaculation. \par \par \par \par

## 2020-07-13 NOTE — ASSESSMENT
[FreeTextEntry1] : Testis pain:\par \par Will get Urinalysis with reflex Urine culture. \par Scrotal Ultrasound. \par Will inform results. \par \par Return to office for scheduled appointment or sooner if any issues.

## 2020-09-17 ENCOUNTER — APPOINTMENT (OUTPATIENT)
Dept: INTERNAL MEDICINE | Facility: CLINIC | Age: 60
End: 2020-09-17
Payer: COMMERCIAL

## 2020-09-17 VITALS — RESPIRATION RATE: 12 BRPM | DIASTOLIC BLOOD PRESSURE: 78 MMHG | HEART RATE: 76 BPM | SYSTOLIC BLOOD PRESSURE: 130 MMHG

## 2020-09-17 DIAGNOSIS — Z23 ENCOUNTER FOR IMMUNIZATION: ICD-10-CM

## 2020-09-17 DIAGNOSIS — T23.209A BURN OF SECOND DEGREE OF UNSPECIFIED HAND, UNSPECIFIED SITE, INITIAL ENCOUNTER: ICD-10-CM

## 2020-09-17 PROCEDURE — 90715 TDAP VACCINE 7 YRS/> IM: CPT

## 2020-09-17 PROCEDURE — 90471 IMMUNIZATION ADMIN: CPT

## 2020-09-17 PROCEDURE — 99202 OFFICE O/P NEW SF 15 MIN: CPT | Mod: 25
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
## 2020-09-17 NOTE — PHYSICAL EXAM
[No Acute Distress] : no acute distress [Well Nourished] : well nourished [Well Developed] : well developed [Well-Appearing] : well-appearing [Normal Sclera/Conjunctiva] : normal sclera/conjunctiva [PERRL] : pupils equal round and reactive to light [EOMI] : extraocular movements intact [Normal Outer Ear/Nose] : the outer ears and nose were normal in appearance [Normal Oropharynx] : the oropharynx was normal [No JVD] : no jugular venous distention [No Lymphadenopathy] : no lymphadenopathy [Supple] : supple [Thyroid Normal, No Nodules] : the thyroid was normal and there were no nodules present [No Respiratory Distress] : no respiratory distress  [No Accessory Muscle Use] : no accessory muscle use [Clear to Auscultation] : lungs were clear to auscultation bilaterally [Normal Rate] : normal rate  [Regular Rhythm] : with a regular rhythm [Normal S1, S2] : normal S1 and S2 [No Murmur] : no murmur heard [No Carotid Bruits] : no carotid bruits [No Abdominal Bruit] : a ~M bruit was not heard ~T in the abdomen [No Varicosities] : no varicosities [Pedal Pulses Present] : the pedal pulses are present [No Edema] : there was no peripheral edema [No Palpable Aorta] : no palpable aorta [No Extremity Clubbing/Cyanosis] : no extremity clubbing/cyanosis [Soft] : abdomen soft [Non Tender] : non-tender [Non-distended] : non-distended [No Masses] : no abdominal mass palpated [No HSM] : no HSM [Normal Bowel Sounds] : normal bowel sounds [Normal Posterior Cervical Nodes] : no posterior cervical lymphadenopathy [Normal Anterior Cervical Nodes] : no anterior cervical lymphadenopathy [No CVA Tenderness] : no CVA  tenderness [No Spinal Tenderness] : no spinal tenderness [No Joint Swelling] : no joint swelling [Grossly Normal Strength/Tone] : grossly normal strength/tone [No Rash] : no rash [Coordination Grossly Intact] : coordination grossly intact [No Focal Deficits] : no focal deficits [Normal Gait] : normal gait [Normal Affect] : the affect was normal [Normal Insight/Judgement] : insight and judgment were intact [de-identified] : rgiht second degree burn right thumb and hand adjacent to index finger, no pus no drainage mostly dry but tender borders ok

## 2020-12-15 ENCOUNTER — APPOINTMENT (OUTPATIENT)
Dept: UROLOGY | Facility: CLINIC | Age: 60
End: 2020-12-15

## 2020-12-21 NOTE — PATIENT PROFILE ADULT - FUNCTIONAL SCREEN CURRENT LEVEL: EATING, MLM
Department of Podiatry  Resident Progress Note     Paul Bloom  Allergies: Sulfa antibiotics     SUBJECTIVE  The patient is a 60 y. o. female who presents to [facility] in [place] outpatient podiatry clinic for follow up evaluation of wound to right foot. Patient reports she has been feeling well this week, but that her right foot started hurting 2 days ago and she is worried that something may be brewing. Patient's daughter reports her mother's blood sugar was 93 mg/dL yesterday. Her dressings have been changed every other day with tiffany, Betadine, DSD, ACE, b/l as instructed. She has been nonweight bearing to bilateral extremities as directed. Denies nausea, vomiting, fever, chills, shortness of breath, or chest pain.      Past Medical History:    Past Medical History             Diagnosis Date    Amenorrhea      Anomalies of nails      Asthma 5/14/04    Athlete's foot 8/2010    Bacterial vaginosis 04/2008    Carpal tunnel syndrome 5/2007    COPD [facility]chronic obstructive [facility] disease) [facility] [address]      Diabetes mellitus type II 08/2007    Diabetic neuropathy ([facility] [address] 8/10    DVT (deep venous thrombosis) ([facility] [address] 3/2004    Dyslipidemia 5/2009    Dyspareunia 05/2009    ETOH abuse 3/04/2007    Feet clawing      HTN (hypertension)      Hx of blood clots      Hyperlipidemia      MRSA (methicillin resistant staph aureus) culture positive 11/06/2017; 11/17/2017     foot; leg     Neuropathy 05/2009     polyneuropathy    Pain, back 04/2008    Pain, eye, right 5/14/04    Pancreatitis 5/14/04    Pseudocyst, pancreas 5/14/04    Scalp lesion 08/2007    Tinea pedis      Tobacco abuse 03/2008    Vaginal bleeding, abnormal 6/2007    Wears dentures             Review of Systems: As above     OBJECTIVE 2. Full thickness ulceration, sub 1st metatarsal head; left foot- Carpenter 1  3. Onychomycosis x 4, Left  4. Macerated webspaces x 3: Left foot  5. Diabetes Mellitus Type II uncontrolled with peripheral neuropathy  6. History of non-compliance with weightbearing status and dressing changes    PLAN  -Evaluation and management x 30 minutes and greater than 50% of the time spent explaining the etiology and treatment with the patient.   -Using a #15 blade, I excisionally debrided the necrotic and nonviable tissue right foot wound down to and including subcutaneous tissue. Less than 3cc of bleeding noted. Hemostasis achieved with direct pressure. Patient tolerated well. -Right lower extremity dressing: Modesta, DSD, and Ace bandage. Patient to apply Modesta at home with dressing changes   -Nails 2 through 5 left foot sharply debrided with sterile nail nippers without incident.  -Left lower extremity dressing: Betadine, DSD, Ace bandage  -Instructed patient to continue to perform dressing changes every other day b/l LE consisting of the dressings described above and Betadine to webspaces as seen above. Patient understood.  -Patient instructed to be continued strict nonweightbearing to b/l lower extremities in CAM boots. Patient understood  -Prescription for doxycycline printed and handed to patient.  -Instructed patient to take doxycycline as directed through entirety of prescription.  -Instructed patient on proper diabetic foot education including but not limited to: Having a tight glycemic index whether through food intake or exercise, checking blood sugars daily, inspecting feet daily, and never walking barefoot.  -Educated patient on signs symptoms of local and systemic infection including but not limited to to increase redness, tenderness, purulent drainage, malodor, nausea, vomiting, fever, chills.  -Patient is to return to [facility] outpatient [facility]iatry clinic in 1 week. 0 = independent

## 2021-02-18 NOTE — H&P ADULT - REASON FOR ADMISSION
ED Time Seen By Provider Entered On:  9/16/2017 16:01     Performed On:  9/16/2017 16:01  by Mahin Wan PA-C      Time Seen By Provider   Time Seen by Provider :   9/16/2017 16:01    Mahin Wan PA-C. - 9/16/2017 16:01            called back for bacteremia

## 2021-03-19 ENCOUNTER — OUTPATIENT (OUTPATIENT)
Dept: OUTPATIENT SERVICES | Facility: HOSPITAL | Age: 61
LOS: 1 days | End: 2021-03-19
Payer: COMMERCIAL

## 2021-03-19 ENCOUNTER — APPOINTMENT (OUTPATIENT)
Dept: INTERNAL MEDICINE | Facility: CLINIC | Age: 61
End: 2021-03-19
Payer: COMMERCIAL

## 2021-03-19 ENCOUNTER — APPOINTMENT (OUTPATIENT)
Dept: RADIOLOGY | Facility: CLINIC | Age: 61
End: 2021-03-19
Payer: COMMERCIAL

## 2021-03-19 VITALS — DIASTOLIC BLOOD PRESSURE: 82 MMHG | HEART RATE: 82 BPM | SYSTOLIC BLOOD PRESSURE: 118 MMHG | RESPIRATION RATE: 12 BRPM

## 2021-03-19 VITALS — WEIGHT: 159 LBS | BODY MASS INDEX: 27.29 KG/M2

## 2021-03-19 DIAGNOSIS — R97.20 ELEVATED PROSTATE, SPECIFIC ANTIGEN [PSA]: ICD-10-CM

## 2021-03-19 DIAGNOSIS — R07.81 PLEURODYNIA: ICD-10-CM

## 2021-03-19 PROCEDURE — 99214 OFFICE O/P EST MOD 30 MIN: CPT | Mod: 25

## 2021-03-19 PROCEDURE — 99072 ADDL SUPL MATRL&STAF TM PHE: CPT

## 2021-03-19 PROCEDURE — 71100 X-RAY EXAM RIBS UNI 2 VIEWS: CPT | Mod: 26,LT

## 2021-03-19 PROCEDURE — 36415 COLL VENOUS BLD VENIPUNCTURE: CPT

## 2021-03-19 PROCEDURE — 71100 X-RAY EXAM RIBS UNI 2 VIEWS: CPT

## 2021-03-19 RX ORDER — CYCLOBENZAPRINE HYDROCHLORIDE 10 MG/1
10 TABLET, FILM COATED ORAL
Qty: 30 | Refills: 1 | Status: DISCONTINUED | COMMUNITY
Start: 2020-03-18 | End: 2021-03-19

## 2021-03-19 RX ORDER — TADALAFIL 20 MG/1
20 TABLET ORAL DAILY
Qty: 10 | Refills: 5 | Status: DISCONTINUED | COMMUNITY
Start: 2019-06-20 | End: 2021-03-19

## 2021-03-19 RX ORDER — DOXYCYCLINE HYCLATE 100 MG/1
100 TABLET ORAL
Qty: 14 | Refills: 0 | Status: DISCONTINUED | COMMUNITY
Start: 2020-03-18 | End: 2021-03-19

## 2021-03-19 RX ORDER — METHYLPREDNISOLONE 4 MG/1
4 TABLET ORAL
Qty: 1 | Refills: 0 | Status: DISCONTINUED | COMMUNITY
Start: 2020-05-19 | End: 2021-03-19

## 2021-03-19 RX ORDER — TRIAMCINOLONE ACETONIDE 1 MG/G
0.1 CREAM TOPICAL TWICE DAILY
Qty: 45 | Refills: 4 | Status: DISCONTINUED | COMMUNITY
Start: 2018-08-06 | End: 2021-03-19

## 2021-03-19 RX ORDER — AMMONIUM LACTATE 12 %
12 CREAM (GRAM) TOPICAL TWICE DAILY
Qty: 1 | Refills: 4 | Status: DISCONTINUED | COMMUNITY
Start: 2019-05-10 | End: 2021-03-19

## 2021-03-19 RX ORDER — OXYCODONE 5 MG/1
5 TABLET ORAL 3 TIMES DAILY
Qty: 21 | Refills: 0 | Status: DISCONTINUED | COMMUNITY
Start: 2019-06-06 | End: 2021-03-19

## 2021-03-19 RX ORDER — PHENAZOPYRIDINE HYDROCHLORIDE 100 MG/1
100 TABLET ORAL 3 TIMES DAILY
Qty: 9 | Refills: 0 | Status: DISCONTINUED | COMMUNITY
Start: 2019-03-22 | End: 2021-03-19

## 2021-03-19 RX ORDER — METHYLPREDNISOLONE 4 MG/1
4 TABLET ORAL
Qty: 1 | Refills: 0 | Status: DISCONTINUED | COMMUNITY
Start: 2019-05-31 | End: 2021-03-19

## 2021-03-19 RX ORDER — PHENAZOPYRIDINE 200 MG/1
200 TABLET, FILM COATED ORAL 3 TIMES DAILY
Qty: 30 | Refills: 0 | Status: DISCONTINUED | COMMUNITY
Start: 2019-05-10 | End: 2021-03-19

## 2021-03-19 RX ORDER — TRAMADOL HYDROCHLORIDE 50 MG/1
50 TABLET, COATED ORAL
Qty: 20 | Refills: 0 | Status: DISCONTINUED | COMMUNITY
Start: 2019-05-10 | End: 2021-03-19

## 2021-03-19 RX ORDER — SILVER SULFADIAZINE 10 MG/G
1 CREAM TOPICAL TWICE DAILY
Qty: 1 | Refills: 2 | Status: DISCONTINUED | COMMUNITY
Start: 2020-09-17 | End: 2021-03-19

## 2021-03-19 NOTE — HISTORY OF PRESENT ILLNESS
[FreeTextEntry1] : follow up asthma, bph\par left rib pain [de-identified] : follow up asthma, bph\par has not been using his inhaler\par had covid in February, is fine now\par wants covid vaccine\par he feels urine burns sometimes after prostate surgery\par he has left rib discomfort but no chest pains \par no cough no wheezing hurts with movement

## 2021-03-19 NOTE — ASSESSMENT
[FreeTextEntry1] : left rib pain tizanidine\par asthma stable\par check labs\par check psa\par follow up prn\par \par

## 2021-03-20 LAB
25(OH)D3 SERPL-MCNC: 25.2 NG/ML
ALBUMIN SERPL ELPH-MCNC: 4.3 G/DL
ALP BLD-CCNC: 103 U/L
ALT SERPL-CCNC: 32 U/L
ANION GAP SERPL CALC-SCNC: 9 MMOL/L
APPEARANCE: CLEAR
AST SERPL-CCNC: 31 U/L
BASOPHILS # BLD AUTO: 0.03 K/UL
BASOPHILS NFR BLD AUTO: 0.8 %
BILIRUB SERPL-MCNC: 0.2 MG/DL
BILIRUBIN URINE: NEGATIVE
BLOOD URINE: NEGATIVE
BUN SERPL-MCNC: 15 MG/DL
CALCIUM SERPL-MCNC: 9.5 MG/DL
CHLORIDE SERPL-SCNC: 107 MMOL/L
CHOLEST SERPL-MCNC: 231 MG/DL
CO2 SERPL-SCNC: 25 MMOL/L
COLOR: NORMAL
CREAT SERPL-MCNC: 0.8 MG/DL
CREAT SPEC-SCNC: 103 MG/DL
EOSINOPHIL # BLD AUTO: 0.11 K/UL
EOSINOPHIL NFR BLD AUTO: 3 %
ESTIMATED AVERAGE GLUCOSE: 126 MG/DL
GLUCOSE QUALITATIVE U: NEGATIVE
GLUCOSE SERPL-MCNC: 97 MG/DL
HBA1C MFR BLD HPLC: 6 %
HCT VFR BLD CALC: 38.8 %
HDLC SERPL-MCNC: 45 MG/DL
HGB BLD-MCNC: 12.5 G/DL
IMM GRANULOCYTES NFR BLD AUTO: 0 %
KETONES URINE: NEGATIVE
LDLC SERPL CALC-MCNC: 166 MG/DL
LEUKOCYTE ESTERASE URINE: NEGATIVE
LYMPHOCYTES # BLD AUTO: 2.02 K/UL
LYMPHOCYTES NFR BLD AUTO: 54.7 %
MAN DIFF?: NORMAL
MCHC RBC-ENTMCNC: 28.9 PG
MCHC RBC-ENTMCNC: 32.2 GM/DL
MCV RBC AUTO: 89.6 FL
MICROALBUMIN 24H UR DL<=1MG/L-MCNC: <1.2 MG/DL
MICROALBUMIN/CREAT 24H UR-RTO: NORMAL MG/G
MONOCYTES # BLD AUTO: 0.5 K/UL
MONOCYTES NFR BLD AUTO: 13.6 %
NEUTROPHILS # BLD AUTO: 1.03 K/UL
NEUTROPHILS NFR BLD AUTO: 27.9 %
NITRITE URINE: NEGATIVE
NONHDLC SERPL-MCNC: 186 MG/DL
PH URINE: 7
PLATELET # BLD AUTO: 268 K/UL
POTASSIUM SERPL-SCNC: 4.7 MMOL/L
PROT SERPL-MCNC: 7.4 G/DL
PROTEIN URINE: NEGATIVE
PSA SERPL-MCNC: 0.82 NG/ML
RBC # BLD: 4.33 M/UL
RBC # FLD: 14.5 %
SODIUM SERPL-SCNC: 141 MMOL/L
SPECIFIC GRAVITY URINE: 1.02
T4 FREE SERPL-MCNC: 1.3 NG/DL
TRIGL SERPL-MCNC: 100 MG/DL
TSH SERPL-ACNC: 1.34 UIU/ML
UROBILINOGEN URINE: NORMAL
WBC # FLD AUTO: 3.69 K/UL

## 2021-03-22 ENCOUNTER — NON-APPOINTMENT (OUTPATIENT)
Age: 61
End: 2021-03-22

## 2021-03-24 ENCOUNTER — NON-APPOINTMENT (OUTPATIENT)
Age: 61
End: 2021-03-24

## 2021-03-26 ENCOUNTER — NON-APPOINTMENT (OUTPATIENT)
Age: 61
End: 2021-03-26

## 2021-04-05 ENCOUNTER — NON-APPOINTMENT (OUTPATIENT)
Age: 61
End: 2021-04-05

## 2021-04-30 ENCOUNTER — TRANSCRIPTION ENCOUNTER (OUTPATIENT)
Age: 61
End: 2021-04-30

## 2021-05-06 ENCOUNTER — APPOINTMENT (OUTPATIENT)
Dept: ORTHOPEDIC SURGERY | Facility: CLINIC | Age: 61
End: 2021-05-06
Payer: COMMERCIAL

## 2021-05-06 DIAGNOSIS — S80.01XA CONTUSION OF RIGHT KNEE, INITIAL ENCOUNTER: ICD-10-CM

## 2021-05-06 PROCEDURE — 99072 ADDL SUPL MATRL&STAF TM PHE: CPT

## 2021-05-06 PROCEDURE — 99214 OFFICE O/P EST MOD 30 MIN: CPT

## 2021-05-06 NOTE — ASSESSMENT
[FreeTextEntry1] : Patient with a contusion of the patella. The nature of this condition was described at length with the patient and he verbalized understanding. All translation was done by the patient's daughter from English to Guyanese. I recommend ibuprofen 800mg and giving it time to improve. The patient verbalized understanding and agreement with this plan.

## 2021-05-06 NOTE — PHYSICAL EXAM
[UE/LE] : Sensory: Intact in bilateral upper & lower extremities [ALL] : dorsalis pedis, posterior tibial, femoral, popliteal, and radial 2+ and symmetric bilaterally [Normal Finger/nose] : finger to nose coordination [Normal] : no peripheral adenopathy appreciated [Poor Appearance] : well-appearing [Acute Distress] : not in acute distress [de-identified] : Right Knee Pain\par FROM to hip\par \par Skin Warm, Dry, no erythema, no lesions \par \par Knee \par stable\par anatomic alignment\par ROM 0-130\par Valgus/Varus Stress intact \par Effusion - Negative\par Crepitus - Negative \par Pain with direct palpation of the center of the patella\par Patella Grind - Negative \par Patella Apprehension - Negative \par Medial joint line tenderness - Negative\par Lateral Joint line tenderness - Negative\par Rena Test - Negative  \par Lachman test - Negative \par Anterior Drawer Test -  Negative \par \par Distal Motor Function intact \par Sensation intact to light touch bilaterally \par Pulses 2+ bilaterally\par \par  [de-identified] : SELENER [de-identified] : 3 xray views of the right knee were reviewed from an outside institution. No fractures or dislocations noted. There is a small radioopacity in the posterior of the knee that is incidental in nature. No foreign bodies noted near the patella.

## 2021-11-20 NOTE — ASSESSMENT
[FreeTextEntry1] : try pyridium to coat the bladder\par check u/a anr urine cuture\par reassured patient its a process to recovery and he has to be patient\par needs to see colorectal for hemorrhoids\par lachydrin for dry skin oral

## 2022-11-11 ENCOUNTER — NON-APPOINTMENT (OUTPATIENT)
Age: 62
End: 2022-11-11

## 2022-11-11 ENCOUNTER — APPOINTMENT (OUTPATIENT)
Dept: INTERNAL MEDICINE | Facility: CLINIC | Age: 62
End: 2022-11-11

## 2022-11-11 VITALS — BODY MASS INDEX: 26.61 KG/M2 | WEIGHT: 155 LBS

## 2022-11-11 VITALS — RESPIRATION RATE: 12 BRPM | DIASTOLIC BLOOD PRESSURE: 82 MMHG | HEART RATE: 76 BPM | SYSTOLIC BLOOD PRESSURE: 118 MMHG

## 2022-11-11 DIAGNOSIS — N50.819 TESTICULAR PAIN, UNSPECIFIED: ICD-10-CM

## 2022-11-11 DIAGNOSIS — A64 UNSPECIFIED SEXUALLY TRANSMITTED DISEASE: ICD-10-CM

## 2022-11-11 DIAGNOSIS — Z23 ENCOUNTER FOR IMMUNIZATION: ICD-10-CM

## 2022-11-11 PROCEDURE — 90472 IMMUNIZATION ADMIN EACH ADD: CPT

## 2022-11-11 PROCEDURE — 99213 OFFICE O/P EST LOW 20 MIN: CPT | Mod: 25

## 2022-11-11 PROCEDURE — 93000 ELECTROCARDIOGRAM COMPLETE: CPT

## 2022-11-11 PROCEDURE — 36415 COLL VENOUS BLD VENIPUNCTURE: CPT

## 2022-11-11 PROCEDURE — 90686 IIV4 VACC NO PRSV 0.5 ML IM: CPT

## 2022-11-11 PROCEDURE — G0009: CPT

## 2022-11-11 PROCEDURE — 99396 PREV VISIT EST AGE 40-64: CPT | Mod: 25

## 2022-11-11 PROCEDURE — 90677 PCV20 VACCINE IM: CPT

## 2022-11-11 RX ORDER — TAMSULOSIN HYDROCHLORIDE 0.4 MG/1
0.4 CAPSULE ORAL
Qty: 180 | Refills: 3 | Status: DISCONTINUED | COMMUNITY
Start: 2017-03-17 | End: 2022-11-11

## 2022-11-11 RX ORDER — FLUTICASONE FUROATE AND VILANTEROL TRIFENATATE 100; 25 UG/1; UG/1
100-25 POWDER RESPIRATORY (INHALATION) DAILY
Qty: 1 | Refills: 5 | Status: DISCONTINUED | COMMUNITY
Start: 2017-10-13 | End: 2022-11-11

## 2022-11-11 RX ORDER — TIZANIDINE 2 MG/1
2 TABLET ORAL
Qty: 30 | Refills: 3 | Status: DISCONTINUED | COMMUNITY
Start: 2021-03-19 | End: 2022-11-11

## 2022-11-11 RX ORDER — FINASTERIDE 5 MG/1
5 TABLET, FILM COATED ORAL
Qty: 90 | Refills: 3 | Status: DISCONTINUED | COMMUNITY
Start: 2017-08-31 | End: 2022-11-11

## 2022-11-11 RX ORDER — IBUPROFEN 800 MG/1
800 TABLET, FILM COATED ORAL
Qty: 90 | Refills: 0 | Status: DISCONTINUED | COMMUNITY
Start: 2021-05-06 | End: 2022-11-11

## 2022-11-11 NOTE — ASSESSMENT
[FreeTextEntry1] : refer for sono due to testicular pain \par see cardio for atypical chest pain ekg non specific\par bp stable\par asthma restart meds\par flu and prevnar given

## 2022-11-11 NOTE — HEALTH RISK ASSESSMENT
[Good] : ~his/her~  mood as  good [Never] : Never [Yes] : Yes [No falls in past year] : Patient reported no falls in the past year [0] : 2) Feeling down, depressed, or hopeless: Not at all (0) [PHQ-2 Negative - No further assessment needed] : PHQ-2 Negative - No further assessment needed [HIV Test offered] : HIV Test offered [Hepatitis C test offered] : Hepatitis C test offered [Change in mental status noted] : No change in mental status noted [Language] : denies difficulty with language [Behavior] : denies difficulty with behavior [Learning/Retaining New Information] : denies difficulty learning/retaining new information [Handling Complex Tasks] : denies difficulty handling complex tasks [Reasoning] : denies difficulty with reasoning [None] : None [With Significant Other] : lives with significant other [High School] : high school [] :  [Sexually Active] : sexually active [Feels Safe at Home] : Feels safe at home [Fully functional (bathing, dressing, toileting, transferring, walking, feeding)] : Fully functional (bathing, dressing, toileting, transferring, walking, feeding) [Fully functional (using the telephone, shopping, preparing meals, housekeeping, doing laundry, using] : Fully functional and needs no help or supervision to perform IADLs (using the telephone, shopping, preparing meals, housekeeping, doing laundry, using transportation, managing medications and managing finances) [Reports changes in hearing] : Reports no changes in hearing [Reports changes in vision] : Reports no changes in vision [Reports normal functional visual acuity (ie: able to read med bottle)] : Reports poor functional visual acuity.  [Reports changes in dental health] : Reports no changes in dental health [Smoke Detector] : smoke detector [Carbon Monoxide Detector] : carbon monoxide detector [Guns at Home] : no guns at home [Safety elements used in home] : safety elements used in home [Seat Belt] :  uses seat belt [Sunscreen] : does not use sunscreen [Travel to Developing Areas] : does not  travel to developing areas [TB Exposure] : is not being exposed to tuberculosis [Caregiver Concerns] : does not have caregiver concerns [ColonoscopyDate] : 2019 [AdvancecareDate] : 11/11/22

## 2022-11-11 NOTE — HISTORY OF PRESENT ILLNESS
[FreeTextEntry1] : For CPE [de-identified] : For CPE\par had atypical chest pain \par had it last night\par he has asthma\par has no nvd or palpitations\par wants test fo syphilis and GC

## 2022-11-12 LAB
ALBUMIN SERPL ELPH-MCNC: 4.4 G/DL
ALP BLD-CCNC: 100 U/L
ALT SERPL-CCNC: 12 U/L
ANION GAP SERPL CALC-SCNC: 10 MMOL/L
APPEARANCE: CLEAR
AST SERPL-CCNC: 15 U/L
BASOPHILS # BLD AUTO: 0.05 K/UL
BASOPHILS NFR BLD AUTO: 0.9 %
BILIRUB SERPL-MCNC: 0.2 MG/DL
BILIRUBIN URINE: NEGATIVE
BLOOD URINE: NEGATIVE
BUN SERPL-MCNC: 15 MG/DL
CALCIUM SERPL-MCNC: 9.3 MG/DL
CHLORIDE SERPL-SCNC: 104 MMOL/L
CHOLEST SERPL-MCNC: 255 MG/DL
CO2 SERPL-SCNC: 23 MMOL/L
COLOR: NORMAL
CREAT SERPL-MCNC: 0.89 MG/DL
CREAT SPEC-SCNC: 116 MG/DL
EGFR: 96 ML/MIN/1.73M2
EOSINOPHIL # BLD AUTO: 0.28 K/UL
EOSINOPHIL NFR BLD AUTO: 5.1 %
ESTIMATED AVERAGE GLUCOSE: 123 MG/DL
GLUCOSE QUALITATIVE U: NEGATIVE
GLUCOSE SERPL-MCNC: 120 MG/DL
HBA1C MFR BLD HPLC: 5.9 %
HCT VFR BLD CALC: 37.4 %
HDLC SERPL-MCNC: 44 MG/DL
HGB BLD-MCNC: 12.4 G/DL
IMM GRANULOCYTES NFR BLD AUTO: 0.2 %
KETONES URINE: NEGATIVE
LDLC SERPL CALC-MCNC: 172 MG/DL
LEUKOCYTE ESTERASE URINE: NEGATIVE
LYMPHOCYTES # BLD AUTO: 2.3 K/UL
LYMPHOCYTES NFR BLD AUTO: 42.2 %
MAN DIFF?: NORMAL
MCHC RBC-ENTMCNC: 30.2 PG
MCHC RBC-ENTMCNC: 33.2 GM/DL
MCV RBC AUTO: 91.2 FL
MICROALBUMIN 24H UR DL<=1MG/L-MCNC: <1.2 MG/DL
MICROALBUMIN/CREAT 24H UR-RTO: NORMAL MG/G
MONOCYTES # BLD AUTO: 0.51 K/UL
MONOCYTES NFR BLD AUTO: 9.4 %
NEUTROPHILS # BLD AUTO: 2.3 K/UL
NEUTROPHILS NFR BLD AUTO: 42.2 %
NITRITE URINE: NEGATIVE
NONHDLC SERPL-MCNC: 211 MG/DL
PH URINE: 7.5
PLATELET # BLD AUTO: 243 K/UL
POTASSIUM SERPL-SCNC: 4.2 MMOL/L
PROT SERPL-MCNC: 7 G/DL
PROTEIN URINE: NORMAL
PSA SERPL-MCNC: 1.32 NG/ML
RBC # BLD: 4.1 M/UL
RBC # FLD: 14.2 %
SODIUM SERPL-SCNC: 138 MMOL/L
SPECIFIC GRAVITY URINE: 1.02
T PALLIDUM AB SER QL IA: NEGATIVE
T4 FREE SERPL-MCNC: 1.3 NG/DL
TRIGL SERPL-MCNC: 196 MG/DL
TSH SERPL-ACNC: 1.42 UIU/ML
UROBILINOGEN URINE: NORMAL
WBC # FLD AUTO: 5.45 K/UL

## 2022-11-13 LAB
C TRACH RRNA SPEC QL NAA+PROBE: NOT DETECTED
HIV1+2 AB SPEC QL IA.RAPID: NONREACTIVE
N GONORRHOEA RRNA SPEC QL NAA+PROBE: NOT DETECTED
SOURCE AMPLIFICATION: NORMAL

## 2022-11-21 ENCOUNTER — NON-APPOINTMENT (OUTPATIENT)
Age: 62
End: 2022-11-21

## 2023-02-17 NOTE — ED ADULT NURSE NOTE - IN THE PAST YEAR, HOW OFTEN HAVE YOU USED TOBACCO PRODUCTS?
Received request via: Pharmacy    Was the patient seen in the last year in this department? Yes    Does the patient have an active prescription (recently filled or refills available) for medication(s) requested? No    Does the patient have prison Plus and need 100 day supply (blood pressure, diabetes and cholesterol meds only)? Patient does not have SCP   Never

## 2023-06-28 NOTE — PATIENT PROFILE ADULT - STATED REASON FOR ADMISSION
See message below, order pended and routing to PCP.    Ashlie Vila RN  [facility]   + blood cultures

## 2023-06-30 ENCOUNTER — LABORATORY RESULT (OUTPATIENT)
Age: 63
End: 2023-06-30

## 2023-06-30 ENCOUNTER — APPOINTMENT (OUTPATIENT)
Dept: UROLOGY | Facility: CLINIC | Age: 63
End: 2023-06-30
Payer: MEDICAID

## 2023-06-30 VITALS
RESPIRATION RATE: 16 BRPM | HEIGHT: 66 IN | HEART RATE: 77 BPM | SYSTOLIC BLOOD PRESSURE: 144 MMHG | OXYGEN SATURATION: 98 % | BODY MASS INDEX: 25.71 KG/M2 | DIASTOLIC BLOOD PRESSURE: 78 MMHG | WEIGHT: 160 LBS

## 2023-06-30 DIAGNOSIS — Z11.3 ENCOUNTER FOR SCREENING FOR INFECTIONS WITH A PREDOMINANTLY SEXUAL MODE OF TRANSMISSION: ICD-10-CM

## 2023-06-30 DIAGNOSIS — I86.1 SCROTAL VARICES: ICD-10-CM

## 2023-06-30 PROCEDURE — 99214 OFFICE O/P EST MOD 30 MIN: CPT

## 2023-06-30 RX ORDER — CEFUROXIME AXETIL 500 MG/1
500 TABLET ORAL
Qty: 14 | Refills: 0 | Status: ACTIVE | COMMUNITY
Start: 2023-06-30 | End: 1900-01-01

## 2023-07-01 ENCOUNTER — RESULT REVIEW (OUTPATIENT)
Age: 63
End: 2023-07-01

## 2023-07-05 ENCOUNTER — OUTPATIENT (OUTPATIENT)
Dept: OUTPATIENT SERVICES | Facility: HOSPITAL | Age: 63
LOS: 1 days | End: 2023-07-05

## 2023-07-05 ENCOUNTER — APPOINTMENT (OUTPATIENT)
Dept: CT IMAGING | Facility: CLINIC | Age: 63
End: 2023-07-05
Payer: MEDICAID

## 2023-07-05 DIAGNOSIS — R10.30 LOWER ABDOMINAL PAIN, UNSPECIFIED: ICD-10-CM

## 2023-07-05 DIAGNOSIS — I86.1 SCROTAL VARICES: ICD-10-CM

## 2023-07-05 PROCEDURE — 74177 CT ABD & PELVIS W/CONTRAST: CPT | Mod: 26

## 2023-07-13 ENCOUNTER — APPOINTMENT (OUTPATIENT)
Dept: UROLOGY | Facility: CLINIC | Age: 63
End: 2023-07-13
Payer: MEDICAID

## 2023-07-13 VITALS
SYSTOLIC BLOOD PRESSURE: 148 MMHG | HEIGHT: 66 IN | RESPIRATION RATE: 16 BRPM | HEART RATE: 85 BPM | OXYGEN SATURATION: 98 % | DIASTOLIC BLOOD PRESSURE: 78 MMHG

## 2023-07-13 DIAGNOSIS — N48.89 OTHER SPECIFIED DISORDERS OF PENIS: ICD-10-CM

## 2023-07-13 DIAGNOSIS — N28.1 CYST OF KIDNEY, ACQUIRED: ICD-10-CM

## 2023-07-13 PROCEDURE — 99214 OFFICE O/P EST MOD 30 MIN: CPT

## 2023-07-13 NOTE — ASSESSMENT
[FreeTextEntry1] : Reviewed records.\par Discussed labs and imaging. \par \par Non genitourinary findings were discussed and asked Patient to discuss them with PCP and respective physicians. \par Patient was recommended to get a copy of the CT scan. \par \par Renal cyst:\par Discussed that Renal cysts are a common finding on routine radiological studies. Autopsy studies in patients over the age of 50 reveal greater than a 50% chance of having at least one simple renal cyst.\par And that renal cysts may be classified as simple or complex depending how they look on imaging. Discussed complexity of renal cysts and its implications as regards malignancy. \par  \par Lower abdominal pain:\par Discussed unlikely to be genitourinary.\par Discussed possibly secondary to gastrointestinal condition.  Discussed concern for colitis.\par Recommended MiraLAX/Metamucil.\par Recommended to see gastroenterologist.\par \par Penile pain:\par Discussed cystoscopy.  Discussed risks and benefits.  Patient would like to proceed.\par \par Return to office in 1 week or sooner if any issues. Will do Cystoscopy if persistent bother.

## 2023-07-13 NOTE — PHYSICAL EXAM
[Urethral Meatus] : meatus normal [Epididymis] : the epididymides were normal [Testes Tenderness] : no tenderness of the testes [Testes Mass (___cm)] : there were no testicular masses [Prostate Tenderness] : the prostate was not tender [No Prostate Nodules] : no prostate nodules [Prostate Size ___ (0-4)] : prostate size [unfilled] (scale: 0-4) [Normal Appearance] : normal appearance [General Appearance - In No Acute Distress] : no acute distress [Abdomen Soft] : soft [Abdomen Tenderness] : non-tender [] : no respiratory distress [Oriented To Time, Place, And Person] : oriented to person, place, and time [Normal Station and Gait] : the gait and station were normal for the patient's age [FreeTextEntry1] : normal peripheral circulation

## 2023-07-13 NOTE — ASSESSMENT
[FreeTextEntry1] : Benign Prostatic Hyperplasia:\par Status post Transurethral resection of prostate. \par No bothersome lower urinary tract symptoms and minimal post void residual. \par \par Lower abdominal pain:\par STD screening:\par Bilateral varicoceles:\par Will get Urinalysis, Urine culture and Urine cytology. \par Will get STD panel.\par Will get CT scan Abdomen and Pelvis with and without IV contrast and oral contrast.\par \par Return to office after CT scan.

## 2023-07-13 NOTE — HISTORY OF PRESENT ILLNESS
[FreeTextEntry1] : Patient primarily Japanese speaking, with limited English. Visit conducted with help of accompanying Son in law who was translating. NORMA Cramer\par \par 64 yo male presents for lower abdominal pain. \par Complaining of lower abdominal pain for years.\par Pain is 5/10.  Gets some relief from pain medication.\par Denies constipation.\par Had unprotected sex a few years ago in his [place] country and since then has been having this pain/discomfort.\par Has had negative STD panel multiple times.\par Denies any difficulty with urination. Urinates with good flow, 3 to 4 x during the day and nocturia 1 to 3 x. \par Denies hesitancy, straining, intermittency, urgency, incontinence, sense of incomplete emptying. \par Denies hematuria, lower abdominal or flank pain, fever, chills or rigors.\par Rates erections as 3/5. Reports normal libido.  Has retrograde ejaculation. \par \par Status post Transurethral resection of prostate: Bipolar done 3/12/19 at [facility]. \par

## 2023-07-13 NOTE — HISTORY OF PRESENT ILLNESS
[FreeTextEntry1] : Patient primarily Hungarian speaking, with limited English. \par Visit conducted with help of accompanying Son in law who was translating. \par \par 64 yo male presents for follow up. \par Continues to have lower abdominal pain, also complaining of penile pain. \par Per patient was better on antibiotics.\par Had CT scan. \par \par Sen on 6/30/23 for lower abdominal pain. \par Had been having lower abdominal pain for years.\par Pain is 5/10.  Gets some relief from pain medication.\par Denied constipation.\par Had unprotected sex a few years ago in his [place] country and since then has been having this pain/discomfort.\par Has had negative STD panel multiple times.\par Denied any difficulty with urination. Urinates with good flow, 3 to 4 x during the day and nocturia 1 to 3 x. \par Denied hesitancy, straining, intermittency, urgency, incontinence, sense of incomplete emptying. \par Denied hematuria, lower abdominal or flank pain, fever, chills or rigors.\par Rated erections as 3/5. Reports normal libido.  Has retrograde ejaculation. \par \par Status post Transurethral resection of prostate: Bipolar done 3/12/19 at [facility]. \par Pathology: \par Fibromuscular and adenomatous hyperplasia with thermal type artifact.\par Chronic prostatitis.

## 2023-07-14 LAB
APPEARANCE: CLEAR
BACTERIA UR CULT: NORMAL
BACTERIA: NEGATIVE /HPF
BILIRUBIN URINE: NEGATIVE
BLOOD URINE: NEGATIVE
C TRACH RRNA SPEC QL NAA+PROBE: NOT DETECTED
CAST: 0 /LPF
COLOR: YELLOW
EPITHELIAL CELLS: 0 /HPF
GLUCOSE QUALITATIVE U: NEGATIVE MG/DL
HIV1+2 AB SPEC QL IA.RAPID: NONREACTIVE
KETONES URINE: NEGATIVE MG/DL
LEUKOCYTE ESTERASE URINE: NEGATIVE
MICROSCOPIC-UA: NORMAL
N GONORRHOEA RRNA SPEC QL NAA+PROBE: NOT DETECTED
NITRITE URINE: NEGATIVE
PH URINE: 6.5
PROTEIN URINE: NEGATIVE MG/DL
RED BLOOD CELLS URINE: 0 /HPF
RPR SER-TITR: NORMAL
SOURCE AMPLIFICATION: NORMAL
SPECIFIC GRAVITY URINE: 1.02
UROBILINOGEN URINE: 0.2 MG/DL
WHITE BLOOD CELLS URINE: 0 /HPF

## 2023-07-18 ENCOUNTER — NON-APPOINTMENT (OUTPATIENT)
Age: 63
End: 2023-07-18

## 2023-07-25 ENCOUNTER — APPOINTMENT (OUTPATIENT)
Dept: UROLOGY | Facility: CLINIC | Age: 63
End: 2023-07-25

## 2023-07-26 ENCOUNTER — NON-APPOINTMENT (OUTPATIENT)
Age: 63
End: 2023-07-26

## 2023-07-27 ENCOUNTER — NON-APPOINTMENT (OUTPATIENT)
Age: 63
End: 2023-07-27

## 2023-08-22 ENCOUNTER — APPOINTMENT (OUTPATIENT)
Dept: COLORECTAL SURGERY | Facility: CLINIC | Age: 63
End: 2023-08-22
Payer: MEDICAID

## 2023-08-22 ENCOUNTER — NON-APPOINTMENT (OUTPATIENT)
Age: 63
End: 2023-08-22

## 2023-08-22 VITALS
BODY MASS INDEX: 25.71 KG/M2 | HEART RATE: 82 BPM | OXYGEN SATURATION: 98 % | RESPIRATION RATE: 15 BRPM | SYSTOLIC BLOOD PRESSURE: 143 MMHG | DIASTOLIC BLOOD PRESSURE: 77 MMHG | WEIGHT: 160 LBS | HEIGHT: 66 IN | TEMPERATURE: 97 F

## 2023-08-22 PROCEDURE — 99202 OFFICE O/P NEW SF 15 MIN: CPT

## 2023-08-22 NOTE — ASSESSMENT
[FreeTextEntry1] : 63 year old male who presents for discussion and scheduling of screening colonoscopy. He has been having changes in bowel habits. He also had a recent CT showing sigmoid thickening, possibly from chronic diverticulosis.  We discussed the following: Colonoscopy risks including bowel perforation requiring emergent operative intervention, bloating, discomfort, abdominal pain, cardiac events and or death; benefits including early detection of polyps and or colon and rectal cancer were discussed. Instructions regarding prep was discussed with the patient and hard copy was provided. Patient will be scheduled at his earliest connivence.

## 2023-08-22 NOTE — HISTORY OF PRESENT ILLNESS
[FreeTextEntry1] : 63 year old male who presents for discussion and scheduling of screening colonoscopy. Patient reports a change in bowel habits, he has been having thin, small caliber hard stools every 2 days. He denies anorectal bleeding, denies pain. Last colonoscopy reported in 2019

## 2023-08-22 NOTE — DATA REVIEWED
[FreeTextEntry1] : BOWEL: Sigmoid diverticulosis. Stable mild concentric wall thickening of the mid and distal sigmoid colon, which may represent muscular hypertrophy secondary to chronic diverticulosis. No bowel obstruction. Appendix is normal. PERITONEUM: No ascites. VESSELS: There is no evidence of extrinsic mass effect upon the IVC or pelvic veins. There is moderate atherosclerotic disease of the nonaneurysmal abdominal aorta. RETROPERITONEUM/LYMPH NODES: There is no retrocrural, retroperitoneal, pelvic, iliac chain, obturator, or inguinal adenopathy. ABDOMINAL WALL: Small fat-containing umbilical hernia. BONES: Within normal limits.  IMPRESSION: No evidence of lymphadenopathy or extrinsic mass effect upon the pelvic veins.

## 2023-09-04 NOTE — ASSESSMENT
[FreeTextEntry1] : has francisco\par has picc line for treatment through jan 28\par cannot work until prostate issue resolved with francisco and infection clears up\par asthma stable\par external hemorrhoids proctosol
Normal speech (IX, X, XII)

## 2023-09-05 ENCOUNTER — APPOINTMENT (OUTPATIENT)
Dept: INTERNAL MEDICINE | Facility: CLINIC | Age: 63
End: 2023-09-05
Payer: MEDICAID

## 2023-09-05 DIAGNOSIS — Z01.818 ENCOUNTER FOR OTHER PREPROCEDURAL EXAMINATION: ICD-10-CM

## 2023-09-05 DIAGNOSIS — R19.4 CHANGE IN BOWEL HABIT: ICD-10-CM

## 2023-09-05 PROCEDURE — 99214 OFFICE O/P EST MOD 30 MIN: CPT | Mod: 25

## 2023-09-05 NOTE — ASSESSMENT
[Patient Optimized for Surgery] : Patient optimized for surgery [No Further Testing Recommended] : no further testing recommended [FreeTextEntry4] : Patient medically stable for low risk procedure [FreeTextEntry7] : no nsaids prior to the procedure

## 2023-09-05 NOTE — HISTORY OF PRESENT ILLNESS
[No Pertinent Cardiac History] : no history of aortic stenosis, atrial fibrillation, coronary artery disease, recent myocardial infarction, or implantable device/pacemaker [Asthma] : asthma [No Adverse Anesthesia Reaction] : no adverse anesthesia reaction in self or family member [Good (7-10 METs)] : Good (7-10 METs) [Aortic Stenosis] : no aortic stenosis [Atrial Fibrillation] : no atrial fibrillation [Coronary Artery Disease] : no coronary artery disease [Recent Myocardial Infarction] : no recent myocardial infarction [COPD] : no COPD [Sleep Apnea] : no sleep apnea [Smoker] : not a smoker [Chronic Anticoagulation] : no chronic anticoagulation [Chronic Kidney Disease] : no chronic kidney disease [Diabetes] : no diabetes [FreeTextEntry1] : Colonoscopy [FreeTextEntry2] : 9/8/23 [FreeTextEntry3] : Dr. Greenwood [FreeTextEntry4] : History of Asthma, bph who will undergo colonoscopy. Has been suffering from constipation.  He has diverticulosis of his ct with some thickening of bowel wall.

## 2023-09-06 LAB
ALBUMIN SERPL ELPH-MCNC: 4.5 G/DL
ALP BLD-CCNC: 99 U/L
ALT SERPL-CCNC: 22 U/L
ANION GAP SERPL CALC-SCNC: 10 MMOL/L
AST SERPL-CCNC: 18 U/L
BASOPHILS # BLD AUTO: 0.03 K/UL
BASOPHILS NFR BLD AUTO: 0.6 %
BILIRUB SERPL-MCNC: 0.2 MG/DL
BUN SERPL-MCNC: 12 MG/DL
CALCIUM SERPL-MCNC: 9.8 MG/DL
CHLORIDE SERPL-SCNC: 104 MMOL/L
CO2 SERPL-SCNC: 27 MMOL/L
CREAT SERPL-MCNC: 1.2 MG/DL
EGFR: 68 ML/MIN/1.73M2
EOSINOPHIL # BLD AUTO: 0.22 K/UL
EOSINOPHIL NFR BLD AUTO: 4.4 %
GLUCOSE SERPL-MCNC: 98 MG/DL
HCT VFR BLD CALC: 41.1 %
HGB BLD-MCNC: 13.1 G/DL
IMM GRANULOCYTES NFR BLD AUTO: 0.2 %
LYMPHOCYTES # BLD AUTO: 2 K/UL
LYMPHOCYTES NFR BLD AUTO: 40.4 %
MAN DIFF?: NORMAL
MCHC RBC-ENTMCNC: 29.8 PG
MCHC RBC-ENTMCNC: 31.9 GM/DL
MCV RBC AUTO: 93.6 FL
MONOCYTES # BLD AUTO: 0.42 K/UL
MONOCYTES NFR BLD AUTO: 8.5 %
NEUTROPHILS # BLD AUTO: 2.27 K/UL
NEUTROPHILS NFR BLD AUTO: 45.9 %
PLATELET # BLD AUTO: 248 K/UL
POTASSIUM SERPL-SCNC: 4.9 MMOL/L
PROT SERPL-MCNC: 7.5 G/DL
RBC # BLD: 4.39 M/UL
RBC # FLD: 14.4 %
SODIUM SERPL-SCNC: 141 MMOL/L
WBC # FLD AUTO: 4.95 K/UL

## 2023-09-07 ENCOUNTER — TRANSCRIPTION ENCOUNTER (OUTPATIENT)
Age: 63
End: 2023-09-07

## 2023-09-08 ENCOUNTER — RESULT REVIEW (OUTPATIENT)
Age: 63
End: 2023-09-08

## 2023-09-08 ENCOUNTER — APPOINTMENT (OUTPATIENT)
Dept: COLORECTAL SURGERY | Facility: CLINIC | Age: 63
End: 2023-09-08
Payer: MEDICAID

## 2023-09-08 ENCOUNTER — OUTPATIENT (OUTPATIENT)
Dept: OUTPATIENT SERVICES | Facility: HOSPITAL | Age: 63
LOS: 1 days | End: 2023-09-08
Payer: MEDICAID

## 2023-09-08 DIAGNOSIS — Z12.11 ENCOUNTER FOR SCREENING FOR MALIGNANT NEOPLASM OF COLON: ICD-10-CM

## 2023-09-08 DIAGNOSIS — R19.4 CHANGE IN BOWEL HABIT: ICD-10-CM

## 2023-09-08 PROCEDURE — 88305 TISSUE EXAM BY PATHOLOGIST: CPT

## 2023-09-08 PROCEDURE — 45380 COLONOSCOPY AND BIOPSY: CPT | Mod: PT

## 2023-09-08 PROCEDURE — 45380 COLONOSCOPY AND BIOPSY: CPT

## 2023-09-08 PROCEDURE — 88305 TISSUE EXAM BY PATHOLOGIST: CPT | Mod: 26

## 2023-09-14 LAB — SURGICAL PATHOLOGY STUDY: SIGNIFICANT CHANGE UP

## 2023-09-15 ENCOUNTER — NON-APPOINTMENT (OUTPATIENT)
Age: 63
End: 2023-09-15

## 2023-11-14 ENCOUNTER — OUTPATIENT (OUTPATIENT)
Dept: OUTPATIENT SERVICES | Facility: HOSPITAL | Age: 63
LOS: 1 days | End: 2023-11-14
Payer: MEDICAID

## 2023-11-14 ENCOUNTER — NON-APPOINTMENT (OUTPATIENT)
Age: 63
End: 2023-11-14

## 2023-11-14 ENCOUNTER — APPOINTMENT (OUTPATIENT)
Dept: INTERNAL MEDICINE | Facility: CLINIC | Age: 63
End: 2023-11-14
Payer: MEDICAID

## 2023-11-14 ENCOUNTER — RESULT REVIEW (OUTPATIENT)
Age: 63
End: 2023-11-14

## 2023-11-14 VITALS
RESPIRATION RATE: 16 BRPM | DIASTOLIC BLOOD PRESSURE: 70 MMHG | TEMPERATURE: 97.3 F | SYSTOLIC BLOOD PRESSURE: 110 MMHG | HEIGHT: 66 IN | HEART RATE: 67 BPM | OXYGEN SATURATION: 97 %

## 2023-11-14 DIAGNOSIS — Z23 ENCOUNTER FOR IMMUNIZATION: ICD-10-CM

## 2023-11-14 DIAGNOSIS — Z00.00 ENCOUNTER FOR GENERAL ADULT MEDICAL EXAMINATION W/OUT ABNORMAL FINDINGS: ICD-10-CM

## 2023-11-14 DIAGNOSIS — M25.561 PAIN IN RIGHT KNEE: ICD-10-CM

## 2023-11-14 PROCEDURE — 90686 IIV4 VACC NO PRSV 0.5 ML IM: CPT

## 2023-11-14 PROCEDURE — 99214 OFFICE O/P EST MOD 30 MIN: CPT | Mod: 25

## 2023-11-14 PROCEDURE — 73564 X-RAY EXAM KNEE 4 OR MORE: CPT | Mod: 26,RT

## 2023-11-14 PROCEDURE — 99396 PREV VISIT EST AGE 40-64: CPT | Mod: 25

## 2023-11-14 PROCEDURE — G0008: CPT

## 2023-11-14 RX ORDER — FAMOTIDINE 20 MG/1
20 TABLET, FILM COATED ORAL DAILY
Qty: 30 | Refills: 0 | Status: ACTIVE | COMMUNITY
Start: 2023-11-14 | End: 1900-01-01

## 2023-11-14 RX ORDER — POLYETHYLENE GLYCOL 3350 17 G/17G
17 POWDER, FOR SOLUTION ORAL TWICE DAILY
Qty: 1 | Refills: 0 | Status: ACTIVE | COMMUNITY
Start: 2023-11-14 | End: 1900-01-01

## 2023-11-15 LAB
ALBUMIN SERPL ELPH-MCNC: 4.6 G/DL
ALP BLD-CCNC: 105 U/L
ALT SERPL-CCNC: 31 U/L
ANION GAP SERPL CALC-SCNC: 14 MMOL/L
APPEARANCE: CLEAR
AST SERPL-CCNC: 24 U/L
BACTERIA: NEGATIVE /HPF
BILIRUB SERPL-MCNC: 0.2 MG/DL
BILIRUBIN URINE: NEGATIVE
BLOOD URINE: NEGATIVE
BUN SERPL-MCNC: 12 MG/DL
C TRACH RRNA SPEC QL NAA+PROBE: NOT DETECTED
CALCIUM SERPL-MCNC: 9.3 MG/DL
CAST: 0 /LPF
CHLORIDE SERPL-SCNC: 103 MMOL/L
CHOLEST SERPL-MCNC: 257 MG/DL
CO2 SERPL-SCNC: 23 MMOL/L
COLOR: YELLOW
CREAT SERPL-MCNC: 0.85 MG/DL
CREAT SPEC-SCNC: 120 MG/DL
EGFR: 97 ML/MIN/1.73M2
EPITHELIAL CELLS: 0 /HPF
ESTIMATED AVERAGE GLUCOSE: 131 MG/DL
GLUCOSE QUALITATIVE U: NEGATIVE MG/DL
GLUCOSE SERPL-MCNC: 96 MG/DL
HBA1C MFR BLD HPLC: 6.2 %
HCT VFR BLD CALC: 44.4 %
HDLC SERPL-MCNC: 39 MG/DL
HGB BLD-MCNC: 13.4 G/DL
KETONES URINE: NEGATIVE MG/DL
LDLC SERPL CALC-MCNC: 164 MG/DL
LEUKOCYTE ESTERASE URINE: NEGATIVE
MCHC RBC-ENTMCNC: 29.1 PG
MCHC RBC-ENTMCNC: 30.2 GM/DL
MCV RBC AUTO: 96.5 FL
MICROALBUMIN 24H UR DL<=1MG/L-MCNC: <1.2 MG/DL
MICROALBUMIN/CREAT 24H UR-RTO: NORMAL MG/G
MICROSCOPIC-UA: NORMAL
N GONORRHOEA RRNA SPEC QL NAA+PROBE: NOT DETECTED
NITRITE URINE: NEGATIVE
NONHDLC SERPL-MCNC: 218 MG/DL
PH URINE: 8
PLATELET # BLD AUTO: 244 K/UL
POTASSIUM SERPL-SCNC: 4.6 MMOL/L
PROT SERPL-MCNC: 7.6 G/DL
PROTEIN URINE: NEGATIVE MG/DL
PSA SERPL-MCNC: 1.98 NG/ML
RBC # BLD: 4.6 M/UL
RBC # FLD: 13.6 %
RED BLOOD CELLS URINE: 1 /HPF
SODIUM SERPL-SCNC: 140 MMOL/L
SOURCE AMPLIFICATION: NORMAL
SPECIFIC GRAVITY URINE: 1.02
T PALLIDUM AB SER QL IA: NEGATIVE
TRIGL SERPL-MCNC: 285 MG/DL
TSH SERPL-ACNC: 2.71 UIU/ML
UROBILINOGEN URINE: 0.2 MG/DL
WBC # FLD AUTO: 4.77 K/UL
WHITE BLOOD CELLS URINE: 0 /HPF

## 2023-11-17 LAB — BACTERIA UR CULT: NORMAL

## 2024-01-01 NOTE — ED PROVIDER NOTE - CONSTITUTIONAL, MLM
This note was copied from the mother's chart.  Initial Lactation Visit    Hours since Delivery: 16 hours old    Gestational Age at Delivery: 39w4d     Diaper Count: 1 wet 3 soiled     Feedings: 7 breast 0 bottle      Breastfeeding goals:6-12 months    Past breastfeeding experience:first child    Maternal health risk that may affect breastfeeding:None    Breast Assessment:rounded, symmetrical. Large nipples, kim, intact    Feeding Assessment: Mother had just finished feeding in football hold on right side, and infant attempting latch on left side. Infant bundled in thick blanket and Mother holding her gown to cover self d/t visitors in room. Mother explained that she feels like infant prefers right side and latch can be difficult on left side. Assisted Mother with unbundling infant, positioning infant and getting infant deeply latched. Discussed infant feeding patterns and frequency.      Breastfeeding Plan:     Offer breast every 2-3 hours.   Massage breast to encourage milk flow   Strive for a deep and comfortable latch  Positioning reminders:  line up baby's nose to nipple   ear, shoulder, hip, nice straight line   chin off chest  your thumb lined up like baby's mustache, fingers under breast like a baby's beard  cheeks touching breast  Switch sides when swallows slow, baby pauses lengthen and compressions do not help    Education:   [x] Expected  feeding patterns in the first few days (pg. 38 of Your Guide to To Postpartum and  Care)/ the Second Night  [x] Stages of milk production  [x] Benefits of hand expression of colostrum  [x] Early feeding cues     [x] Benefits of skin to skin  [x] Breastfeeding positions  [x] Tips to get and maintain a deep latch  [x] Nutritive vs.non-nutritive sucking  [x] Gentle breast compressions as needed to enhance milk transfer  [x] How to tell when baby is finished  [] Expected  output  [] [place] weight loss  [] Infant Feeding Log  [] Signs breastfeeding is  going well (comfortable latch, audible swallows, age appropriate output and weight loss)    [] Engorgement  [] Reverse Pressure Softening  [] Pumping recommendations (based on patient need)  [x] Inpatient breastfeeding support  [] Outpatient lactation resources    Follow up: Will follow up tomorrow, 6/14, for further education and support    normal... Well appearing, well nourished, awake, alert, oriented to person, place, time/situation and in no apparent distress.

## 2024-01-04 ENCOUNTER — APPOINTMENT (OUTPATIENT)
Dept: INTERNAL MEDICINE | Facility: CLINIC | Age: 64
End: 2024-01-04
Payer: MEDICAID

## 2024-01-04 VITALS
BODY MASS INDEX: 25.71 KG/M2 | DIASTOLIC BLOOD PRESSURE: 82 MMHG | HEIGHT: 66 IN | WEIGHT: 160 LBS | SYSTOLIC BLOOD PRESSURE: 116 MMHG

## 2024-01-04 DIAGNOSIS — E78.2 MIXED HYPERLIPIDEMIA: ICD-10-CM

## 2024-01-04 DIAGNOSIS — S39.011A STRAIN OF MUSCLE, FASCIA AND TENDON OF ABDOMEN, INITIAL ENCOUNTER: ICD-10-CM

## 2024-01-04 PROCEDURE — G0444 DEPRESSION SCREEN ANNUAL: CPT | Mod: 59

## 2024-01-04 PROCEDURE — 99214 OFFICE O/P EST MOD 30 MIN: CPT | Mod: 25

## 2024-01-04 RX ORDER — ATORVASTATIN CALCIUM 20 MG/1
20 TABLET, FILM COATED ORAL
Qty: 90 | Refills: 1 | Status: ACTIVE | COMMUNITY
Start: 2024-01-04 | End: 1900-01-01

## 2024-01-04 NOTE — HISTORY OF PRESENT ILLNESS
[FreeTextEntry1] : abd wall pain follow up lipid [de-identified] : abd wall pain  follow up lipid has been feeling pain  when ending no hernia also chol is elveated 257

## 2024-01-04 NOTE — HEALTH RISK ASSESSMENT
[No] : No [No falls in past year] : Patient reported no falls in the past year [Little interest or pleasure doing things] : 1) Little interest or pleasure doing things [Feeling down, depressed, or hopeless] : 2) Feeling down, depressed, or hopeless [0] : 2) Feeling down, depressed, or hopeless: Not at all (0) [PHQ-2 Negative - No further assessment needed] : PHQ-2 Negative - No further assessment needed [[id number]] : 0 [Never] : Never

## 2024-01-22 ENCOUNTER — APPOINTMENT (OUTPATIENT)
Dept: INTERNAL MEDICINE | Facility: CLINIC | Age: 64
End: 2024-01-22

## 2024-01-22 NOTE — ASSESSMENT
[FreeTextEntry1] : Health maintenance - CBC, CMP, TSH/free T4, PSA, A1C, Albumin/cr ratio - UA - given flu shot - EKG: NSR  Chronic constipation - Colonoscopy noted benign rectal polyps and descending polyps - given Miralax  Chronic right knee pain - sent for Xray of patella to r/o foreign bodies - Pt refusing PT at this time  Asthma - given albuterol  Heartburn - Felipe Gandhi DO.

## 2024-01-22 NOTE — HISTORY OF PRESENT ILLNESS
[FreeTextEntry1] : Follow up visit [de-identified] : 63M with PMHx of Asthma, BPH, recent colonoscopy (9/8/23) with benign rectal polyps and descending polyps presenting for follow up visit. Pt reports feeling blotted without any pain, BM once a day - not on laxative now, normally has 3BM. Chronic right knee pain- XR refusing PT.

## 2024-04-15 ENCOUNTER — INPATIENT (INPATIENT)
Facility: HOSPITAL | Age: 64
LOS: 1 days | Discharge: HOME | DRG: 392 | End: 2024-04-17
Attending: INTERNAL MEDICINE | Admitting: INTERNAL MEDICINE
Payer: MEDICAID

## 2024-04-15 VITALS
WEIGHT: 162.04 LBS | TEMPERATURE: 98 F | HEIGHT: 66 IN | DIASTOLIC BLOOD PRESSURE: 71 MMHG | OXYGEN SATURATION: 99 % | HEART RATE: 92 BPM | RESPIRATION RATE: 16 BRPM | SYSTOLIC BLOOD PRESSURE: 146 MMHG

## 2024-04-15 DIAGNOSIS — D3A.00 BENIGN CARCINOID TUMOR OF UNSPECIFIED SITE: ICD-10-CM

## 2024-04-15 DIAGNOSIS — K63.9 DISEASE OF INTESTINE, UNSPECIFIED: ICD-10-CM

## 2024-04-15 DIAGNOSIS — K59.00 CONSTIPATION, UNSPECIFIED: ICD-10-CM

## 2024-04-15 DIAGNOSIS — R10.9 UNSPECIFIED ABDOMINAL PAIN: ICD-10-CM

## 2024-04-15 LAB
ALBUMIN SERPL ELPH-MCNC: 3.8 G/DL — SIGNIFICANT CHANGE UP (ref 3.3–5.2)
ALP SERPL-CCNC: 88 U/L — SIGNIFICANT CHANGE UP (ref 40–120)
ALT FLD-CCNC: 82 U/L — HIGH
ANION GAP SERPL CALC-SCNC: 10 MMOL/L — SIGNIFICANT CHANGE UP (ref 5–17)
AST SERPL-CCNC: 51 U/L — HIGH
BASOPHILS # BLD AUTO: 0.02 K/UL — SIGNIFICANT CHANGE UP (ref 0–0.2)
BASOPHILS NFR BLD AUTO: 0.5 % — SIGNIFICANT CHANGE UP (ref 0–2)
BILIRUB SERPL-MCNC: 0.2 MG/DL — LOW (ref 0.4–2)
BUN SERPL-MCNC: 10.4 MG/DL — SIGNIFICANT CHANGE UP (ref 8–20)
CALCIUM SERPL-MCNC: 9.1 MG/DL — SIGNIFICANT CHANGE UP (ref 8.4–10.5)
CHLORIDE SERPL-SCNC: 104 MMOL/L — SIGNIFICANT CHANGE UP (ref 96–108)
CO2 SERPL-SCNC: 24 MMOL/L — SIGNIFICANT CHANGE UP (ref 22–29)
CREAT SERPL-MCNC: 0.8 MG/DL — SIGNIFICANT CHANGE UP (ref 0.5–1.3)
EGFR: 98 ML/MIN/1.73M2 — SIGNIFICANT CHANGE UP
EOSINOPHIL # BLD AUTO: 0.1 K/UL — SIGNIFICANT CHANGE UP (ref 0–0.5)
EOSINOPHIL NFR BLD AUTO: 2.3 % — SIGNIFICANT CHANGE UP (ref 0–6)
GLUCOSE SERPL-MCNC: 99 MG/DL — SIGNIFICANT CHANGE UP (ref 70–99)
HCT VFR BLD CALC: 41.6 % — SIGNIFICANT CHANGE UP (ref 39–50)
HGB BLD-MCNC: 13.7 G/DL — SIGNIFICANT CHANGE UP (ref 13–17)
IMM GRANULOCYTES NFR BLD AUTO: 0.2 % — SIGNIFICANT CHANGE UP (ref 0–0.9)
LIDOCAIN IGE QN: 16 U/L — LOW (ref 22–51)
LYMPHOCYTES # BLD AUTO: 1.69 K/UL — SIGNIFICANT CHANGE UP (ref 1–3.3)
LYMPHOCYTES # BLD AUTO: 38.1 % — SIGNIFICANT CHANGE UP (ref 13–44)
MAGNESIUM SERPL-MCNC: 2.2 MG/DL — SIGNIFICANT CHANGE UP (ref 1.8–2.6)
MCHC RBC-ENTMCNC: 30 PG — SIGNIFICANT CHANGE UP (ref 27–34)
MCHC RBC-ENTMCNC: 32.9 GM/DL — SIGNIFICANT CHANGE UP (ref 32–36)
MCV RBC AUTO: 91 FL — SIGNIFICANT CHANGE UP (ref 80–100)
MONOCYTES # BLD AUTO: 0.63 K/UL — SIGNIFICANT CHANGE UP (ref 0–0.9)
MONOCYTES NFR BLD AUTO: 14.2 % — HIGH (ref 2–14)
NEUTROPHILS # BLD AUTO: 1.98 K/UL — SIGNIFICANT CHANGE UP (ref 1.8–7.4)
NEUTROPHILS NFR BLD AUTO: 44.7 % — SIGNIFICANT CHANGE UP (ref 43–77)
PHOSPHATE SERPL-MCNC: 2.7 MG/DL — SIGNIFICANT CHANGE UP (ref 2.4–4.7)
PLATELET # BLD AUTO: 212 K/UL — SIGNIFICANT CHANGE UP (ref 150–400)
POTASSIUM SERPL-MCNC: 4.4 MMOL/L — SIGNIFICANT CHANGE UP (ref 3.5–5.3)
POTASSIUM SERPL-SCNC: 4.4 MMOL/L — SIGNIFICANT CHANGE UP (ref 3.5–5.3)
PROT SERPL-MCNC: 6.8 G/DL — SIGNIFICANT CHANGE UP (ref 6.6–8.7)
RBC # BLD: 4.57 M/UL — SIGNIFICANT CHANGE UP (ref 4.2–5.8)
RBC # FLD: 14.3 % — SIGNIFICANT CHANGE UP (ref 10.3–14.5)
SODIUM SERPL-SCNC: 138 MMOL/L — SIGNIFICANT CHANGE UP (ref 135–145)
WBC # BLD: 4.43 K/UL — SIGNIFICANT CHANGE UP (ref 3.8–10.5)
WBC # FLD AUTO: 4.43 K/UL — SIGNIFICANT CHANGE UP (ref 3.8–10.5)

## 2024-04-15 PROCEDURE — 99223 1ST HOSP IP/OBS HIGH 75: CPT

## 2024-04-15 PROCEDURE — 74177 CT ABD & PELVIS W/CONTRAST: CPT | Mod: 26,MC

## 2024-04-15 PROCEDURE — 99285 EMERGENCY DEPT VISIT HI MDM: CPT

## 2024-04-15 RX ORDER — PANTOPRAZOLE SODIUM 20 MG/1
40 TABLET, DELAYED RELEASE ORAL
Refills: 0 | Status: DISCONTINUED | OUTPATIENT
Start: 2024-04-15 | End: 2024-04-17

## 2024-04-15 RX ORDER — FLUTICASONE FUROATE AND VILANTEROL TRIFENATATE 100; 25 UG/1; UG/1
1 POWDER RESPIRATORY (INHALATION)
Qty: 0 | Refills: 0 | DISCHARGE

## 2024-04-15 RX ORDER — TRAMADOL HYDROCHLORIDE 50 MG/1
25 TABLET ORAL THREE TIMES A DAY
Refills: 0 | Status: DISCONTINUED | OUTPATIENT
Start: 2024-04-15 | End: 2024-04-17

## 2024-04-15 RX ORDER — ACETAMINOPHEN 500 MG
650 TABLET ORAL EVERY 6 HOURS
Refills: 0 | Status: DISCONTINUED | OUTPATIENT
Start: 2024-04-15 | End: 2024-04-17

## 2024-04-15 RX ORDER — SODIUM CHLORIDE 9 MG/ML
1000 INJECTION, SOLUTION INTRAVENOUS ONCE
Refills: 0 | Status: COMPLETED | OUTPATIENT
Start: 2024-04-15 | End: 2024-04-15

## 2024-04-15 RX ADMIN — SODIUM CHLORIDE 1000 MILLILITER(S): 9 INJECTION, SOLUTION INTRAVENOUS at 11:56

## 2024-04-15 NOTE — H&P ADULT - ASSESSMENT
65 yo M /w hx HLD, BPH, and asthma presents with a chief complaint of worsening abdominal pain, diarrhea and chills. symptoms have been ongoing for months. no nausea/vomiting or fevers.  Pain is worsened with oral intake, with accompanying abdominal distention. Patient also reports heartburn for which he takes Mylanta. He does suffer from intermittent constipation and was taking a "white powder" OTC for constipation. Patient reports he had 2-3 episodes of dark diarrhea over this past weekend. Had a colonoscopy 9/23 w/ findings of stage II internal hemorrhoids, polypectomy (hyperplastic), and moderate diverticulosis of sigmoid colon. Never had an EGD  GI consulted for CT A/P with finding of a well-circumscribed homogeneously enhancing lesion arising from the wall of the distal third portion of the duodenum, strongly suspicious for carcinoid.   admission requested for egd/eus.       abd pain/diarrhea  CT with duodenal mass     GI following     PPI BID, clear liquids, NPO after MN     EGD/EUS in am     hx BPH/asthma/HLD    denies any current medications

## 2024-04-15 NOTE — CONSULT NOTE ADULT - PROBLEM SELECTOR RECOMMENDATION 3
Patient with a likely carcinoid lesion in the distal small bowel.  Will schedule patient for enteroscopy for tomorrow  N.p.o. after midnight

## 2024-04-15 NOTE — CONSULT NOTE ADULT - SUBJECTIVE AND OBJECTIVE BOX
Chief Complaint:  Patient is a 64y old  Male who presents with a chief complaint of abdominal pain     HPI:   This is a 64y old male with PMHx HLD, prostatomegaly, and asthma who presents with a chief complaint of abdominal pain.  [id number] and daughter at bedside Meenu at bedside providing translation. Daughter reports patient has been having intermittent abdominal pain for at least 6 months. Patient presented to ED with worsening pain. Patient describes pain as bilateral -        PAST MEDICAL & SURGICAL HISTORY:  Enlarged prostate      HLD (hyperlipidemia)      Asthma      No significant past surgical history          REVIEW OF SYSTEMS:   General: Negative  HEENT: Negative  CV: Negative  Respiratory: Negative  GI: See HPI  : Negative  MSK: Negative  Hematologic: Negative  Skin: Negative    MEDICATIONS:   MEDICATIONS  (STANDING):    MEDICATIONS  (PRN):          DIET:          ALLERGIES:   Allergies    aspirin (Rash)    Intolerances        Substance Use:   (  ) never used  (  ) other:  Tobacco Usage:  (   ) never smoked   (   ) former smoker   (   ) current smoker  (     ) pack year  (        ) last cigarette date  Alcohol Usage:    Family History   IBD (  ) Yes   (  ) No  GI Malignancy (  )  Yes    (  ) No    Health Management  Last Colonoscopy:  Last Endoscopy:     VITAL SIGNS:   Vital Signs Last 24 Hrs  T(C): 36.7 (15 Apr 2024 09:51), Max: 36.7 (15 Apr 2024 09:51)  T(F): 98.1 (15 Apr 2024 09:51), Max: 98.1 (15 Apr 2024 09:51)  HR: 92 (15 Apr 2024 09:51) (92 - 92)  BP: 146/71 (15 Apr 2024 09:51) (146/71 - 146/71)  BP(mean): --  RR: 16 (15 Apr 2024 09:51) (16 - 16)  SpO2: 99% (15 Apr 2024 09:51) (99% - 99%)    Parameters below as of 15 Apr 2024 09:51  Patient On (Oxygen Delivery Method): room air      I&O's Summary      PHYSICAL EXAM:   GENERAL:  No acute distress  HEENT:  NC/AT, conjunctiva clear, sclera anicteric  CHEST:  No increased effort  HEART:  Regular rate  ABDOMEN:  Soft, non-tender, non-distended, normoactive bowel sounds, no rebound or guarding  EXTREMITIES: No edema  SKIN:  Warm, dry  NEURO:  Calm, cooperative    LABS:                        13.7   4.43  )-----------( 212      ( 15 Apr 2024 11:58 )             41.6     Hemoglobin: 13.7 g/dL (04-15-24 @ 11:58)    04-15    138  |  104  |  10.4  ----------------------------<  99  4.4   |  24.0  |  0.80    Ca    9.1      15 Apr 2024 11:58  Phos  2.7     04-15  Mg     2.2     04-15    TPro  6.8  /  Alb  3.8  /  TBili  0.2<L>  /  DBili  x   /  AST  51<H>  /  ALT  82<H>  /  AlkPhos  88  04-15    LIVER FUNCTIONS - ( 15 Apr 2024 11:58 )  Alb: 3.8 g/dL / Pro: 6.8 g/dL / ALK PHOS: 88 U/L / ALT: 82 U/L / AST: 51 U/L / GGT: x                 Lipase: 16 U/L (04-15-24 @ 11:58)                                    RADIOLOGY & ADDITIONAL STUDIES:      ACC: [id number] EXAM:  CT ABDOMEN AND PELVIS IC   ORDERED BY: MINERVA JULIO     PROCEDURE DATE:  04/15/2024          INTERPRETATION:  CLINICAL INFORMATION: Diffuse abdominal pain.    COMPARISON: July 5, 2023.    CONTRAST/COMPLICATIONS:  IV Contrast: Omnipaque 350  90 cc administered   10 cc discarded  Oral Contrast: NONE  Complications: None reported at time of study completion    PROCEDURE:  CT of the Abdomen and Pelvis was performed.  Sagittal and coronal reformats were performed.    FINDINGS:  LOWER CHEST: Within normal limits.    LIVER: Stable scattered tiny hypoattenuating right liver lesions,   compatible with cysts. Otherwise, within normal limits.  BILE DUCTS: Normal caliber.  GALLBLADDER: Within normal limits.  SPLEEN: Within normal limits.  PANCREAS: Within normal limits.  ADRENALS: Within normal limits.  KIDNEYS/URETERS: Stable small bilateral renal cysts. Stable minimal   scarring in the posterior left kidney. Otherwise, within normal limits.    BLADDER: Within normal limits.  REPRODUCTIVE ORGANS: The prostate is not enlarged.    BOWEL: There is a stable 2.6 x 2.1 cm well-circumscribed homogeneously   enhancing lesion inseparable from the wall of the distal third portion of   the duodenum on image 57, strongly suspicious for carcinoid tumor. There   is no adjacent adenopathy. There is diverticulosis of the sigmoid colon.   There is no bowel obstruction. The appendix is unremarkable. Shotty   ileocolic lymph nodes.  PERITONEUM: No ascites.  VESSELS: Within normal limits.  RETROPERITONEUM/LYMPH NODES: No lymphadenopathy.  ABDOMINAL WALL: Within normal limits.  BONES: Within normal limits.    IMPRESSION: Well-circumscribed homogeneously enhancing lesion arising   from the wall of the distal third portion of the duodenum, strongly   suspicious for carcinoid, in retrospect stable from prior CT in July 2023. No evidence of regional adenopathy or metastatic disease. Recommend   GI consultation for endoscopic biopsy. Results discussed with Dr. Julio   at time of interpretation.    --- End of Report ---            PIERRE HARMON MD; Attending Radiologist  This document has been electronically signed. Apr 15 2024  1:25PM  04-15-24 @ 13:03       Chief Complaint:  Patient is a 64y old  Male who presents with a chief complaint of abdominal pain     HPI:   This is a 64y old male with PMHx HLD, BPH, and asthma who presents with a chief complaint of worsening abdominal pain. GI consulted for CT A/P with finding of a well-circumscribed homogeneously enhancing lesion arising from the wall of the distal third portion of the duodenum, strongly suspicious for carcinoid. Wife and daughter Meenu at bedside. Daughter reports patient has been having intermittent abdominal pain for at least 6 months. Patient describes pain as a "knot" in his epigastric area radiating diffusely. Patient attributed pain to his previous urologic problem, and went to see a urologist. Daughter reports he took a medication that is "similar to ibuprofen" from [place] on Saturday to relieve his pain. Pain is worsened with oral intake, with accompanying abdominal distention. Patient also reports heartburn for which he takes Mylanta. He does suffer from intermittent constipation and was taking a "white powder" OTC for constipation. Patient reports he had 2-3 episodes of dark diarrhea over this past weekend. Had a colonoscopy 9/23 w/ findings of stage II internal hemorrhoids, polypectomy (hyperplastic), and moderate diverticulosis of sigmoid colon. Never had an EGD. Occasionally drinks beer. Denies smoking and illicit alcohol drug use. Denies chest pain, shortness of breath, palpitations, fever, chills, nausea, vomiting, hematemesis, hematochezia, and melena.       [id number]    PAST MEDICAL & SURGICAL HISTORY:  Enlarged prostate      HLD (hyperlipidemia)      Asthma      No significant past surgical history          REVIEW OF SYSTEMS:   General: Negative  HEENT: Negative  CV: Negative  Respiratory: Negative  GI: See HPI  : Negative  MSK: Negative  Hematologic: Negative  Skin: Negative    MEDICATIONS:   MEDICATIONS  (STANDING):    MEDICATIONS  (PRN):          DIET:          ALLERGIES:   Allergies    aspirin (Rash)    Intolerances        Substance Use:   (  ) never used  (  ) other:  Tobacco Usage:  (   ) never smoked   (   ) former smoker   (   ) current smoker  (     ) pack year  (        ) last cigarette date  Alcohol Usage:    Family History   IBD (  ) Yes   (  ) No  GI Malignancy (  )  Yes    (  ) No    Health Management  Last Colonoscopy:  Last Endoscopy:     VITAL SIGNS:   Vital Signs Last 24 Hrs  T(C): 36.7 (15 Apr 2024 09:51), Max: 36.7 (15 Apr 2024 09:51)  T(F): 98.1 (15 Apr 2024 09:51), Max: 98.1 (15 Apr 2024 09:51)  HR: 92 (15 Apr 2024 09:51) (92 - 92)  BP: 146/71 (15 Apr 2024 09:51) (146/71 - 146/71)  BP(mean): --  RR: 16 (15 Apr 2024 09:51) (16 - 16)  SpO2: 99% (15 Apr 2024 09:51) (99% - 99%)    Parameters below as of 15 Apr 2024 09:51  Patient On (Oxygen Delivery Method): room air      I&O's Summary      PHYSICAL EXAM:   GENERAL:  No acute distress  HEENT:  NC/AT, conjunctiva clear, sclera anicteric  CHEST:  No increased effort  HEART:  Regular rate  ABDOMEN:  Soft, + TTP, non-distended, normoactive bowel sounds, no rebound or guarding  EXTREMITIES: No edema  SKIN:  Warm, dry  NEURO:  Calm, cooperative    LABS:                        13.7   4.43  )-----------( 212      ( 15 Apr 2024 11:58 )             41.6     Hemoglobin: 13.7 g/dL (04-15-24 @ 11:58)    04-15    138  |  104  |  10.4  ----------------------------<  99  4.4   |  24.0  |  0.80    Ca    9.1      15 Apr 2024 11:58  Phos  2.7     04-15  Mg     2.2     04-15    TPro  6.8  /  Alb  3.8  /  TBili  0.2<L>  /  DBili  x   /  AST  51<H>  /  ALT  82<H>  /  AlkPhos  88  04-15    LIVER FUNCTIONS - ( 15 Apr 2024 11:58 )  Alb: 3.8 g/dL / Pro: 6.8 g/dL / ALK PHOS: 88 U/L / ALT: 82 U/L / AST: 51 U/L / GGT: x           Lipase: 16 U/L (04-15-24 @ 11:58)    RADIOLOGY & ADDITIONAL STUDIES:      ACC: [id number] EXAM:  CT ABDOMEN AND PELVIS IC   ORDERED BY: MINERVA JULIO     PROCEDURE DATE:  04/15/2024          INTERPRETATION:  CLINICAL INFORMATION: Diffuse abdominal pain.    COMPARISON: July 5, 2023.    CONTRAST/COMPLICATIONS:  IV Contrast: Omnipaque 350  90 cc administered   10 cc discarded  Oral Contrast: NONE  Complications: None reported at time of study completion    PROCEDURE:  CT of the Abdomen and Pelvis was performed.  Sagittal and coronal reformats were performed.    FINDINGS:  LOWER CHEST: Within normal limits.    LIVER: Stable scattered tiny hypoattenuating right liver lesions,   compatible with cysts. Otherwise, within normal limits.  BILE DUCTS: Normal caliber.  GALLBLADDER: Within normal limits.  SPLEEN: Within normal limits.  PANCREAS: Within normal limits.  ADRENALS: Within normal limits.  KIDNEYS/URETERS: Stable small bilateral renal cysts. Stable minimal   scarring in the posterior left kidney. Otherwise, within normal limits.    BLADDER: Within normal limits.  REPRODUCTIVE ORGANS: The prostate is not enlarged.    BOWEL: There is a stable 2.6 x 2.1 cm well-circumscribed homogeneously   enhancing lesion inseparable from the wall of the distal third portion of   the duodenum on image 57, strongly suspicious for carcinoid tumor. There   is no adjacent adenopathy. There is diverticulosis of the sigmoid colon.   There is no bowel obstruction. The appendix is unremarkable. Shotty   ileocolic lymph nodes.  PERITONEUM: No ascites.  VESSELS: Within normal limits.  RETROPERITONEUM/LYMPH NODES: No lymphadenopathy.  ABDOMINAL WALL: Within normal limits.  BONES: Within normal limits.    IMPRESSION: Well-circumscribed homogeneously enhancing lesion arising   from the wall of the distal third portion of the duodenum, strongly   suspicious for carcinoid, in retrospect stable from prior CT in July 2023. No evidence of regional adenopathy or metastatic disease. Recommend   GI consultation for endoscopic biopsy. Results discussed with Dr. Julio   at time of interpretation.    --- End of Report ---      PIERRE HARMON MD; Attending Radiologist  This document has been electronically signed. Apr 15 2024  1:25PM  04-15-24 @ 13:03       Chief Complaint:  Patient is a 64y old  Male who presents with a chief complaint of abdominal pain     HPI:   This is a 64y old male with PMHx HLD, BPH, and asthma who presents with a chief complaint of worsening abdominal pain. GI consulted for CT A/P with finding of a well-circumscribed homogeneously enhancing lesion arising from the wall of the distal third portion of the duodenum, strongly suspicious for carcinoid. Wife and daughter Meenu at bedside. Daughter reports patient has been having intermittent abdominal pain for at least 6 months. Patient describes pain as a "knot" in his epigastric area radiating diffusely. Patient attributed pain to his previous urologic problem, and went to see a urologist. Daughter reports he took a medication that is "similar to ibuprofen" from [place] on Saturday to relieve his pain. Pain is worsened with oral intake, with accompanying abdominal distention. Patient also reports heartburn for which he takes Mylanta. He does suffer from intermittent constipation and was taking a "white powder" in an orange bottle  OTC for constipation. Patient reports he had 2-3 episodes of dark diarrhea over this past weekend. Had a colonoscopy 9/23 w/ findings of stage II internal hemorrhoids, polypectomy (hyperplastic), and moderate diverticulosis of sigmoid colon. Never had an EGD. Occasionally drinks beer. Denies smoking and illicit alcohol drug use. Denies chest pain, shortness of breath, palpitations, fever, chills, nausea, vomiting, hematemesis, hematochezia, and melena.       [id number]    PAST MEDICAL & SURGICAL HISTORY:  Enlarged prostate      HLD (hyperlipidemia)      Asthma      No significant past surgical history          REVIEW OF SYSTEMS:   General: Negative  HEENT: Negative  CV: Negative  Respiratory: Negative  GI: See HPI  : Negative  MSK: Negative  Hematologic: Negative  Skin: Negative    MEDICATIONS:   MEDICATIONS  (STANDING):    MEDICATIONS  (PRN):          DIET:          ALLERGIES:   Allergies    aspirin (Rash)    Intolerances        Substance Use:   (  ) never used  (  ) other:  Tobacco Usage:  (   ) never smoked   (   ) former smoker   (   ) current smoker  (     ) pack year  (        ) last cigarette date  Alcohol Usage:    Family History   IBD (  ) Yes   (  ) No  GI Malignancy (  )  Yes    (  ) No    Health Management  Last Colonoscopy:  Last Endoscopy:     VITAL SIGNS:   Vital Signs Last 24 Hrs  T(C): 36.7 (15 Apr 2024 09:51), Max: 36.7 (15 Apr 2024 09:51)  T(F): 98.1 (15 Apr 2024 09:51), Max: 98.1 (15 Apr 2024 09:51)  HR: 92 (15 Apr 2024 09:51) (92 - 92)  BP: 146/71 (15 Apr 2024 09:51) (146/71 - 146/71)  BP(mean): --  RR: 16 (15 Apr 2024 09:51) (16 - 16)  SpO2: 99% (15 Apr 2024 09:51) (99% - 99%)    Parameters below as of 15 Apr 2024 09:51  Patient On (Oxygen Delivery Method): room air      I&O's Summary      PHYSICAL EXAM:   GENERAL:  No acute distress  HEENT:  NC/AT, conjunctiva clear, sclera anicteric  CHEST:  No increased effort  HEART:  Regular rate  ABDOMEN:  Soft, + TTP, non-distended, normoactive bowel sounds, no rebound or guarding  EXTREMITIES: No edema  SKIN:  Warm, dry  NEURO:  Calm, cooperative    LABS:                        13.7   4.43  )-----------( 212      ( 15 Apr 2024 11:58 )             41.6     Hemoglobin: 13.7 g/dL (04-15-24 @ 11:58)    04-15    138  |  104  |  10.4  ----------------------------<  99  4.4   |  24.0  |  0.80    Ca    9.1      15 Apr 2024 11:58  Phos  2.7     04-15  Mg     2.2     04-15    TPro  6.8  /  Alb  3.8  /  TBili  0.2<L>  /  DBili  x   /  AST  51<H>  /  ALT  82<H>  /  AlkPhos  88  04-15    LIVER FUNCTIONS - ( 15 Apr 2024 11:58 )  Alb: 3.8 g/dL / Pro: 6.8 g/dL / ALK PHOS: 88 U/L / ALT: 82 U/L / AST: 51 U/L / GGT: x           Lipase: 16 U/L (04-15-24 @ 11:58)    RADIOLOGY & ADDITIONAL STUDIES:      ACC: [id number] EXAM:  CT ABDOMEN AND PELVIS IC   ORDERED BY: MINERVA JULIO     PROCEDURE DATE:  04/15/2024          INTERPRETATION:  CLINICAL INFORMATION: Diffuse abdominal pain.    COMPARISON: July 5, 2023.    CONTRAST/COMPLICATIONS:  IV Contrast: Omnipaque 350  90 cc administered   10 cc discarded  Oral Contrast: NONE  Complications: None reported at time of study completion    PROCEDURE:  CT of the Abdomen and Pelvis was performed.  Sagittal and coronal reformats were performed.    FINDINGS:  LOWER CHEST: Within normal limits.    LIVER: Stable scattered tiny hypoattenuating right liver lesions,   compatible with cysts. Otherwise, within normal limits.  BILE DUCTS: Normal caliber.  GALLBLADDER: Within normal limits.  SPLEEN: Within normal limits.  PANCREAS: Within normal limits.  ADRENALS: Within normal limits.  KIDNEYS/URETERS: Stable small bilateral renal cysts. Stable minimal   scarring in the posterior left kidney. Otherwise, within normal limits.    BLADDER: Within normal limits.  REPRODUCTIVE ORGANS: The prostate is not enlarged.    BOWEL: There is a stable 2.6 x 2.1 cm well-circumscribed homogeneously   enhancing lesion inseparable from the wall of the distal third portion of   the duodenum on image 57, strongly suspicious for carcinoid tumor. There   is no adjacent adenopathy. There is diverticulosis of the sigmoid colon.   There is no bowel obstruction. The appendix is unremarkable. Shotty   ileocolic lymph nodes.  PERITONEUM: No ascites.  VESSELS: Within normal limits.  RETROPERITONEUM/LYMPH NODES: No lymphadenopathy.  ABDOMINAL WALL: Within normal limits.  BONES: Within normal limits.    IMPRESSION: Well-circumscribed homogeneously enhancing lesion arising   from the wall of the distal third portion of the duodenum, strongly   suspicious for carcinoid, in retrospect stable from prior CT in July 2023. No evidence of regional adenopathy or metastatic disease. Recommend   GI consultation for endoscopic biopsy. Results discussed with Dr. Julio   at time of interpretation.    --- End of Report ---      PIERRE HARMON MD; Attending Radiologist  This document has been electronically signed. Apr 15 2024  1:25PM  04-15-24 @ 13:03

## 2024-04-15 NOTE — ED PROVIDER NOTE - PROGRESS NOTE DETAILS
[facility] CT findings reviewed with patient, no prior knowledge of carcinoid tumor, has had c-scope within 1 year (doesn't recall GI MD), referred by MD Granados PCP.  He endorses epigastric abdominal pain, and episodes of diarrhea.  He denies fever or vomiting.  +family history of stomach cancer, sister.  He denies smoking, drinking or illicit drug use.  GI consulted for possible endoscopic biopsy.

## 2024-04-15 NOTE — ED PROVIDER NOTE - CLINICAL SUMMARY MEDICAL DECISION MAKING FREE TEXT BOX
Vague abdominal pain symptoms, possible pain secondary to constipation however patient has failed outpatient pharmacotherapy and will obtain CT imaging.

## 2024-04-15 NOTE — ED PROVIDER NOTE - OBJECTIVE STATEMENT
64 M history high cholesterol presenting with diffuse abdominal pain mainly upper over the past 2 weeks, seen his PMD as an outpatient who prescribed a powder and has had no relief, he has had some diarrhea as of this morning.  Denies any fevers or chills.  Pain is described as upper and bandlike and distribution, no nausea or vomiting.  Last colonoscopy shows evidence of polyps and "slow-moving bowel fold.

## 2024-04-15 NOTE — CONSULT NOTE ADULT - ASSESSMENT
This is a 64y old male with PMHx HLD, BPH, and asthma who presents with a chief complaint of worsening abdominal pain. GI consulted for abnormal finding on CT    Colonoscopy (09.2023) : stage II internal hemorrhoids  * polypectomy   * moderate diverticulosis of sigmoid colon     #abdominal pain  #GERD  CT A/P w/ IC (04.15.24) Well-circumscribed homogeneously enhancing lesion arising   from the wall of the distal third portion of the duodenum, strongly   suspicious for carcinoid  - Plan for EGD +/- biopsy tomorrow 4/16  - NPO at midnight  - Clear liquid diet today  - PPI IVP BID for mucosal cytoprotection   - Active T&S, INR < 1.5, platelet >50  - Pain management per primary team    _________________________________________________________________  Assessment and recommendations are final when note is signed by the attending physician.

## 2024-04-15 NOTE — PATIENT PROFILE ADULT - FALL HARM RISK - UNIVERSAL INTERVENTIONS
Bed in lowest position, wheels locked, appropriate side rails in place/Call bell, personal items and telephone in reach/Instruct patient to call for assistance before getting out of bed or chair/Non-slip footwear when patient is out of bed/[place] to call system/Physically safe environment - no spills, clutter or unnecessary equipment/Purposeful Proactive Rounding/Room/bathroom lighting operational, light cord in reach

## 2024-04-15 NOTE — CONSULT NOTE ADULT - NS ATTEST RISK PROBLEM GEN_ALL_CORE FT
Worsening epigastric pain, constipation, abnormal CAT scan    Hemoglobin 13.7 CBC ordered for tomorrow.  Family is at the bedside and contributed to history.    I reviewed the CAT scan images.  My interpretation lesion in the distal small bowel.  We have scheduled enteroscopy for tomorrow.  We discussed risks and benefits including but not limited to bleeding infection perforation.  N.p.o. after midnight  PPI twice daily  MiraLAX daily

## 2024-04-15 NOTE — ED CLERICAL - NS ED CLERK UNITS
ANY [id number] I have personally seen and examined the patient. I have collaborated with and supervised the

## 2024-04-15 NOTE — H&P ADULT - HISTORY OF PRESENT ILLNESS
65 yo M /w hx HLD, BPH, and asthma presents with a chief complaint of worsening abdominal pain, diarrhea and chills. symptoms have been ongoing for months. no nausea/vomiting or fevers.  Pain is worsened with oral intake, with accompanying abdominal distention. Patient also reports heartburn for which he takes Mylanta. He does suffer from intermittent constipation and was taking a "white powder" OTC for constipation. Patient reports he had 2-3 episodes of dark diarrhea over this past weekend. Had a colonoscopy 9/23 w/ findings of stage II internal hemorrhoids, polypectomy (hyperplastic), and moderate diverticulosis of sigmoid colon. Never had an EGD  GI consulted for CT A/P with finding of a well-circumscribed homogeneously enhancing lesion arising from the wall of the distal third portion of the duodenum, strongly suspicious for carcinoid.   admission requested for egd/eus.

## 2024-04-15 NOTE — PATIENT PROFILE ADULT - TRANSPORTATION
Visit Information Date & Time Provider Department Dept. Phone Encounter #  
 1/11/2017  9:00 AM Omar Chowdary  [facility] [address] [address] [id number] Follow-up Instructions Return in about 3 months (around 4/11/2017). Follow-up and Disposition History Your Appointments 4/12/2017  1:45 PM  
Follow Up with Omar Chowdary MD  
[address] [place] [address]-[facility] Appt Note: 3mon f/u; Physical Therapy [address] [facility] [id number] [phone]  
  
   
 [facility] [id number] Upcoming Health Maintenance Date Due Hepatitis C Screening 1965 FOOT EXAM Q1 2/14/1974 MICROALBUMIN Q1 2/14/1974 EYE EXAM RETINAL OR DILATED Q1 2/14/1974 DTaP/Tdap/Td series (1 - Tdap) 2/14/1985 PAP AKA CERVICAL CYTOLOGY 2/14/1985 BREAST CANCER SCRN MAMMOGRAM 2/14/2014 FOBT Q 1 YEAR AGE 50-75 2/14/2014 INFLUENZA AGE 9 TO ADULT 8/1/2016 HEMOGLOBIN A1C Q6M 10/4/2016 LIPID PANEL Q1 4/4/2017 Allergies as of 1/11/2017  Review Complete On: 1/11/2017 By: Adelina Maynard LPN No Known Allergies Current Immunizations  Reviewed on 4/4/2016 Name Date Influenza Vaccine 10/8/2015 Pneumococcal Polysaccharide (PPSV-23) 10/8/2015 Not reviewed this visit You Were Diagnosed With   
  
 Codes Comments Hemiplegia affecting right nondominant side (HCC)    -  Primary ICD-10-CM: G81.93 
ICD-9-CM: 342.92 Essential hypertension with goal blood pressure less than 130/80     ICD-10-CM: I10 
ICD-9-CM: 401.9 Impaired mobility and ADLs     ICD-10-CM: Z74.09 
ICD-9-CM: 799.89 History of CVA (cerebrovascular accident)     ICD-10-CM: Z86.73 
ICD-9-CM: V12.54 Cerebrovascular disease     ICD-10-CM: I67.9 ICD-9-CM: 437.9 Late effect of cerebrovascular accident     ICD-10-CM: I69.30 ICD-9-CM: 438.9 Vitals BP Pulse Temp Height(growth percentile) Weight(growth percentile) SpO2  
 (!) 168/97 70 98.9 °F (37.2 °C) (Oral) 5' 2\" (1.575 m) 208 lb 12.8 oz (94.7 kg) 99% BMI OB Status Smoking Status 38.19 kg/m2 Postmenopausal Former Smoker BMI and BSA Data Body Mass Index Body Surface Area  
 38.19 kg/m 2 2.04 m 2 Preferred Pharmacy Pharmacy Name Phone [facility] [facility] [phone] Your Updated Medication List  
  
   
This list is accurate as of: 1/11/17 10:23 AM.  Always use your most recent med list.  
  
  
  
  
 aspirin 81 mg chewable tablet Take 1 Tab by mouth daily. atorvastatin 80 mg tablet Commonly known as:  LIPITOR Take 1 Tab by mouth nightly. baclofen 10 mg tablet Commonly known as:  LIORESAL Take 1 Tab by mouth three (3) times daily. carvedilol 12.5 mg tablet Commonly known as:  Lori Golas Take 0.5 Tabs by mouth two (2) times daily (with meals). Indications: HYPERTENSION  
  
 cholecalciferol 1,000 unit tablet Commonly known as:  VITAMIN D3 Take 2 Tabs by mouth daily. furosemide 20 mg tablet Commonly known as:  LASIX Take  by mouth daily. JANUVIA 50 mg tablet Generic drug:  SITagliptin Take 50 mg by mouth daily. losartan 25 mg tablet Commonly known as:  COZAAR Take 1 Tab by mouth daily. Indications: HYPERTENSION  
  
 metFORMIN  mg tablet Commonly known as:  GLUCOPHAGE XR Take 800 mg by mouth two (2) times a day. Indications: type 2 diabetes mellitus Prescriptions Sent to Pharmacy Refills  
 baclofen (LIORESAL) 10 mg tablet 3 Sig: Take 1 Tab by mouth three (3) times daily. Class: Normal  
 Pharmacy: [facility] [address] [facility] Ph #: [phone] Route: Oral  
  
We Performed the Following REFERRAL TO PHYSICAL THERAPY [IHZ32 Custom] Comments: Please evaluate patient for physical therapy for gait training Follow-up Instructions Return in about 3 months (around 4/11/2017). Referral Information Referral ID Referred By Referred To  
  
 [id number] Shawna DOE Not Available Visits Status Start Date End Date 1 New Request 1/11/17 1/11/18 If your referral has a status of pending review or denied, additional information will be sent to support the outcome of this decision. Introducing [facility]! Dear Yvon Hoskins: Thank you for requesting a [employer] account. Our records indicate that you already have an active Sko[employer] account. You can access your account anytime at [email or web address] Did you know that you can access your hospital and ER discharge instructions at any time in Skoovy? You can also review all of your test results from your hospital stay or ER visit. Additional Information If you have questions, please visit the Frequently Asked Questions section of the Sko[employer] website at [email or web address] Remember, Skoovy is NOT to be used for urgent needs. For medical emergencies, dial 911. Now available from your iPhone and Android! Please provide this summary of care documentation to your next provider. Your primary care clinician is listed as [place]. If you have any questions after today's visit, please call [phone]. no

## 2024-04-15 NOTE — CONSULT NOTE ADULT - PROBLEM SELECTOR RECOMMENDATION 2
Patient likely started Citrucel which caused some diarrhea.  If patient continues to have constipation please start MiraLAX 17 g daily  Patient had recent colonoscopy which showed hyperplastic polyp, diverticulosis, hemorrhoids no need to repeat.

## 2024-04-15 NOTE — ED PROVIDER NOTE - ATTENDING APP SHARED VISIT CONTRIBUTION OF CARE
I personally saw the patient with the MELANIE, and completed the key components of the history and physical exam. I then discussed the management plan with the MELANIE.

## 2024-04-15 NOTE — ED ADULT NURSE NOTE - OBJECTIVE STATEMENT
pt a&ox3, vss, c/o epigastric pain x3 days w/ associated diarrhea, pt denies n/v or urinary symptoms  pt in NAD @ this time, respirations even and unlabored, meds gvn per rx, POC discussed w/ patient, will continue to reassess.

## 2024-04-16 ENCOUNTER — RESULT REVIEW (OUTPATIENT)
Age: 64
End: 2024-04-16

## 2024-04-16 ENCOUNTER — TRANSCRIPTION ENCOUNTER (OUTPATIENT)
Age: 64
End: 2024-04-16

## 2024-04-16 LAB
ANION GAP SERPL CALC-SCNC: 11 MMOL/L — SIGNIFICANT CHANGE UP (ref 5–17)
BLD GP AB SCN SERPL QL: SIGNIFICANT CHANGE UP
BUN SERPL-MCNC: 9.9 MG/DL — SIGNIFICANT CHANGE UP (ref 8–20)
CALCIUM SERPL-MCNC: 8.9 MG/DL — SIGNIFICANT CHANGE UP (ref 8.4–10.5)
CHLORIDE SERPL-SCNC: 103 MMOL/L — SIGNIFICANT CHANGE UP (ref 96–108)
CO2 SERPL-SCNC: 24 MMOL/L — SIGNIFICANT CHANGE UP (ref 22–29)
CREAT SERPL-MCNC: 0.77 MG/DL — SIGNIFICANT CHANGE UP (ref 0.5–1.3)
EGFR: 99 ML/MIN/1.73M2 — SIGNIFICANT CHANGE UP
GLUCOSE SERPL-MCNC: 94 MG/DL — SIGNIFICANT CHANGE UP (ref 70–99)
HCT VFR BLD CALC: 40.4 % — SIGNIFICANT CHANGE UP (ref 39–50)
HCV AB S/CO SERPL IA: 0.11 S/CO — SIGNIFICANT CHANGE UP (ref 0–0.99)
HCV AB SERPL-IMP: SIGNIFICANT CHANGE UP
HGB BLD-MCNC: 13 G/DL — SIGNIFICANT CHANGE UP (ref 13–17)
INR BLD: 1.04 RATIO — SIGNIFICANT CHANGE UP (ref 0.85–1.18)
MCHC RBC-ENTMCNC: 29.1 PG — SIGNIFICANT CHANGE UP (ref 27–34)
MCHC RBC-ENTMCNC: 32.2 GM/DL — SIGNIFICANT CHANGE UP (ref 32–36)
MCV RBC AUTO: 90.6 FL — SIGNIFICANT CHANGE UP (ref 80–100)
PLATELET # BLD AUTO: 224 K/UL — SIGNIFICANT CHANGE UP (ref 150–400)
POTASSIUM SERPL-MCNC: 4.1 MMOL/L — SIGNIFICANT CHANGE UP (ref 3.5–5.3)
POTASSIUM SERPL-SCNC: 4.1 MMOL/L — SIGNIFICANT CHANGE UP (ref 3.5–5.3)
PROTHROM AB SERPL-ACNC: 11.5 SEC — SIGNIFICANT CHANGE UP (ref 9.5–13)
RBC # BLD: 4.46 M/UL — SIGNIFICANT CHANGE UP (ref 4.2–5.8)
RBC # FLD: 14.2 % — SIGNIFICANT CHANGE UP (ref 10.3–14.5)
SODIUM SERPL-SCNC: 138 MMOL/L — SIGNIFICANT CHANGE UP (ref 135–145)
WBC # BLD: 4.96 K/UL — SIGNIFICANT CHANGE UP (ref 3.8–10.5)
WBC # FLD AUTO: 4.96 K/UL — SIGNIFICANT CHANGE UP (ref 3.8–10.5)

## 2024-04-16 PROCEDURE — 99223 1ST HOSP IP/OBS HIGH 75: CPT

## 2024-04-16 PROCEDURE — 88305 TISSUE EXAM BY PATHOLOGIST: CPT | Mod: 26

## 2024-04-16 PROCEDURE — 44361 SMALL BOWEL ENDOSCOPY/BIOPSY: CPT

## 2024-04-16 PROCEDURE — 99222 1ST HOSP IP/OBS MODERATE 55: CPT

## 2024-04-16 PROCEDURE — 43236 UPPR GI SCOPE W/SUBMUC INJ: CPT | Mod: 59

## 2024-04-16 PROCEDURE — 88342 IMHCHEM/IMCYTCHM 1ST ANTB: CPT | Mod: 26

## 2024-04-16 PROCEDURE — 99232 SBSQ HOSP IP/OBS MODERATE 35: CPT

## 2024-04-16 RX ORDER — MORPHINE SULFATE 50 MG/1
2 CAPSULE, EXTENDED RELEASE ORAL EVERY 4 HOURS
Refills: 0 | Status: DISCONTINUED | OUTPATIENT
Start: 2024-04-16 | End: 2024-04-17

## 2024-04-16 RX ORDER — SODIUM CHLORIDE 9 MG/ML
1000 INJECTION INTRAMUSCULAR; INTRAVENOUS; SUBCUTANEOUS
Refills: 0 | Status: DISCONTINUED | OUTPATIENT
Start: 2024-04-16 | End: 2024-04-17

## 2024-04-16 RX ADMIN — PANTOPRAZOLE SODIUM 40 MILLIGRAM(S): 20 TABLET, DELAYED RELEASE ORAL at 17:25

## 2024-04-16 RX ADMIN — SODIUM CHLORIDE 100 MILLILITER(S): 9 INJECTION INTRAMUSCULAR; INTRAVENOUS; SUBCUTANEOUS at 12:24

## 2024-04-16 RX ADMIN — MORPHINE SULFATE 2 MILLIGRAM(S): 50 CAPSULE, EXTENDED RELEASE ORAL at 12:23

## 2024-04-16 RX ADMIN — TRAMADOL HYDROCHLORIDE 25 MILLIGRAM(S): 50 TABLET ORAL at 22:36

## 2024-04-16 RX ADMIN — PANTOPRAZOLE SODIUM 40 MILLIGRAM(S): 20 TABLET, DELAYED RELEASE ORAL at 05:37

## 2024-04-16 RX ADMIN — MORPHINE SULFATE 2 MILLIGRAM(S): 50 CAPSULE, EXTENDED RELEASE ORAL at 12:57

## 2024-04-16 RX ADMIN — TRAMADOL HYDROCHLORIDE 25 MILLIGRAM(S): 50 TABLET ORAL at 23:30

## 2024-04-16 RX ADMIN — SODIUM CHLORIDE 100 MILLILITER(S): 9 INJECTION INTRAMUSCULAR; INTRAVENOUS; SUBCUTANEOUS at 17:25

## 2024-04-16 NOTE — CONSULT NOTE ADULT - ASSESSMENT
ASSESSMENT/ PLAN:  64M presenting with abdominal pain, found to have a mass in the 3rd portion of the duodenum. Patient has undergone EGD and bx with GI. No obvious signs of carcinoid syndrome, no fevers/night sweats. Decreased PO intake 2/2 fullness/discomfort though is tolerating diet.    Plan:  -no acute surgical intervention at this time  -patient may continue regular diet as tolerated, though should try to avoid bulky foods, raw vegetables  -encourage protein shakes, nutritional optimization  -depending on obstructive symptoms may require surgery  -will f/u pathology prior to definitive treatment plan  -may obtain CT chest w/ contrast during admission for completion of work up      Attending aware and agrees with plan

## 2024-04-16 NOTE — CONSULT NOTE ADULT - TIME BILLING
I spent 60 minutes reviewing the patient's chart, labs, imaging, interviewing and examining patient, and discussing plan of care with the patient, resident/PA team, and other providers, excluding separately billable procedures and teaching time.

## 2024-04-16 NOTE — CONSULT NOTE ADULT - SUBJECTIVE AND OBJECTIVE BOX
SURGERY CONSULT  ==============================================================================================================  HPI: 64y Male  HPI:   65 yo M /w hx HLD, BPH, and asthma presents with a chief complaint of worsening abdominal pain, diarrhea and chills. symptoms have been ongoing for months. no nausea/vomiting or fevers.  Pain is worsened with oral intake, with accompanying abdominal distention. Patient also reports heartburn for which he takes Mylanta. He does suffer from intermittent constipation and was taking a "white powder" OTC for constipation. Patient reports he had 2-3 episodes of dark diarrhea over this past weekend. Had a colonoscopy 9/23 w/ findings of stage II internal hemorrhoids, polypectomy (hyperplastic), and moderate diverticulosis of sigmoid colon. Never had an EGD  GI consulted for CT A/P with finding of a well-circumscribed homogeneously enhancing lesion arising from the wall of the distal third portion of the duodenum, strongly suspicious for carcinoid.   admission requested for egd/eus.    (15 Apr 2024 17:02)    SURGICAL ONCOLOGY CONSULT  64M with pmh HLD, BPH, asthma presents to ER with worsening abdominal pain. Patient reports that pain is intermittent and has been worsening in severity over the past several months. He describes the pain as midline just below the epigastrium. This pain is worsened with PO intake. Additionally, his PO intake has greatly decreased in volume, and he now "eats like a bird" per his daughter; when he does eat he feels a lot of pressure pushing upward. The daughter also mentions that she has noticed his energy level decrease progressively over the past 9-12 months and has noticed weight loss despite increasing protuberance of his abdomen. He denies N/V/CP/SOB, no subjective fever, +subjective chills.     CT on admission demonstrates ~2.5cm mass in the 3rd portion of the duodenum, suggestive of carcinoid tumor; not previously noted, but noted to be stable from scan obtained 7/2023. Has now been with GI for EGD and biopsy of mass, which extends into the lumen of the bowel, though does not appear obstructive.      PAST MEDICAL & SURGICAL HISTORY:  Enlarged prostate      HLD (hyperlipidemia)      Asthma      No significant past surgical history        Home Meds: Home Medications:    Allergies: Allergies    aspirin (Rash)    Intolerances      Soc:   Advanced Directives: Presumed Full Code     CURRENT MEDICATIONS:   --------------------------------------------------------------------------------------  Neurologic Medications  acetaminophen     Tablet .. 650 milliGRAM(s) Oral every 6 hours PRN Temp greater or equal to 38C (100.4F), Mild Pain (1 - 3), Moderate Pain (4 - 6)  morphine  - Injectable 2 milliGRAM(s) IV Push every 4 hours PRN breakthourhg pain  traMADol 25 milliGRAM(s) Oral three times a day PRN Severe Pain (7 - 10)    Respiratory Medications    Cardiovascular Medications    Gastrointestinal Medications  pantoprazole  Injectable 40 milliGRAM(s) IV Push two times a day  sodium chloride 0.9%. 1000 milliLiter(s) IV Continuous <Continuous>    Genitourinary Medications    Hematologic/Oncologic Medications    Antimicrobial/Immunologic Medications    Endocrine/Metabolic Medications    Topical/Other Medications    --------------------------------------------------------------------------------------    VITAL SIGNS, INS/OUTS (last 24 hours):  --------------------------------------------------------------------------------------  ICU Vital Signs Last 24 Hrs  T(C): 36.7 (16 Apr 2024 11:30), Max: 36.9 (15 Apr 2024 22:20)  T(F): 98.1 (16 Apr 2024 11:30), Max: 98.4 (15 Apr 2024 22:20)  HR: 69 (16 Apr 2024 11:30) (58 - 77)  BP: 124/71 (16 Apr 2024 11:30) (124/71 - 157/79)  BP(mean): --  ABP: --  ABP(mean): --  RR: 18 (16 Apr 2024 11:30) (18 - 18)  SpO2: 98% (16 Apr 2024 11:30) (96% - 98%)    O2 Parameters below as of 16 Apr 2024 11:30  Patient On (Oxygen Delivery Method): room air          I&O's Summary    --------------------------------------------------------------------------------------    EXAM:  Constitutional: NAD  HEENT: PERRL, no drainage or redness, anicteric  Respiratory: respirations even, unlabored on room air  Cardiovascular: RRR  Gastrointestinal: Soft, distended, TTP below epigastric area  Neurological: A&O x 3; no gross deficits  Psychiatric: Normal mood, normal affect  Musculoskeletal: No joint pain, swelling or deformity; no limitation of movement      LABS  --------------------------------------------------------------------------------------  Labs:  CAPILLARY BLOOD GLUCOSE                              13.0   4.96  )-----------( 224      ( 16 Apr 2024 03:14 )             40.4         04-16    138  |  103  |  9.9  ----------------------------<  94  4.1   |  24.0  |  0.77      Calcium: 8.9 mg/dL (04-16-24 @ 03:14)      LFTs:             6.8  | 0.2  | 51       ------------------[88      ( 15 Apr 2024 11:58 )  3.8  | x    | 82          Lipase:16     Amylase:x             Coags:     11.5   ----< 1.04    ( 16 Apr 2024 04:33 )     x                   Urinalysis Basic - ( 16 Apr 2024 03:14 )    Color: x / Appearance: x / SG: x / pH: x  Gluc: 94 mg/dL / Ketone: x  / Bili: x / Urobili: x   Blood: x / Protein: x / Nitrite: x   Leuk Esterase: x / RBC: x / WBC x   Sq Epi: x / Non Sq Epi: x / Bacteria: x          --------------------------------------------------------------------------------------    OTHER LABS    IMAGING RESULTS  < from: CT Abdomen and Pelvis w/ IV Cont (04.15.24 @ 13:03) >  INTERPRETATION:  CLINICAL INFORMATION: Diffuse abdominal pain.    COMPARISON: July 5, 2023.    CONTRAST/COMPLICATIONS:  IV Contrast: Omnipaque 350  90 cc administered   10 cc discarded  Oral Contrast: NONE  Complications: None reported at time of study completion    PROCEDURE:  CT of the Abdomen and Pelvis was performed.  Sagittal and coronal reformats were performed.    FINDINGS:  LOWER CHEST: Within normal limits.    LIVER: Stable scattered tiny hypoattenuating right liver lesions,   compatible with cysts. Otherwise, within normal limits.  BILE DUCTS: Normal caliber.  GALLBLADDER: Within normal limits.  SPLEEN: Within normal limits.  PANCREAS: Within normal limits.  ADRENALS: Within normal limits.  KIDNEYS/URETERS: Stable small bilateral renal cysts. Stable minimal   scarring in the posterior left kidney. Otherwise, within normal limits.    BLADDER: Within normal limits.  REPRODUCTIVE ORGANS: The prostate is not enlarged.    BOWEL: There is a stable 2.6 x 2.1 cm well-circumscribed homogeneously   enhancing lesion inseparable from the wall of the distal third portion of   the duodenum on image 57, strongly suspicious for carcinoid tumor. There   is no adjacent adenopathy. There is diverticulosis of the sigmoid colon.   There is no bowel obstruction. The appendix is unremarkable. Shotty   ileocolic lymph nodes.  PERITONEUM: No ascites.  VESSELS: Within normal limits.  RETROPERITONEUM/LYMPH NODES: No lymphadenopathy.  ABDOMINAL WALL: Within normal limits.  BONES: Within normal limits.    IMPRESSION: Well-circumscribed homogeneously enhancing lesion arising   from the wall of the distal third portion of the duodenum, strongly   suspicious for carcinoid, in retrospect stable from prior CT in July 2023. No evidence of regional adenopathy or metastatic disease. Recommend   GI consultation for endoscopic biopsy. Results discussed with Dr. Elizabeth   at time of interpretation.    < end of copied text >

## 2024-04-16 NOTE — CONSULT NOTE ADULT - ATTENDING COMMENTS
63 y/o M presenting with abdominal pain, found to have a mass in the 3rd portion of the duodenum.  Patient has undergone EGD and bx with GI.  No obvious signs of obstruction, carcinoid syndrome, no fevers/night sweats.  Decreased PO intake 2/2 fullness/discomfort though is tolerating diet.    AFVSS  Abdomen soft    Plan:  -no acute surgical intervention at this time  -patient may continue regular diet as tolerated, though should try to avoid bulky foods, raw vegetables  -encourage protein shakes, nutritional optimization  -will f/u pathology prior to definitive treatment plan  -may obtain CT chest w/ contrast during admission for completion of work up  - patient can follow up with me outpatient to discuss next steps, pending path. 65 y/o M presenting with abdominal pain, found to have a mass in the 3rd portion of the duodenum.  Patient has undergone EGD and bx with GI.  No obvious signs of obstruction, carcinoid syndrome, no fevers/night sweats.  Decreased PO intake 2/2 fullness/discomfort though is tolerating diet.    AFVSS  Abdomen soft    On EGD, looks suspicious for a GIST.    Plan:  -no acute surgical intervention at this time  -patient may continue regular diet as tolerated, though should try to avoid bulky foods, raw vegetables  -encourage protein shakes, nutritional optimization  -will f/u pathology prior to definitive treatment plan  -may obtain CT chest w/ contrast during admission for completion of work up  -patient can follow up with me outpatient to discuss next steps, pending path.

## 2024-04-16 NOTE — CONSULT NOTE ADULT - SUBJECTIVE AND OBJECTIVE BOX
Hematology Consult Note    HPI:   63 yo M /w hx HLD, BPH, and asthma presents with a chief complaint of worsening abdominal pain, diarrhea and chills. symptoms have been ongoing for months. no nausea/vomiting or fevers.  Pain is worsened with oral intake, with accompanying abdominal distention. Patient also reports heartburn for which he takes Mylanta. He does suffer from intermittent constipation and was taking a "white powder" OTC for constipation. Patient reports he had 2-3 episodes of dark diarrhea over this past weekend. Had a colonoscopy 9/23 w/ findings of stage II internal hemorrhoids, polypectomy (hyperplastic), and moderate diverticulosis of sigmoid colon. Never had an EGD  GI consulted for CT A/P with finding of a well-circumscribed homogeneously enhancing lesion arising from the wall of the distal third portion of the duodenum, strongly suspicious for carcinoid.   admission requested for egd/eus.    (15 Apr 2024 17:02)      Allergies    aspirin (Rash)    Intolerances        MEDICATIONS  (STANDING):  pantoprazole  Injectable 40 milliGRAM(s) IV Push two times a day    MEDICATIONS  (PRN):  acetaminophen     Tablet .. 650 milliGRAM(s) Oral every 6 hours PRN Temp greater or equal to 38C (100.4F), Mild Pain (1 - 3), Moderate Pain (4 - 6)  traMADol 25 milliGRAM(s) Oral three times a day PRN Severe Pain (7 - 10)      PAST MEDICAL & SURGICAL HISTORY:  Enlarged prostate      HLD (hyperlipidemia)      Asthma      No significant past surgical history          FAMILY HISTORY:  Family history of hypertension in father (Father)        SOCIAL HISTORY: No EtOH, no tobacco    REVIEW OF SYSTEMS:    CONSTITUTIONAL: No weakness, fevers or chills  EYES/ENT: No visual changes;  No vertigo or throat pain   NECK: No pain or stiffness  RESPIRATORY: No cough, wheezing, hemoptysis; No shortness of breath  CARDIOVASCULAR: No chest pain or palpitations  GASTROINTESTINAL: abd distention, early satiety, diarrhea.   GENITOURINARY: No dysuria, frequency or hematuria  NEUROLOGICAL: No numbness or weakness  SKIN: No itching, burning, rashes, or lesions   All other review of systems is negative unless indicated above.        T(F): 97.8 (04-16-24 @ 09:30), Max: 98.4 (04-15-24 @ 22:20)  HR: 65 (04-16-24 @ 09:30)  BP: 143/87 (04-16-24 @ 09:30)  RR: 18 (04-16-24 @ 09:30)  SpO2: 98% (04-16-24 @ 09:30)  Wt(kg): --    GENERAL: NAD, well-developed  HEAD:  Atraumatic, Normocephalic  EYES: EOMI, PERRLA, conjunctiva and sclera clear  NECK: Supple, No JVD  CHEST/LUNG: Clear to auscultation bilaterally; No wheeze  HEART: Regular rate and rhythm; No murmurs, rubs, or gallops  ABDOMEN: mildly distended and tender; Bowel sounds present  EXTREMITIES:  2+ Peripheral Pulses, No clubbing, cyanosis, or edema  NEUROLOGY: non-focal  SKIN: No rashes or lesions                          13.0   4.96  )-----------( 224      ( 16 Apr 2024 03:14 )             40.4       04-16    138  |  103  |  9.9  ----------------------------<  94  4.1   |  24.0  |  0.77    Ca    8.9      16 Apr 2024 03:14  Phos  2.7     04-15  Mg     2.2     04-15    TPro  6.8  /  Alb  3.8  /  TBili  0.2<L>  /  DBili  x   /  AST  51<H>  /  ALT  82<H>  /  AlkPhos  88  04-15      Magnesium: 2.2 mg/dL (04-15 @ 11:58)  Phosphorus: 2.7 mg/dL (04-15 @ 11:58)    < from: CT Abdomen and Pelvis w/ IV Cont (04.15.24 @ 13:03) >  ACC: [id number] EXAM:  CT ABDOMEN AND PELVIS IC   ORDERED BY: MINERVA JULIO     PROCEDURE DATE:  04/15/2024          INTERPRETATION:  CLINICAL INFORMATION: Diffuse abdominal pain.    COMPARISON: July 5, 2023.    CONTRAST/COMPLICATIONS:  IV Contrast: Omnipaque 350  90 cc administered   10 cc discarded  Oral Contrast: NONE  Complications: None reported at time of study completion    PROCEDURE:  CT of the Abdomen and Pelvis was performed.  Sagittal and coronal reformats were performed.    FINDINGS:  LOWER CHEST: Within normal limits.    LIVER: Stable scattered tiny hypoattenuating right liver lesions,   compatible with cysts. Otherwise, within normal limits.  BILE DUCTS: Normal caliber.  GALLBLADDER: Within normal limits.  SPLEEN: Within normal limits.  PANCREAS: Within normal limits.  ADRENALS: Within normal limits.  KIDNEYS/URETERS: Stable small bilateral renal cysts. Stable minimal   scarring in the posterior left kidney. Otherwise, within normal limits.    BLADDER: Within normal limits.  REPRODUCTIVE ORGANS: The prostate is not enlarged.    BOWEL: There is a stable 2.6 x 2.1 cm well-circumscribed homogeneously   enhancing lesion inseparable from the wall of the distal third portion of   the duodenum on image 57, strongly suspicious for carcinoid tumor. There   is no adjacent adenopathy. There is diverticulosis of the sigmoid colon.   There is no bowel obstruction. The appendix is unremarkable. Shotty   ileocolic lymph nodes.  PERITONEUM: No ascites.  VESSELS: Within normal limits.  RETROPERITONEUM/LYMPH NODES: No lymphadenopathy.  ABDOMINAL WALL: Within normal limits.  BONES: Within normal limits.    IMPRESSION: Well-circumscribed homogeneously enhancing lesion arising   from the wall of the distal third portion of the duodenum, strongly   suspicious for carcinoid, in retrospect stable from prior CT in July 2023. No evidence of regional adenopathy or metastatic disease. Recommend   GI consultation for endoscopic biopsy. Results discussed with Dr. Julio   at time of interpretation.    --- End of Report ---

## 2024-04-16 NOTE — CONSULT NOTE ADULT - NS ATTEND AMEND GEN_ALL_CORE FT
I attest my time as attending is greater than 50% of the total combined time spent on qualifying patient care activities by the PA/NP and attending.   I have made amendments to the documentation where necessary.
Patient seen with nurse practitioner  Patient with epigastric pain.  He has been having worsening pain for the past 6 months.  He has worsening regurgitation.  He takes Tums.  Does not take any other medications for this.  Symptoms became severe and he came to the emergency room.  He also has constipation.  His primary doctor gave him a "powder" and a orange bottle.  This was likely Citrucel.  This caused diarrhea.  No rectal bleeding.  Had colonoscopy in September 2023 which showed diverticulosis hemorrhoids and hyperplastic polyp.  CAT scan in the emergency was done which showed a small bowel lesion which is stable from July 2023.  He has not had evaluation for this.  Thought to be a carcinoid on the report from the radiologist.  Hemoglobin 13.7  CBC ordered for tomorrow  Hold Lovenox for possible biopsy during the enteroscopy    Enteroscopy to be done tomorrow

## 2024-04-16 NOTE — CONSULT NOTE ADULT - ASSESSMENT
incomplete     65 yo M /w hx HLD, BPH, and asthma presents with a chief complaint of worsening abdominal pain/ distention, diarrhea and chills  ongoing for months. CT A/P showing ? duodenal mass.    4/15/24- CT A/P-Stable scattered tiny hypoattenuating right liver lesions, compatible with cysts. stable 2.6 x 2.1 cm well-circumscribed homogeneously enhancing lesion inseparable from the wall of the distal third portion of the duodenum on image 57, strongly suspicious for carcinoid tumor when compared to CT A/P 7/5/2023. Shotty ileocolic lymph nodes.      #Duodenum lesion  -patient w. ongoing GI symptoms and chills for the past few months presenting to [facility] w. worsening abd pain. Patient denies fever, weight loss or night sweats   -patient s/p colonoscopy 9/2023 showing Hyperplastic polyp  -GI onboard planning for EGD/EUS 4/16    RECS  -CT-C to complete staging   -f/u BX 4/16  -Sx Onc eval       Thank you for the referral. Will continue to monitor the patient.  Please call with any questions [phone]  Above reviewed with Attending Dr. Velez   [facility]  [address] [place] [address]  [phone]  *Note not finalized until signed by Attending Physician       incomplete     63 yo M /w hx HLD, BPH, and asthma presents with a chief complaint of worsening abdominal pain/ distention, diarrhea and chills  ongoing for months. CT A/P showing ? duodenal mass.    4/15/24- CT A/P-Stable scattered tiny hypoattenuating right liver lesions, compatible with cysts. stable 2.6 x 2.1 cm well-circumscribed homogeneously enhancing lesion inseparable from the wall of the distal third portion of the duodenum on image 57, strongly suspicious for carcinoid tumor when compared to CT A/P 7/5/2023. Shotty ileocolic lymph nodes.      #Duodenum lesion  -patient w. ongoing GI symptoms and chills for the past few months presenting to [facility] w. worsening abd pain. Patient denies fever, weight loss or night sweats   -patient s/p colonoscopy 9/2023 showing Hyperplastic polyp  -GI onboard planning for EGD/EUS 4/16    RECS  -CT-C to complete staging   -f/u BX 4/16        Thank you for the referral. Will continue to monitor the patient.  Please call with any questions [phone]  Above reviewed with Attending Dr. Velez   [facility]  [address] [place] [address]  [phone]  *Note not finalized until signed by Attending Physician        63 yo M /w hx HLD, BPH, and asthma presents with a chief complaint of worsening abdominal pain/ distention, diarrhea and chills  ongoing for months. CT A/P showing ? duodenal mass.    4/15/24- CT A/P-Stable scattered tiny hypoattenuating right liver lesions, compatible with cysts. stable 2.6 x 2.1 cm well-circumscribed homogeneously enhancing lesion inseparable from the wall of the distal third portion of the duodenum on image 57, strongly suspicious for carcinoid tumor when compared to CT A/P 7/5/2023. Shotty ileocolic lymph nodes.      #Duodenum lesion  -patient w. ongoing GI symptoms and chills for the past few months presenting to [facility] w. worsening abd pain. Patient denies fever, weight loss or night sweats   -patient s/p colonoscopy 9/2023 showing Hyperplastic polyp  -GI onboard planning for EGD/EUS 4/16    RECS  -CT-C to complete staging   -f/u BX 4/16  -pending serotonin, 5HIAA   -Patient  can f/u OP with Heme/ONC Dr. Lee  when deemed stable for d/c- [facility] [place] [address] [place] [address] [phone]         Thank you for the referral.   Please call with any questions [phone]  Above reviewed with Attending Dr. Velez   [facility]  [address], [place] [address] [phone]  *Note not finalized until signed by Attending Physician        63 yo M /w hx HLD, BPH, and asthma presents with a chief complaint of worsening abdominal pain/ distention, diarrhea and chills  ongoing for months. CT A/P showing ? duodenal mass.    4/15/24- CT A/P-Stable scattered tiny hypoattenuating right liver lesions, compatible with cysts. stable 2.6 x 2.1 cm well-circumscribed homogeneously enhancing lesion inseparable from the wall of the distal third portion of the duodenum on image 57, strongly suspicious for carcinoid tumor when compared to CT A/P 7/5/2023. Shotty ileocolic lymph nodes.      #Duodenum lesion  -patient w. ongoing GI symptoms and chills for the past few months presenting to [facility] w. worsening abd pain. Patient denies fever, weight loss or night sweats   -patient s/p colonoscopy 9/2023 showing Hyperplastic polyp  -GI onboard planning for EGD/EUS 4/16    RECS  -CT-C to complete staging   -f/u BX 4/16  -ordered Chromogranin, serotonin,  Urinary 5HIAA ( random)   -Patient  can f/u OP with Heme/ONC Dr. Lee  when deemed stable for d/c- [facility] [place] [address] [place] [address] [phone]         Thank you for the referral.   Please call with any questions [phone]  Above reviewed with Attending Dr. Velez   [facility]  [address] [place] [address] [phone]  *Note not finalized until signed by Attending Physician

## 2024-04-16 NOTE — CONSULT NOTE ADULT - CONSULT REASON
2.6 x 2.1 cm well-circumscribed homogeneously   enhancing lesion inseparable from the wall of the distal third portion of   the duodenum
duodenal mass
?duodenal mass

## 2024-04-16 NOTE — PROGRESS NOTE ADULT - ASSESSMENT
63 yo M /w hx HLD, BPH, and asthma presents with a chief complaint of worsening abdominal pain, diarrhea and chills. symptoms have been ongoing for months. no nausea/vomiting or fevers.  Pain is worsened with oral intake, with accompanying abdominal distention. Patient also reports heartburn for which he takes Mylanta. He does suffer from intermittent constipation and was taking a "white powder" OTC for constipation. Patient reports he had 2-3 episodes of dark diarrhea over this past weekend. Had a colonoscopy 9/23 w/ findings of stage II internal hemorrhoids, polypectomy (hyperplastic), and moderate diverticulosis of sigmoid colon. Never had an EGD  GI consulted for CT A/P with finding of a well-circumscribed homogeneously enhancing lesion arising from the wall of the distal third portion of the duodenum, strongly suspicious for carcinoid.   admission requested for egd/eus.       abd pain/diarrhea  CT with duodenal mass     GI following     PPI BID      EGD/EUS today    hx BPH/asthma/HLD    denies any current medications

## 2024-04-17 ENCOUNTER — TRANSCRIPTION ENCOUNTER (OUTPATIENT)
Age: 64
End: 2024-04-17

## 2024-04-17 PROCEDURE — 85025 COMPLETE CBC W/AUTO DIFF WBC: CPT

## 2024-04-17 PROCEDURE — 99233 SBSQ HOSP IP/OBS HIGH 50: CPT

## 2024-04-17 PROCEDURE — 84100 ASSAY OF PHOSPHORUS: CPT

## 2024-04-17 PROCEDURE — 88305 TISSUE EXAM BY PATHOLOGIST: CPT

## 2024-04-17 PROCEDURE — 86316 IMMUNOASSAY TUMOR OTHER: CPT

## 2024-04-17 PROCEDURE — 86850 RBC ANTIBODY SCREEN: CPT

## 2024-04-17 PROCEDURE — 85027 COMPLETE CBC AUTOMATED: CPT

## 2024-04-17 PROCEDURE — 74177 CT ABD & PELVIS W/CONTRAST: CPT | Mod: MC

## 2024-04-17 PROCEDURE — 86900 BLOOD TYPING SEROLOGIC ABO: CPT

## 2024-04-17 PROCEDURE — 99239 HOSP IP/OBS DSCHRG MGMT >30: CPT

## 2024-04-17 PROCEDURE — 71260 CT THORAX DX C+: CPT | Mod: MC

## 2024-04-17 PROCEDURE — 83735 ASSAY OF MAGNESIUM: CPT

## 2024-04-17 PROCEDURE — 86901 BLOOD TYPING SEROLOGIC RH(D): CPT

## 2024-04-17 PROCEDURE — 88342 IMHCHEM/IMCYTCHM 1ST ANTB: CPT

## 2024-04-17 PROCEDURE — 85610 PROTHROMBIN TIME: CPT

## 2024-04-17 PROCEDURE — 36415 COLL VENOUS BLD VENIPUNCTURE: CPT

## 2024-04-17 PROCEDURE — 99232 SBSQ HOSP IP/OBS MODERATE 35: CPT

## 2024-04-17 PROCEDURE — T1013: CPT

## 2024-04-17 PROCEDURE — 83497 ASSAY OF 5-HIAA: CPT

## 2024-04-17 PROCEDURE — 80048 BASIC METABOLIC PNL TOTAL CA: CPT

## 2024-04-17 PROCEDURE — 80053 COMPREHEN METABOLIC PANEL: CPT

## 2024-04-17 PROCEDURE — 99285 EMERGENCY DEPT VISIT HI MDM: CPT

## 2024-04-17 PROCEDURE — 86803 HEPATITIS C AB TEST: CPT

## 2024-04-17 PROCEDURE — 84260 ASSAY OF SEROTONIN: CPT

## 2024-04-17 PROCEDURE — 83690 ASSAY OF LIPASE: CPT

## 2024-04-17 PROCEDURE — 71260 CT THORAX DX C+: CPT | Mod: 26

## 2024-04-17 RX ORDER — TRAMADOL HYDROCHLORIDE 50 MG/1
0.5 TABLET ORAL
Qty: 9 | Refills: 0
Start: 2024-04-17 | End: 2024-04-22

## 2024-04-17 RX ORDER — ACETAMINOPHEN 500 MG
3 TABLET ORAL
Qty: 0 | Refills: 0 | DISCHARGE
Start: 2024-04-17

## 2024-04-17 RX ORDER — ACETAMINOPHEN 500 MG
975 TABLET ORAL EVERY 8 HOURS
Refills: 0 | Status: DISCONTINUED | OUTPATIENT
Start: 2024-04-17 | End: 2024-04-17

## 2024-04-17 RX ORDER — PANTOPRAZOLE SODIUM 20 MG/1
1 TABLET, DELAYED RELEASE ORAL
Qty: 60 | Refills: 0
Start: 2024-04-17 | End: 2024-05-16

## 2024-04-17 RX ORDER — ACETAMINOPHEN 500 MG
2 TABLET ORAL
Qty: 80 | Refills: 0
Start: 2024-04-17 | End: 2024-04-26

## 2024-04-17 RX ADMIN — Medication 30 MILLILITER(S): at 04:19

## 2024-04-17 RX ADMIN — Medication 975 MILLIGRAM(S): at 14:42

## 2024-04-17 RX ADMIN — PANTOPRAZOLE SODIUM 40 MILLIGRAM(S): 20 TABLET, DELAYED RELEASE ORAL at 05:22

## 2024-04-17 RX ADMIN — Medication 975 MILLIGRAM(S): at 13:42

## 2024-04-17 RX ADMIN — Medication 650 MILLIGRAM(S): at 10:19

## 2024-04-17 RX ADMIN — Medication 650 MILLIGRAM(S): at 09:19

## 2024-04-17 NOTE — DISCHARGE NOTE PROVIDER - CARE PROVIDER_API CALL
Lanny Ellis  Gastroenterology  [address], [address]  [place] [address]  Phone: [phone]  Fax: [phone]  Follow Up Time: 1 week    Rosalino Crum  [facility] Surgical Oncology  [address] [place] [address]  Phone: [phone]  Fax: [phone]  Follow Up Time: 1 week

## 2024-04-17 NOTE — PROGRESS NOTE ADULT - TIME BILLING
I spent 40 minutes reviewing the patient's chart, labs, imaging, interviewing and examining patient, and discussing plan of care with the patient, resident/PA team, and other providers, excluding separately billable procedures and teaching time.

## 2024-04-17 NOTE — PROGRESS NOTE ADULT - PROBLEM SELECTOR PLAN 1
Patient still has upper abdominal pain which occurs after eating.  However symptoms are better.  Patient is on pantoprazole twice daily.  No GERD.  Tolerating solid diet

## 2024-04-17 NOTE — PROGRESS NOTE ADULT - NS ATTEST RISK PROBLEM GEN_ALL_CORE FT
Patient still has upper abdominal pain which occurs after eating.  However symptoms are better.  Patient is on pantoprazole twice daily.  No GERD.  Tolerating solid diet      Abdominal exam–positive bowel sounds.  Patient is some mild epigastric pain on palpation    Assessment and plan  Epigastric pain–likely due to GERD.  Pantoprazole twice daily  Tolerating solid diet    Patient with distal duodenal lesion.  Biopsies pending  Patient to follow-up with surgical oncology  Patient to follow-up with GI in 4 weeks  Daughter was on the phone this  and contributed to history  I reviewed surgery note–they suggested CT of the chest.  This was performed but has not been officially read  Hemoglobin is 13  I contacted Dr. Dumont  regarding the above plan

## 2024-04-17 NOTE — DISCHARGE NOTE NURSING/CASE MANAGEMENT/SOCIAL WORK - NSDCVIVACCINE_GEN_ALL_CORE_FT
Tdap; 26-Sep-2019 00:57; [place] (FLORESITA); [employer]; [id number] (Exp. Date: 26-Jul-2021); IntraMuscular; Deltoid Right.; 0.5 milliLiter(s); VIS (VIS Published: 09-May-2013, VIS Presented: 26-Sep-2019);

## 2024-04-17 NOTE — DISCHARGE NOTE PROVIDER - NSDCACTIVITY_GEN_ALL_CORE
· Had Zio patch at [facility] April 2021: 1   Overall sinus rhythm with average rate of 82 bpm, minimum of 66 bpm and maximum of 122 bpm    2   Frequent supraventricular ectopy-93,993 beats- 5 7% of total beats  3   130 brief supraventricular tachycardia runs-longest 22 1 seconds with an average rate of 140 bpm    4   Rare ventricular ectopy with no evidence of ventricular tachycardia  5   No evidence of significant pauses or high degree AV block  6   Patient symptoms corresponded to sinus rhythm, ventricular trigeminy, supraventricular ectopy and ventricular ectopy    · Monitor heart rate  · Continue outpatient follow-up No restrictions

## 2024-04-17 NOTE — PROGRESS NOTE ADULT - SUBJECTIVE AND OBJECTIVE BOX
Chief Complaint:  Patient is a 64y old  Male who presents with a chief complaint of abd pain/diarrhea     Follow up: abdominal pain, diarrhea                  CT+ lesion in duodenum                  s/p CT enterography     HPI/ 24 hr events: Patient seen and examined at bedside. He is s/p CT enterography yesterday which shows 2.5 cm nodule was noted in the third part of the duodenum and erythema in the stomach compatible with non-erosive gastritis. Daughter Meenu able to provide Slovenian translation via telephone. Pt feeling okay this morning. Endorses intermittent, severe epigastric abdominal pain that radiates to the back. Aggravated with palpation, eating and sometimes movement. Alleviated with pain medication, received morphine IVP and tramadol PO. Currently not having abdominal pain. Diet advanced this morning, had breakfast which he tolerated but started experiencing abdominal pain shortly after. Has been experiencing ~3 yellow liquid BM's daily. Denies nausea, vomiting, hematemesis, hematochezia, or melena. Vitals are overall stable.      REVIEW OF SYSTEMS:   General: Negative  HEENT: Negative  CV: Negative  Respiratory: Negative  GI: See HPI  : Negative  MSK: Negative  Hematologic: Negative  Skin: Negative    MEDICATIONS:   MEDICATIONS  (STANDING):  acetaminophen     Tablet .. 975 milliGRAM(s) Oral every 8 hours  pantoprazole  Injectable 40 milliGRAM(s) IV Push two times a day    MEDICATIONS  (PRN):  acetaminophen     Tablet .. 650 milliGRAM(s) Oral every 6 hours PRN Temp greater or equal to 38C (100.4F), Mild Pain (1 - 3), Moderate Pain (4 - 6)  aluminum hydroxide/magnesium hydroxide/simethicone Suspension 30 milliLiter(s) Oral every 6 hours PRN Dyspepsia  traMADol 25 milliGRAM(s) Oral three times a day PRN Severe Pain (7 - 10)            DIET:  Diet, Regular (24 @ 08:06) [Active]          ALLERGIES:   Allergies    aspirin (Rash)    Intolerances        VITAL SIGNS:   Vital Signs Last 24 Hrs  T(C): 36.9 (2024 07:54), Max: 37.1 (2024 00:00)  T(F): 98.5 (2024 07:54), Max: 98.7 (2024 00:00)  HR: 83 (2024 07:54) (80 - 83)  BP: 135/71 (2024 07:54) (113/72 - 142/71)  BP(mean): --  RR: 18 (2024 07:54) (17 - 18)  SpO2: 96% (2024 07:54) (93% - 96%)    Parameters below as of 2024 07:54  Patient On (Oxygen Delivery Method): room air      I&O's Summary      PHYSICAL EXAM:   GENERAL:  No acute distress  HEENT:  NC/AT, conjunctiva clear, sclera anicteric  CHEST:  No increased effort  HEART:  Regular rate  ABDOMEN:  Soft, +TTLP RUQ & LUQ, distended, positive bowel sounds, no rebound or guarding  EXTREMITIES: No edema  SKIN:  Warm, dry  NEURO:  Calm, cooperative    LABS:                        13.0   4.96  )-----------( 224      ( 2024 03:14 )             40.4     Hemoglobin: 13.0 g/dL (24 @ 03:14)  Hemoglobin: 13.7 g/dL (04-15-24 @ 11:58)    -    138  |  103  |  9.9  ----------------------------<  94  4.1   |  24.0  |  0.77    Ca    8.9      2024 03:14  Phos  2.7     04-15  Mg     2.2     04-15    TPro  6.8  /  Alb  3.8  /  TBili  0.2<L>  /  DBili  x   /  AST  51<H>  /  ALT  82<H>  /  AlkPhos  88  04-15    LIVER FUNCTIONS - ( 15 Apr 2024 11:58 )  Alb: 3.8 g/dL / Pro: 6.8 g/dL / ALK PHOS: 88 U/L / ALT: 82 U/L / AST: 51 U/L / GGT: x             PT/INR - ( 2024 04:33 )   PT: 11.5 sec;   INR: 1.04 ratio                 Hepatitis C Virus S/CO Ratio: 0.11 S/CO (24 @ 03:14)          RADIOLOGY & ADDITIONAL STUDIES:      ACC: [id number] EXAM:  CT ABDOMEN AND PELVIS IC   ORDERED BY: MINERVA JULIO     PROCEDURE DATE:  04/15/2024          INTERPRETATION:  CLINICAL INFORMATION: Diffuse abdominal pain.    COMPARISON: 2023.    CONTRAST/COMPLICATIONS:  IV Contrast: Omnipaque 350  90 cc administered   10 cc discarded  Oral Contrast: NONE  Complications: None reported at time of study completion    PROCEDURE:  CT of the Abdomen and Pelvis was performed.  Sagittal and coronal reformats were performed.    FINDINGS:  LOWER CHEST: Within normal limits.    LIVER: Stable scattered tiny hypoattenuating right liver lesions,   compatible with cysts. Otherwise, within normal limits.  BILE DUCTS: Normal caliber.  GALLBLADDER: Within normal limits.  SPLEEN: Within normal limits.  PANCREAS: Within normal limits.  ADRENALS: Within normal limits.  KIDNEYS/URETERS: Stable small bilateral renal cysts. Stable minimal   scarring in the posterior left kidney. Otherwise, within normal limits.    BLADDER: Within normal limits.  REPRODUCTIVE ORGANS: The prostate is not enlarged.    BOWEL: There is a stable 2.6 x 2.1 cm well-circumscribed homogeneously   enhancing lesion inseparable from the wall of the distal third portion of   the duodenum on image 57, strongly suspicious for carcinoid tumor. There   is no adjacent adenopathy. There is diverticulosis of the sigmoid colon.   There is no bowel obstruction. The appendix is unremarkable. Shotty   ileocolic lymph nodes.  PERITONEUM: No ascites.  VESSELS: Within normal limits.  RETROPERITONEUM/LYMPH NODES: No lymphadenopathy.  ABDOMINAL WALL: Within normal limits.  BONES: Within normal limits.    IMPRESSION: Well-circumscribed homogeneously enhancing lesion arising   from the wall of the distal third portion of the duodenum, strongly   suspicious for carcinoid, in retrospect stable from prior CT in 2023. No evidence of regional adenopathy or metastatic disease. Recommend   GI consultation for endoscopic biopsy. Results discussed with Dr. Julio   at time of interpretation.    --- End of Report ---      PIERRE HARMON MD; Attending Radiologist  This document has been electronically signed. Apr 15 2024  1:25PM  04-15-24 @ 13:03                      Enteroscopy Report        Date: 2024 3:21 PM        Patient Name: ANIKET JOSÉ        MRN: [id number]        Account Number:        [id number]        Gender: Male         (age): 1960 (64)        Instrument(s):        [facility] [place] [id number]        Attending/Fellow:        Sriram Lacy MD                Procedure Room #:        PROCEDURE ROOM 1        Referring Physician:        Mason Dawson MD        [address] [place] [address]                ASA Class:        P3 - 2024 4:02 PM Sriram Lacy MD        History of Present Illness:        The patient presents for abnormal CT abdomen for possible duodenal lesion        Administered Medications:        As per Anesthesiology Record        Indications:        Subepithelial lesion of duodenum - K31.9        Procedure:        The procedure, indications, preparation and potential complications were    explained to the patient, who indicated understanding and signed the    corresponding consent forms. MAC was administered by anesthesiologist.    Continuous pulse oximetry and blood pressure monitoring were used throughout the    procedure. Supplemental oxygen was used. Patient was placed in the left lateral    decubitus position. The endoscope was introduced through the mouth and advanced    under direct visualization until the whole duodenum was reached. Patient    tolerance to the procedure was good. The procedure was not difficult. Blood loss    was minimal.        Limitations/Complications:        There were no apparent limitations or complications        Findings:        Esophagus Mucosa Normal mucosa was noted in the whole esophagus.        Stomach Mucosa Diffuse erythema of the mucosa was noted in the stomach. These    findings are compatible with non-erosive gastritis.Cold forceps biopsies were    performed for histology in the stomach body and stomach antrum.        Duodenum Additional duodenum findings 2.5 cm nodule was noted in the third part    of the duodenum. On cold biopsy sampling, there was bleeding noted. Then distal    tattoo was placed for surgical reference.        Impressions:        Normal mucosa in the gastroesophageal junction.        Erythema in the stomach compatible with non-erosive gastritis. (Biopsy).        2.5 cm nodule was noted in the third part of the duodenum. On cold biopsy    sampling, there was bleeding noted. Then distal tattoo was placed for surgical    reference.        Plan:        Return to floor for further management        The patient is referred to a surgical oncologist for evaluation.        Specimens:        Jar # 1 :        in the stomach antrum and stomach body        Test(s) requested: Histology        Jar # 2 :        in the third part of the duodenum        Test(s) requested: Histology    Pathology:  Pathology was sent to lab, waiting for results    Sriram Lacy MD    Version 1, Electronically signed on 2024 4:07:48 PM by Sriram Lacy MD
HPI/OVERNIGHT EVENTS: Patient seen and examined at bedside this AM. states tolerates liquids, denies nausea or vomiting    Vital Signs Last 24 Hrs  T(C): 37 (17 Apr 2024 04:00), Max: 37.1 (17 Apr 2024 00:00)  T(F): 98.6 (17 Apr 2024 04:00), Max: 98.7 (17 Apr 2024 00:00)  HR: 81 (17 Apr 2024 04:00) (65 - 81)  BP: 113/72 (17 Apr 2024 04:00) (113/72 - 143/87)  BP(mean): --  RR: 18 (17 Apr 2024 04:00) (17 - 18)  SpO2: 93% (17 Apr 2024 04:00) (93% - 98%)    Parameters below as of 17 Apr 2024 04:00  Patient On (Oxygen Delivery Method): room air        I&O's Detail      Constitutional: patient resting comfortably in bed, in no acute distress  Constitutional: NAD  HEENT: PERRL, no drainage or redness, anicteric  Respiratory: respirations even, unlabored on room air  Cardiovascular: RRR  Gastrointestinal: Soft, distended, not significant tenderness  Neurological: A&O x 3; no gross deficits  Psychiatric: Normal mood, normal affect  Musculoskeletal: No joint pain, swelling or deformity; no limitation of movement    LABS:                        13.0   4.96  )-----------( 224      ( 16 Apr 2024 03:14 )             40.4     04-16    138  |  103  |  9.9  ----------------------------<  94  4.1   |  24.0  |  0.77    Ca    8.9      16 Apr 2024 03:14  Phos  2.7     04-15  Mg     2.2     04-15    TPro  6.8  /  Alb  3.8  /  TBili  0.2<L>  /  DBili  x   /  AST  51<H>  /  ALT  82<H>  /  AlkPhos  88  04-15    PT/INR - ( 16 Apr 2024 04:33 )   PT: 11.5 sec;   INR: 1.04 ratio           Urinalysis Basic - ( 16 Apr 2024 03:14 )    Color: x / Appearance: x / SG: x / pH: x  Gluc: 94 mg/dL / Ketone: x  / Bili: x / Urobili: x   Blood: x / Protein: x / Nitrite: x   Leuk Esterase: x / RBC: x / WBC x   Sq Epi: x / Non Sq Epi: x / Bacteria: x        MEDICATIONS  (STANDING):  pantoprazole  Injectable 40 milliGRAM(s) IV Push two times a day  sodium chloride 0.9%. 1000 milliLiter(s) (100 mL/Hr) IV Continuous <Continuous>    MEDICATIONS  (PRN):  acetaminophen     Tablet .. 650 milliGRAM(s) Oral every 6 hours PRN Temp greater or equal to 38C (100.4F), Mild Pain (1 - 3), Moderate Pain (4 - 6)  morphine  - Injectable 2 milliGRAM(s) IV Push every 4 hours PRN breakthourhg pain  traMADol 25 milliGRAM(s) Oral three times a day PRN Severe Pain (7 - 10)      MICRO:   Cultures     STUDIES:   EKG, CXR, U/S, CT, MRI   
  seen for duodenal mass    having some mid abd pain  no other gi complaitns  ros negative  bedside  utilized     MEDICATIONS  (STANDING):  pantoprazole  Injectable 40 milliGRAM(s) IV Push two times a day  sodium chloride 0.9%. 1000 milliLiter(s) (100 mL/Hr) IV Continuous <Continuous>    MEDICATIONS  (PRN):  acetaminophen     Tablet .. 650 milliGRAM(s) Oral every 6 hours PRN Temp greater or equal to 38C (100.4F), Mild Pain (1 - 3), Moderate Pain (4 - 6)  morphine  - Injectable 2 milliGRAM(s) IV Push every 4 hours PRN breakthourhg pain  traMADol 25 milliGRAM(s) Oral three times a day PRN Severe Pain (7 - 10)      Allergies    aspirin (Rash)      Vital Signs Last 24 Hrs  T(C): 36.7 (16 Apr 2024 11:30), Max: 36.9 (15 Apr 2024 22:20)  T(F): 98.1 (16 Apr 2024 11:30), Max: 98.4 (15 Apr 2024 22:20)  HR: 69 (16 Apr 2024 11:30) (58 - 77)  BP: 124/71 (16 Apr 2024 11:30) (124/71 - 157/79)  BP(mean): --  RR: 18 (16 Apr 2024 11:30) (17 - 18)  SpO2: 98% (16 Apr 2024 11:30) (96% - 98%)    Parameters below as of 16 Apr 2024 11:30  Patient On (Oxygen Delivery Method): room air        PHYSICAL EXAM:    GENERAL: NAD  CHEST/LUNG: Clear to ausculation bilaterally  HEART: Regular rate and rhythm; S1 S2  ABDOMEN: Soft,  Bowel sounds present  EXTREMITIES:  no edema   NERVOUS SYSTEM:  Alert & Oriented X3, Motor Strength 5/5 B/L upper and lower extremities  PSYCH: normal mood, appropriate response.    LABS:                        13.0   4.96  )-----------( 224      ( 16 Apr 2024 03:14 )             40.4     04-16    138  |  103  |  9.9  ----------------------------<  94  4.1   |  24.0  |  0.77    Ca    8.9      16 Apr 2024 03:14  Phos  2.7     04-15  Mg     2.2     04-15    TPro  6.8  /  Alb  3.8  /  TBili  0.2<L>  /  DBili  x   /  AST  51<H>  /  ALT  82<H>  /  AlkPhos  88  04-15    PT/INR - ( 16 Apr 2024 04:33 )   PT: 11.5 sec;   INR: 1.04 ratio           Urinalysis Basic - ( 16 Apr 2024 03:14 )    Color: x / Appearance: x / SG: x / pH: x  Gluc: 94 mg/dL / Ketone: x  / Bili: x / Urobili: x   Blood: x / Protein: x / Nitrite: x   Leuk Esterase: x / RBC: x / WBC x   Sq Epi: x / Non Sq Epi: x / Bacteria: x        CAPILLARY BLOOD GLUCOSE            RADIOLOGY & ADDITIONAL TESTS:

## 2024-04-17 NOTE — DISCHARGE NOTE PROVIDER - NSDCCPCAREPLAN_GEN_ALL_CORE_FT
PRINCIPAL DISCHARGE DIAGNOSIS  Diagnosis: Duodenal mass  Assessment and Plan of Treatment: Finding on CT a/p  S/p EGD with EUS/biopsy on 4/15  Please follow up with GI and surgical team on discharge for pathology results of biopsy as surgical intervention may be required  Contact info for GI and surgical team provided     PRINCIPAL DISCHARGE DIAGNOSIS  Diagnosis: Duodenal mass  Assessment and Plan of Treatment: Finding on cat scan  underwent EGD with EUS/biopsy on 4/15  Please follow up with GI and surgical team on discharge for pathology results of biopsy as surgical intervention may be required  Contact info for GI and surgical team provided  can use over the counter peptobismol or maalox for stomach pain as well.

## 2024-04-17 NOTE — DISCHARGE NOTE PROVIDER - HOSPITAL COURSE
63 yo M /w hx HLD, BPH, and asthma presents with a chief complaint of worsening abdominal pain, diarrhea and chills, symptoms have been ongoing for months.  In the ED, CT A/P with finding of a well-circumscribed homogeneously enhancing lesion arising from the wall of the distal third portion of the duodenum, strongly suspicious for carcinoid. GI was consulted. S/p EGD/EUS with biopsy on 4/16. Seen by surgical team, no intervention. Pt to follow up for path results on discharge. Diet advanced as tolerated.       65 yo M /w hx HLD, BPH, and asthma presents with a chief complaint of worsening abdominal pain, diarrhea and chills, symptoms have been ongoing for months.  In the ED, CT A/P with finding of a well-circumscribed homogeneously enhancing lesion arising from the wall of the distal third portion of the duodenum, strongly suspicious for carcinoid. GI was consulted. S/p EGD/EUS with biopsy on 4/16. Seen by surgical team, no intervention. CT chest was performed for completion of work up. CT chest with Indeterminate left upper lobe 3 mm nodule. Pt to follow up for path results on discharge with GI/surgery team and further care per them in outpatient setting. Diet advanced as tolerated.

## 2024-04-17 NOTE — PROGRESS NOTE ADULT - NS ATTEND AMEND GEN_ALL_CORE FT
Patient still has upper abdominal pain which occurs after eating.  However symptoms are better.  Patient is on pantoprazole twice daily.  No GERD.  Tolerating solid diet      Abdominal exam–positive bowel sounds.  Patient is some mild epigastric pain on palpation    Assessment and plan  Epigastric pain–likely due to GERD.  Pantoprazole twice daily  Tolerating solid diet    Patient with distal duodenal lesion.  Biopsies pending  Patient to follow-up with surgical oncology  Patient to follow-up with GI in 4 weeks  Daughter was on the phone this  and contributed to history

## 2024-04-17 NOTE — DISCHARGE NOTE NURSING/CASE MANAGEMENT/SOCIAL WORK - PATIENT PORTAL LINK FT
You can access the FollowMyHealth Patient Portal offered by [facility] by registering at the following website: [email or web address]. By joining [facility]’s FollowMyHealth portal, you will also be able to view your health information using other applications (apps) compatible with our system.

## 2024-04-17 NOTE — PROGRESS NOTE ADULT - ASSESSMENT
64y old male with PMHx HLD, BPH, and asthma who presents with a chief complaint of worsening abdominal pain. GI consulted for abnormal finding on CT.    Small bowel lesion  Abdominal pain  GERD  CT A/P w/ IVC (04.15.24): Well-circumscribed homogeneously enhancing lesion arising   from the wall of the distal third portion of the duodenum, strongly   suspicious for carcinoid  Last colonoscopy (09.2023) : moderate diverticulosis of sigmoid colon, stage II internal hemorrhoids, s/p polypectomy     CT enterography (4.16.24): 2.5 cm nodule was noted in the third part of the duodenum.  Erythema in the stomach compatible with non-erosive gastritis. (Biopsy).    - Diet as tolerated  - Continue PPI for GI mucosal protection. Can consider adding Carafate  - Recommend MiraLAX for bowel regimen if constipated  - Can follow up with outpatient GI Dr. Lanny Ellis in 2 weeks   - Follow up with outpatient SurgOnc Dr. Rosalino Crum once discharged  - Pain management as per primary team    ------------------------------------------------------------------------------------  Note is incomplete until final attending attestation 64y old male with PMHx HLD, BPH, and asthma who presents with a chief complaint of worsening abdominal pain. GI consulted for abnormal finding on CT.    Small bowel lesion  Abdominal pain  GERD  CT A/P w/ IVC (04.15.24): Well-circumscribed homogeneously enhancing lesion arising   from the wall of the distal third portion of the duodenum, strongly   suspicious for carcinoid  Last colonoscopy (09.2023) : moderate diverticulosis of sigmoid colon, stage II internal hemorrhoids, s/p polypectomy     CT enterography (4.16.24): 2.5 cm nodule was noted in the third part of the duodenum.  Erythema in the stomach compatible with non-erosive gastritis. (Biopsy).    - Diet as tolerated  - If tolerating diet no contraindication from GI standpoint for discharge  - Continue PPI for GI mucosal protection. Can consider adding Carafate  - Recommend MiraLAX for bowel regimen if constipated  - Can follow up with outpatient GI Dr. Lanny Ellis in 2 weeks   - Follow up with outpatient SurgOnc Dr. Rosalino Crum once discharged  - Pain management as per primary team    ------------------------------------------------------------------------------------  Note is incomplete until final attending attestation

## 2024-04-17 NOTE — DISCHARGE NOTE PROVIDER - NSDCMRMEDTOKEN_GEN_ALL_CORE_FT
acetaminophen 325 mg oral tablet: 2 tab(s) orally every 6 hours as needed for  moderate pain  acetaminophen 325 mg oral tablet: 3 tab(s) orally every 8 hours  Protonix 40 mg oral delayed release tablet: 1 tab(s) orally 2 times a day  traMADol 50 mg oral tablet: 0.5 tab(s) orally 3 times a day as needed for Severe Pain (7 - 10) MDD: 3 tabs   acetaminophen 325 mg oral tablet: 2 tab(s) orally every 6 hours as needed for  moderate pain  Protonix 40 mg oral delayed release tablet: 1 tab(s) orally 2 times a day  traMADol 50 mg oral tablet: 0.5 tab(s) orally 3 times a day as needed for Severe Pain (7 - 10) MDD: 3 tabs

## 2024-04-17 NOTE — DISCHARGE NOTE PROVIDER - PROVIDER TOKENS
PROVIDER:[TOKEN:[[id number]],FOLLOWUP:[1 week]],PROVIDER:[TOKEN:[[id number]],FOLLOWUP:[1 week]]

## 2024-04-17 NOTE — DISCHARGE NOTE PROVIDER - ATTENDING DISCHARGE PHYSICAL EXAMINATION:
aaox3 nad   rrr s1s2  ctab  soft abdomen periumbilical TTP +BS  no LE edema  nonfocal neuro  ambulatory  bedside  utilized. updated patient/wife at bedside and daughter over the phone

## 2024-04-17 NOTE — DISCHARGE NOTE PROVIDER - NSDCFUSCHEDAPPT_GEN_ALL_CORE_FT
Reji Granados  [facility]  [facility] [address]  Scheduled Appointment: 06/04/2024

## 2024-04-17 NOTE — PROGRESS NOTE ADULT - ASSESSMENT
64M presenting with abdominal pain, found to have a mass in the 3rd portion of the duodenum. Patient has undergone EGD and bx with GI. No obvious signs of carcinoid syndrome, no fevers/night sweats. Decreased PO intake 2/2 fullness/discomfort though is tolerating diet. recommend liquids with supplemenation if toelrates well can follow up as OP for resection pending pathology    Plan:  -no acute surgical intervention at this time  -patient may continue regular diet as tolerated, though should try to avoid bulky foods, raw vegetables  -encourage protein shakes, nutritional optimization  -depending on obstructive symptoms may require surgery however has not presented as suc yet  -will f/u pathology prior to definitive treatment plan  -may obtain CT chest w/ contrast during admission for completion of work up

## 2024-04-17 NOTE — PROGRESS NOTE ADULT - PROBLEM SELECTOR PLAN 3
Patient with a 2 cm lesion in the distal duodenum.  Biopsies taken.  Patient to follow-up with me in 4 week  Patient to follow-up with surgical oncology  May DC home from a GI standpoint.  Dr. Wyman

## 2024-04-17 NOTE — PROGRESS NOTE ADULT - ATTENDING COMMENTS
63 y/o M presenting with abdominal pain, found to have a mass in the 3rd portion of the duodenum.  Patient has undergone EGD and bx with GI.  No obvious signs of obstruction, carcinoid syndrome, no fevers/night sweats.  Decreased PO intake 2/2 fullness/discomfort though is tolerating diet.    AFVSS  Abdomen soft    On EGD, looks suspicious for a GIST.    Plan:  -no acute surgical intervention at this time  -patient may continue regular diet as tolerated, though should try to avoid bulky foods, raw vegetables  -encourage protein shakes, nutritional optimization  -will f/u pathology prior to definitive treatment plan  -may obtain CT chest w/ contrast during admission for completion of work up  -patient can follow up with me outpatient to discuss next steps, pending path.

## 2024-04-17 NOTE — DISCHARGE NOTE PROVIDER - CARE PROVIDERS DIRECT ADDRESSES
[email or web address]

## 2024-04-18 VITALS
DIASTOLIC BLOOD PRESSURE: 79 MMHG | OXYGEN SATURATION: 96 % | RESPIRATION RATE: 18 BRPM | HEART RATE: 63 BPM | TEMPERATURE: 99 F | SYSTOLIC BLOOD PRESSURE: 125 MMHG

## 2024-04-18 LAB — SURGICAL PATHOLOGY STUDY: SIGNIFICANT CHANGE UP

## 2024-04-19 LAB — CGA FLD-MCNC: 79.9 NG/ML — SIGNIFICANT CHANGE UP (ref 0–101.8)

## 2024-04-23 LAB — SEROTONIN SER-MCNC: 118 NG/ML — SIGNIFICANT CHANGE UP

## 2024-04-25 ENCOUNTER — APPOINTMENT (OUTPATIENT)
Dept: SURGICAL ONCOLOGY | Facility: CLINIC | Age: 64
End: 2024-04-25
Payer: MEDICAID

## 2024-04-25 VITALS
TEMPERATURE: 98.1 F | RESPIRATION RATE: 16 BRPM | WEIGHT: 164.25 LBS | HEIGHT: 66 IN | HEART RATE: 83 BPM | SYSTOLIC BLOOD PRESSURE: 116 MMHG | OXYGEN SATURATION: 98 % | BODY MASS INDEX: 26.4 KG/M2 | DIASTOLIC BLOOD PRESSURE: 82 MMHG

## 2024-04-25 PROCEDURE — 99204 OFFICE O/P NEW MOD 45 MIN: CPT

## 2024-04-25 NOTE — RESULTS/DATA
[FreeTextEntry1] : ***CT AP***04/15/24 FINDINGS: LIVER: Stable scattered tiny hypoattenuating right liver lesions, compatible with cysts. Otherwise, within normal limits. KIDNEYS/URETERS: Stable small bilateral renal cysts. Stable minimal scarring in the posterior left kidney. Otherwise, within normal limits.  BOWEL: There is a stable 2.6 x 2.1 cm well-circumscribed homogeneously enhancing lesion inseparable from the wall of the distal third portion of the duodenum on image 57, strongly suspicious for carcinoid tumor. There is no adjacent adenopathy. There is diverticulosis of the sigmoid colon. There is no bowel obstruction. The appendix is unremarkable. Shotty ileocolic lymph nodes.  IMPRESSION: Well-circumscribed homogeneously enhancing lesion arising from the wall of the distal third portion of the duodenum, strongly suspicious for carcinoid, in retrospect stable from prior CT in July 2023. No evidence of regional adenopathy or metastatic disease. Recommend GI consultation for endoscopic biopsy.  ***EGD BIOPSY*** 04/16/24 Specimen(s) Submitted 1  Random gastric biopsy 2  Small bowel Mass  Final Diagnosis 1.Gastric random, biopsy: - Gastric mucosa with reactive gastropathy. - Immunostains for H.Pylori are negative.  2.  Random bowel mass, biopsy: Small intestine mucosa with preserved villous architecture, negative for inflammation; no mass lesion present.  ***CT CHEST*** 04/17/24 FINDINGS:  AIRWAYS/LUNGS/PLEURA: Patent central airways. Left upper lobe 3 mm nodule (3:65). Bilateral lower lobe linear atelectasis. No pleural effusion.  UPPER ABDOMEN: Similar well-circumscribed enhancing duodenal mass measuring 2.6 x 2.1 cm (3:158).  IMPRESSION:  Indeterminate left upper lobe 3 mm nodule. Follow-up noncontrast CT chest can be obtained in 3 months to ensure stability. Similar enhancing duodenal mass, better characterized on recent CT abdomen pelvis.

## 2024-04-25 NOTE — HISTORY OF PRESENT ILLNESS
[de-identified] : Mr. Olivier is a 63 y/o M presenting for follow up for a duodenal mass.  He explains that he had a recent hospital admission for an initial C/C of worsening abdominal pain with symptoms of diarrhea and chills that has been on-going for about 6 months. During admission, his CT demonstrated an enhancing lesion arising from the distal 3rd of the duodenum. He underwent an endoscopy with Dr. Lacy, where the lesion was biopsied. This unfortunately has come back as duodenal architecture, not necessarily representative of the mass.  He denies any n/v. He has been able to tolerate food, but does have some pain with eating. His main complaint is bloating. Denies any uncontrollable diarrhea or flushing suspicious for carcinoid syndrome.

## 2024-04-25 NOTE — REASON FOR VISIT
[Initial Consultation] : an initial consultation for [Referred By: ___] : Referred By: [unfilled] [Spouse] : spouse [Family Member] : family member [FreeTextEntry2] : Duodenal lesion found on recent CT 4/15/24 and endoscopy

## 2024-04-25 NOTE — PHYSICAL EXAM
[Normal] : not distended, non tender, no masses, no hepatosplenomegaly [de-identified] : RRR [de-identified] : Easy WOB [de-identified] : mildly distended

## 2024-04-25 NOTE — ASSESSMENT
[FreeTextEntry1] : Mr. Olivier is a 63 y/o M who presents for evaluation and management of a duodenal mass. Unfortunately the biopsy came back as non-diagnostic, but imaging is suspicious for a carcinoid tumor or a GIST. No obvious lymphadenopathy noted in the surrounding area.  I explained that I would like to get a PET-Dotetate to evaluate for carcinoid tumor, as this could potentially change surgical management if there is metastatic disease or if a larger lymphadenectomy is needed. If it's a GIST, a more limited resection may be possible. He agrees with this plan.  Plan: 1) PET Dotetate 2) Surgical planning, needs med clearance, PSTs. Planning open small bowel resection  Rosalino Crum MD  Assistant Professor of Surgery [employer] [facility] [employer] at [facility] Division of Surgical Oncology [facility] Phone: [phone] Fax: [phone]  I spent 60 minutes reviewing the patient's chart, labs, imaging, interviewing and examining patient, and discussing plan of care with the patient, resident/PA team, and other providers, excluding separately billable procedures and teaching time.

## 2024-04-27 LAB
5OH-INDOLEACETATE UR-MCNC: 2 MG/G CREAT — SIGNIFICANT CHANGE UP
CREAT ?TM UR-MCNC: 170 MG/DL — SIGNIFICANT CHANGE UP (ref 20–320)

## 2024-04-30 NOTE — ASU PATIENT PROFILE, ADULT - INTERPRETER'S NAME
Cardiology Office Note      Primary/Referring Physician:   Brianne Sewell DO    Reason for Visit:  Felix Eli returns for an office visit in follow up of coronary artery disease.    History of Presenting Illness:  Felix Eli was hospitalized 10/22 - 10/24/23 with worsening exertional chest pain associated with tingling in his left arm and head, nausea, diaphoresis, and shortness of breath. Initial and subsequent troponins were unremarkable, EKG showed nonspecific changes.  Given history of CAD on cardiac CT, uncontrolled hypertension and a strong family history of heart disease, he proceeded with cardiac catheterization.  The latter noted multivessel disease with 90% mid LAD lesion which was stented to 0%.    On follow-up in October 2023, he was doing well from a cardiovascular standpoint, no longer experienced any exertional chest discomfort, shortness of breath, nausea or diaphoresis.  While contemplating, ultimately did not attend cardiac rehab; is not exercising and or active other than his work.  Previously, with blood pressure rise above 150 mmHg, he developed chest pressure; resumed Amlodipine/Benazepril 10/40 mg in October 2023.     On follow-up today, he admits to randomly occurring chest pressure; lasting a few minutes, had episode last week after activity/log collecting.  Also reports intermittent shortness of breath, particularly with lifting things; improved by changing most medications to p.m..  Unfortunately, due to intermittent lightheadedness, he adjusted Carvedilol to once daily.  In addition, he was discharged on Carvedilol 25 mg b.i.d. - which he was taking at time of follow-up in October 2023; is currently taking 12.5 mg daily.  Prior to hospitalization, Coreg dose was 12.5 mg b.i.d.    Otherwise, denies palpitations, lightheadedness or dizziness, claudication symptoms etc.     Problem List:    Coronary artery disease  Unstable angina - GURU LAD (2.5 x 16 Synergy), 10/23/2023  LCX 70%  distal at bifurcation with large PLB; mid RCA 40%   EF 64%, Echo 10/23/2023  Hypertension  Hyperlipidemia  CKD  Sleep apnea      Medications and Allergies:     Current Outpatient Medications   Medication Sig Dispense Refill    atorvastatin (LIPITOR) 40 MG tablet Take 1 tablet by mouth daily. 90 tablet 3    carvedilol (COREG) 25 MG tablet Take 1 tablet by mouth every 12 hours. (Patient taking differently: Take 25 mg by mouth daily.) 180 tablet 3    cloNIDine (CATAPRES) 0.1 MG tablet Take 1 tablet by mouth in the morning and 1 tablet in the evening. 180 tablet 3    clopidogrel (PLAVIX) 75 MG tablet Take 1 tablet by mouth daily. 90 tablet 3    fenofibrate (TRICOR) 145 MG tablet Take 1 tablet by mouth daily. 90 tablet 3    hydrochlorothiazide (HYDRODIURIL) 50 MG tablet Take 1 tablet by mouth daily. 90 tablet 3    amlodipine-benazepril (LOTREL) 10-40 MG per capsule TAKE 1 CAPSULE BY MOUTH DAILY 90 capsule 3    nitroGLYCERIN (NITROSTAT) 0.4 MG sublingual tablet PLACE 1 TABLET UNDER THE TONGUE EVERY 5 MINUTES AS NEEDED FOR CHEST PAIN (Patient not taking: Reported on 4/30/2024) 25 tablet 1    Baclofen 5 MG tablet TAKE 1 TABLET BY MOUTH EVERY NIGHT 90 tablet 0    Aspirin Low Dose 81 MG EC tablet TAKE 1 TABLET BY MOUTH DAILY 90 tablet 3    acetaminophen (TYLENOL) 500 MG tablet Take 1,000 mg by mouth every 6 hours as needed for Pain. (Patient not taking: Reported on 4/30/2024)       No current facility-administered medications for this visit.      ALLERGIES:  No Known Allergies      Social History: Single; no alcohol or smoking. Works weight management.  No regular exercise    Family History:  Positive for premature coronary artery disease in father and brother.  Sister has hypertension.    Review of Systems:  As in History of Present Illness. All remaining systems are reviewed and are negative.    Physical Exam:   Visit Vitals  /78 (BP Location: LUE - Left upper extremity, Patient Position: Sitting, Cuff Size: Large  Adult)   Pulse 78   Wt 111.6 kg (246 lb)   BMI 36.33 kg/m²     Wt Readings from Last 4 Encounters:   04/30/24 111.6 kg (246 lb)   04/30/24 111.6 kg (246 lb)   10/31/23 111.1 kg (245 lb)   10/24/23 104.7 kg (230 lb 13.2 oz)     General:  Well developed and nourished. Comfortable.    Neck:  Carotid upstrokes are normal. No bruits.  Cardiovascular:  Normal S1 and S2; regular rhythm; no murmurs, gallops or rubs.  Lungs/Chest:  Clear to auscultation. Respiratory effort is normal.  Abdomen:  Soft and non tender. Bowel sounds are normal.   Extremities:  No clubbing, cyanosis or edema.    Pulses:  Normal dorsalis pedis and posterior tibial pulses.   Skin:  Warm, dry and intact. No rash or ulceration noted.  Neurological:  Patient is alert and oriented. No focal neurological deficits.  Psychiatric:  Mood and affect are normal.     Recent Labs   Lab 11/08/23  0956 10/23/23  0630   Cholesterol 94 98   HDL 32* 29*   CALCLDL 43 36   Triglycerides 95 163*   Non-HDL Cholesterol 62 69     Recent Labs   Lab 10/24/23  0551 10/23/23  1626 10/23/23  0630 10/22/23  2103   Sodium 139  --  141 139   Potassium 4.1 4.4 3.5 3.5   Chloride 105  --  103 99   Creatinine 1.32*  --  1.31* 1.32*   BUN 17  --  18 19   Glucose 103*  --  112* 143*   Calcium 8.8  --  8.8 9.7   NT-proBNP  --   --   --  119       Cardiovascular Diagnostics:  Reviewed  EKG 10/22/2023:  Sinus tachycardia; first-degree AV block; incomplete RBBB    Echo 10/23/2023:    * Normal left ventricular size and systolic function, EF 64 %, GLS -20.6 %.    * Normal right ventricular systolic function.    * No pericardial effusion.    * No significant valve abnormalities.    * No significant change since the prior study.    [facility] 10/23/2023:  Left main is normal angiographic  LAD 90% mid lesion stented to 0%   CX 70% distal at bifurcation with large PLB  RCA 40% mid                 Assessment:  Coronary artery disease:  Complaining of random chest pressure and shortness of breath,  lightheadedness; no regular exercise; improper carvedilol dosing  Hypertension:  BP on reassessment 124/64; was 110/66 this morning at Dr. Sewell's office. Brief orthostatic assessment - BP carlos from 124 mmHg  to 140 mmHg standing; asymptomatic; no change in heart rate.  Continue Lotrel 10/40 mg daily, HCTZ 50 mg daily; Carvedilol - current dose 12.5 mg daily - increases to 12.5 mg b.i.d. and possibly to 25 mg b.i.d. as indicated  Clonidine 0.1 mg b.i.d. maybe contributing to his lightheadedness - dose decrease to 0.1 mg q.h.s.  Hyperlipidemia:  LDL has historically been reasonably well controlled, currently 36, HDL 29  Sleep apnea:  CPAP since 4/2023    Recommendation/Plan:   Continue present medications except reduce clonidine to 0.1 mg q.h.s. and increase Carvedilol to prior dose 25 mg b.i.d..  Advised to monitor blood pressures regularly  Advised to remain active and start walking regularly.  Fasting lipids in October 2024  Will consider NM stress test in October 2024 if he continues to experience chest pain or shortness of breath; particularly with physical activity  Follow up in 6 months or sooner if indicated.    Thank you for allowing me to participate in your patient's care.    MARIA C Page    Addendum:  Spoke with pharmacy; Carvedilol was filled in October 2023 at 25 mg b.i.d., #30; 0 refills  No further Carvedilol was picked up at the pharmacy until April 11, 2024 - 12.5 mg b.i.d.; # 60  As noted above, has only been taking 12.5 mg daily  Today, Carvedilol was refilled by Dr. Sewell at 25 mg b.i.d. - patient was unaware of dose increase  Discussed with patient  He can not explain the carvedilol discrepancy except to say he had “left over carvedilol” at home and thought he had filled medication regularly  Other than clonidine, he is fairly compliant with all other medications  Reiterated the above medication changes - he verbalized understanding  Phone follow-up in 3-4 weeks on BP/overall status       Ju

## 2024-05-01 ENCOUNTER — APPOINTMENT (OUTPATIENT)
Dept: INTERNAL MEDICINE | Facility: CLINIC | Age: 64
End: 2024-05-01
Payer: MEDICAID

## 2024-05-01 VITALS
RESPIRATION RATE: 12 BRPM | DIASTOLIC BLOOD PRESSURE: 78 MMHG | HEART RATE: 82 BPM | SYSTOLIC BLOOD PRESSURE: 124 MMHG | WEIGHT: 164 LBS | HEIGHT: 66 IN | BODY MASS INDEX: 26.36 KG/M2

## 2024-05-01 DIAGNOSIS — R91.1 SOLITARY PULMONARY NODULE: ICD-10-CM

## 2024-05-01 PROCEDURE — G2211 COMPLEX E/M VISIT ADD ON: CPT | Mod: NC,1L

## 2024-05-01 PROCEDURE — 99214 OFFICE O/P EST MOD 30 MIN: CPT

## 2024-05-01 RX ORDER — ALBUTEROL SULFATE 90 UG/1
108 (90 BASE) INHALANT RESPIRATORY (INHALATION) 3 TIMES DAILY
Qty: 1 | Refills: 0 | Status: ACTIVE | COMMUNITY
Start: 2023-11-14 | End: 1900-01-01

## 2024-05-01 RX ORDER — FLUTICASONE PROPIONATE AND SALMETEROL 250; 50 UG/1; UG/1
250-50 POWDER RESPIRATORY (INHALATION) TWICE DAILY
Qty: 1 | Refills: 0 | Status: ACTIVE | COMMUNITY
Start: 2022-11-11 | End: 1900-01-01

## 2024-05-01 RX ORDER — METHYLPREDNISOLONE 4 MG/1
4 TABLET ORAL
Qty: 1 | Refills: 1 | Status: ACTIVE | COMMUNITY
Start: 2024-05-01 | End: 1900-01-01

## 2024-05-01 NOTE — ASSESSMENT
[FreeTextEntry1] : asthma flare up steroids, inhalers doxycyline will need pet scan to rule neuroendocrine tumor of small bowel lung nodule on ct repeat in future patient reassured surgery planned after pet scan

## 2024-05-01 NOTE — HISTORY OF PRESENT ILLNESS
[FreeTextEntry8] : cough history of asthma has recent gi work up for mass duodenum possible carcinoid no chest pain but feels wheezing

## 2024-05-08 ENCOUNTER — APPOINTMENT (OUTPATIENT)
Dept: NUCLEAR MEDICINE | Facility: CLINIC | Age: 64
End: 2024-05-08
Payer: MEDICAID

## 2024-05-08 PROCEDURE — A9592: CPT

## 2024-05-08 PROCEDURE — 78815 PET IMAGE W/CT SKULL-THIGH: CPT | Mod: PI

## 2024-05-14 ENCOUNTER — APPOINTMENT (OUTPATIENT)
Dept: GASTROENTEROLOGY | Facility: CLINIC | Age: 64
End: 2024-05-14
Payer: MEDICAID

## 2024-05-14 VITALS
SYSTOLIC BLOOD PRESSURE: 155 MMHG | BODY MASS INDEX: 26.36 KG/M2 | RESPIRATION RATE: 14 BRPM | DIASTOLIC BLOOD PRESSURE: 92 MMHG | OXYGEN SATURATION: 98 % | HEART RATE: 95 BPM | HEIGHT: 66 IN | WEIGHT: 164 LBS

## 2024-05-14 DIAGNOSIS — J45.30 MILD PERSISTENT ASTHMA, UNCOMPLICATED: ICD-10-CM

## 2024-05-14 DIAGNOSIS — R12 HEARTBURN: ICD-10-CM

## 2024-05-14 DIAGNOSIS — Z12.11 ENCOUNTER FOR SCREENING FOR MALIGNANT NEOPLASM OF COLON: ICD-10-CM

## 2024-05-14 DIAGNOSIS — K64.4 RESIDUAL HEMORRHOIDAL SKIN TAGS: ICD-10-CM

## 2024-05-14 DIAGNOSIS — N32.9 BLADDER DISORDER, UNSPECIFIED: ICD-10-CM

## 2024-05-14 DIAGNOSIS — K59.00 CONSTIPATION, UNSPECIFIED: ICD-10-CM

## 2024-05-14 DIAGNOSIS — K64.9 UNSPECIFIED HEMORRHOIDS: ICD-10-CM

## 2024-05-14 DIAGNOSIS — R10.30 LOWER ABDOMINAL PAIN, UNSPECIFIED: ICD-10-CM

## 2024-05-14 DIAGNOSIS — Z57.9 MILD PERSISTENT ASTHMA, UNCOMPLICATED: ICD-10-CM

## 2024-05-14 DIAGNOSIS — Z12.12 ENCOUNTER FOR SCREENING FOR MALIGNANT NEOPLASM OF COLON: ICD-10-CM

## 2024-05-14 PROCEDURE — 99213 OFFICE O/P EST LOW 20 MIN: CPT

## 2024-05-14 SDOH — HEALTH STABILITY - PHYSICAL HEALTH: OCCUPATIONAL EXPOSURE TO UNSPECIFIED RISK FACTOR: Z57.9

## 2024-05-15 PROBLEM — Z12.11 SCREENING FOR COLORECTAL CANCER: Status: ACTIVE | Noted: 2019-06-01

## 2024-05-15 PROBLEM — R10.30 LOWER ABDOMINAL PAIN OF UNKNOWN ETIOLOGY: Status: ACTIVE | Noted: 2023-06-30

## 2024-05-15 PROBLEM — K59.00 CONSTIPATION, UNSPECIFIED CONSTIPATION TYPE: Status: ACTIVE | Noted: 2023-11-14

## 2024-05-15 PROBLEM — K64.9 HEMORRHOID: Status: ACTIVE | Noted: 2019-05-10

## 2024-05-15 PROBLEM — N32.9 BLADDER DISORDER: Status: ACTIVE | Noted: 2017-05-12

## 2024-05-15 PROBLEM — R12 HEARTBURN: Status: ACTIVE | Noted: 2023-11-14

## 2024-05-15 PROBLEM — K64.4 EXTERNAL HEMORRHOIDS: Status: ACTIVE | Noted: 2019-01-09

## 2024-05-15 PROBLEM — J45.30: Status: ACTIVE | Noted: 2017-10-13

## 2024-05-15 NOTE — REASON FOR VISIT
[Follow-up] : a follow-up of an existing diagnosis [FreeTextEntry1] : Recent EGD/enteroscopy by ESTELA at [facility].

## 2024-05-15 NOTE — HISTORY OF PRESENT ILLNESS
[FreeTextEntry1] : 64-year-old male non-English speaking accompanied by wife who is partial .  YE served as . Past medical history of hyperlipidemia and dysuria.  Alleged history of colon polyps.  Patient's last colonoscopy was in 9/23 by Dr. Barker, rectal and ascending colon hyperplastic polyps identified.  Recently admitted to [facility] in April/2024 for vague diffuse abdominal pain.  ER CAT scan identified a 2.5 cm mass in the third portion of duodenum.  Enteroscopy performed on 4/16/2024 by ESTELA.  Esophagus and stomach were normal.  Gastric biopsies were negative.  Friable mass was noted in D3.  Biopsies caused significant bleeding.  Biopsies were nondiagnostic.  Normal overlying duodenal mucosa.  No tumor identified. Site of lesion was tattooed on distal side. Patient subsequently was referred for a PET CT scan performed on 5/8 with special contrast agent specific for neuroendocrine tumors.  Lesion was seen on CT scan but was negative for uptake of nuclear contrast agent.  Thus negative for neuroendocrine tumor. Patient has recently been seen by surgical oncology and is due for follow-up next week.  Current symptoms include vague upper abdominal pain.  No bleeding.  No vomiting.  No weight loss.  No bowel obstruction.  No nocturnal symptoms.  No back pain. Case discussed with Dr. Sriram Lacy.  Lesion was large and diffusely infiltrating the wall of the duodenum.  This lesion is not endoscopically resectable.  Patient needs surgical excision.

## 2024-05-15 NOTE — ASSESSMENT
[FreeTextEntry1] : Vague abdominal pain.  CAT scan finding of mass in third portion of duodenum.  Mass confirmed by enteroscopy.  Unfortunately endoscopic biopsies are nondiagnostic.  By description lesion is broad-based and diffusely infiltrating the wall of the duodenum.  Lesion is not endoscopically resectable. Recent imaging shows no evidence of metastatic disease. Recent colonoscopy was negative except for innocent hyperplastic polyps. Advised to return to surgical oncology for consideration of surgical intervention. GI office follow-up on as-needed basis.

## 2024-05-23 ENCOUNTER — APPOINTMENT (OUTPATIENT)
Dept: SURGICAL ONCOLOGY | Facility: CLINIC | Age: 64
End: 2024-05-23
Payer: MEDICAID

## 2024-05-23 VITALS
OXYGEN SATURATION: 96 % | HEART RATE: 96 BPM | BODY MASS INDEX: 26.85 KG/M2 | HEIGHT: 66 IN | SYSTOLIC BLOOD PRESSURE: 131 MMHG | DIASTOLIC BLOOD PRESSURE: 82 MMHG | WEIGHT: 167.06 LBS | TEMPERATURE: 97.2 F

## 2024-05-23 DIAGNOSIS — K63.9 DISEASE OF INTESTINE, UNSPECIFIED: ICD-10-CM

## 2024-05-23 PROCEDURE — 99214 OFFICE O/P EST MOD 30 MIN: CPT

## 2024-05-23 NOTE — PHYSICAL EXAM
[Normal] : oriented to person, place and time, with appropriate affect [de-identified] : RRR [de-identified] : Easy WOB [de-identified] : mildly distended

## 2024-05-23 NOTE — REASON FOR VISIT
[Follow-Up Visit] : a follow-up visit for [Spouse] : spouse [Family Member] : family member [FreeTextEntry2] : Here to discuss surgery for small bowel mass, differential includes GIST

## 2024-05-23 NOTE — HISTORY OF PRESENT ILLNESS
[de-identified] : Mr. Olivier is a 63 y/o Male with PMHx of anxiety, asthma, BPH, cognitive impairment, H:D (mixed), STD, and is presenting for follow up for a duodenal mass suspicious for GIST.   He explains that he had a hospital admission for C/C of worsening abdominal pain with symptoms of diarrhea and chills, on-going for about 6 months. During admission, CT demonstrated an enhancing lesion arising from the distal 3rd of the duodenum. Patient underwent an endoscopy with Dr. Lacy, where the lesion was biopsied. Biopsy pathology was indeterminate, however differential still supports GIST.  He denies any n/v/f/c/d. He has been able to tolerate food, but does have some pain with eating. Pain waxes and wanes. Denies any uncontrollable diarrhea or flushing suspicious for carcinoid syndrome. He had a PET-Dotatate which did not show any PET avidity consistent with a NET. Patient inquires about location of the tumor and whether it is affecting other organs.

## 2024-05-23 NOTE — RESULTS/DATA
[FreeTextEntry1] : PET/CT Dotatate - 05/15/2024: IMPRESSION: 1. Stable distal duodenal mass without abnormal activity and therefore not a SSR-2 bearing NET. No evidence of SSR-2 bearing tumor in the remaining field of view. - Physiologic radiotracer activity in many of the bowel loops. - Although there is physiologic activity in the distal duodenum/proximal jejunum, the distal duodenal mass is well seen (image 280) measuring approximately 2.7 x 2.0 cm on low dose CT without abnormal FDG activity (measured 2.6 x 2.1 cm on the most recent CT). - Diverticulosis, no suggestion of diverticulitis. 2. Photopenic subcentimeter nonavid left upper lobe nodule below PET detection.   ***CT AP***04/15/24 FINDINGS: LIVER: Stable scattered tiny hypoattenuating right liver lesions, compatible with cysts. Otherwise, within normal limits. KIDNEYS/URETERS: Stable small bilateral renal cysts. Stable minimal scarring in the posterior left kidney. Otherwise, within normal limits.  BOWEL: There is a stable 2.6 x 2.1 cm well-circumscribed homogeneously enhancing lesion inseparable from the wall of the distal third portion of the duodenum on image 57, strongly suspicious for carcinoid tumor. There is no adjacent adenopathy. There is diverticulosis of the sigmoid colon. There is no bowel obstruction. The appendix is unremarkable. Shotty ileocolic lymph nodes.  IMPRESSION: Well-circumscribed homogeneously enhancing lesion arising from the wall of the distal third portion of the duodenum, strongly suspicious for carcinoid, in retrospect stable from prior CT in July 2023. No evidence of regional adenopathy or metastatic disease. Recommend GI consultation for endoscopic biopsy.  ***EGD BIOPSY*** 04/16/24 Specimen(s) Submitted 1  Random gastric biopsy 2  Small bowel Mass  Final Diagnosis 1.Gastric random, biopsy: - Gastric mucosa with reactive gastropathy. - Immunostains for H.Pylori are negative.  2.  Random bowel mass, biopsy: Small intestine mucosa with preserved villous architecture, negative for inflammation; no mass lesion present.  ***CT CHEST*** 04/17/24 FINDINGS:  AIRWAYS/LUNGS/PLEURA: Patent central airways. Left upper lobe 3 mm nodule (3:65). Bilateral lower lobe linear atelectasis. No pleural effusion.  UPPER ABDOMEN: Similar well-circumscribed enhancing duodenal mass measuring 2.6 x 2.1 cm (3:158).  IMPRESSION:  Indeterminate left upper lobe 3 mm nodule. Follow-up noncontrast CT chest can be obtained in 3 months to ensure stability. Similar enhancing duodenal mass, better characterized on recent CT abdomen pelvis.

## 2024-05-23 NOTE — ASSESSMENT
[FreeTextEntry1] : Mr. Olivier is a 63 y/o M who presents for evaluation and management of a duodenal mass. Unfortunately the biopsy came back as non-diagnostic, but imaging is suspicious for a GIST. No obvious lymphadenopathy noted in the surrounding area, and PET Dotetate negative.  I had a long discussion with the patient and his family and we will plan to pursue surgical resection of this lesion. Given its location, I will plan for an open partial small bowel resection if amenable vs possible partial small bowel resection with primary closure of duodenum.   The surgery will last 4-6 hours with a hospital stay of about 1-3 days, more if there are complications. We discussed that the most common complaint after this procedure is fatigue followed by poor appetite. It will take approximately 8-12 weeks to feel back to normal after the operation. The risks, benefits, and details of the operation were discussed. These include bleeding, infection, damage to surrounding structures, cardiopulmonary complications, and the risks of anesthesia. I discussed that potential post-operative treatments including chemotherapy/immunotherapy/targeted therapy will be determined by final pathology.   All questions and concerns were addressed. Patient and family vocalized understanding and agreement to assessment and treatment plan.  Our office will contact the patient to schedule.   Plan: 1) Surgical planning, needs med clearance, PSTs.  2) Planning open small bowel resection  Rosalino Crum MD  Assistant Professor of Surgery [employer] [facility] [employer] [facility] Division of Surgical Oncology [facility] Phone: [phone] Fax: [phone]  I spent 46 minutes reviewing the patient's chart, labs, imaging, interviewing and examining patient, and discussing plan of case with the patient, resident/PA team, and other providers, excluding separately billable procedures and teaching time.  This note was written by Meravt Alicea on 05/22/2024, acting solely as a scribe for Dr. Rosalino Crum MD. I have documented the information dictated during the patient encounter for the following sections: RFV, HPI, ROS, PE, ASSESSMENT/PLAN.  I personally performed the services described in the documentation, reviewed the documentation recorded by the scribe in my presence, and it accurately and completely records my words and actions.

## 2024-06-04 ENCOUNTER — APPOINTMENT (OUTPATIENT)
Dept: INTERNAL MEDICINE | Facility: CLINIC | Age: 64
End: 2024-06-04
Payer: MEDICARE

## 2024-06-04 VITALS
WEIGHT: 167 LBS | BODY MASS INDEX: 26.84 KG/M2 | SYSTOLIC BLOOD PRESSURE: 120 MMHG | RESPIRATION RATE: 12 BRPM | HEIGHT: 66 IN | DIASTOLIC BLOOD PRESSURE: 82 MMHG | HEART RATE: 78 BPM

## 2024-06-04 DIAGNOSIS — N40.1 BENIGN PROSTATIC HYPERPLASIA WITH LOWER URINARY TRACT SYMPMS: ICD-10-CM

## 2024-06-04 DIAGNOSIS — N13.8 BENIGN PROSTATIC HYPERPLASIA WITH LOWER URINARY TRACT SYMPMS: ICD-10-CM

## 2024-06-04 DIAGNOSIS — Z01.818 ENCOUNTER FOR OTHER PREPROCEDURAL EXAMINATION: ICD-10-CM

## 2024-06-04 DIAGNOSIS — K31.89 OTHER DISEASES OF STOMACH AND DUODENUM: ICD-10-CM

## 2024-06-04 DIAGNOSIS — J45.40 MODERATE PERSISTENT ASTHMA, UNCOMPLICATED: ICD-10-CM

## 2024-06-04 PROCEDURE — 99214 OFFICE O/P EST MOD 30 MIN: CPT

## 2024-06-04 PROCEDURE — G2211 COMPLEX E/M VISIT ADD ON: CPT

## 2024-06-05 ENCOUNTER — OUTPATIENT (OUTPATIENT)
Dept: OUTPATIENT SERVICES | Facility: HOSPITAL | Age: 64
LOS: 1 days | End: 2024-06-05
Payer: MEDICARE

## 2024-06-05 VITALS
WEIGHT: 160.94 LBS | SYSTOLIC BLOOD PRESSURE: 140 MMHG | OXYGEN SATURATION: 97 % | RESPIRATION RATE: 14 BRPM | DIASTOLIC BLOOD PRESSURE: 88 MMHG | HEIGHT: 62 IN | HEART RATE: 88 BPM | TEMPERATURE: 97 F

## 2024-06-05 DIAGNOSIS — Z98.890 OTHER SPECIFIED POSTPROCEDURAL STATES: Chronic | ICD-10-CM

## 2024-06-05 DIAGNOSIS — K31.89 OTHER DISEASES OF STOMACH AND DUODENUM: ICD-10-CM

## 2024-06-05 DIAGNOSIS — Z29.9 ENCOUNTER FOR PROPHYLACTIC MEASURES, UNSPECIFIED: ICD-10-CM

## 2024-06-05 DIAGNOSIS — Z01.818 ENCOUNTER FOR OTHER PREPROCEDURAL EXAMINATION: ICD-10-CM

## 2024-06-05 LAB
A1C WITH ESTIMATED AVERAGE GLUCOSE RESULT: 6.4 % — HIGH (ref 4–5.6)
ALBUMIN SERPL ELPH-MCNC: 4.4 G/DL — SIGNIFICANT CHANGE UP (ref 3.3–5.2)
ALP SERPL-CCNC: 101 U/L — SIGNIFICANT CHANGE UP (ref 40–120)
ALT FLD-CCNC: 45 U/L — HIGH
ANION GAP SERPL CALC-SCNC: 12 MMOL/L — SIGNIFICANT CHANGE UP (ref 5–17)
APTT BLD: 33.3 SEC — SIGNIFICANT CHANGE UP (ref 24.5–35.6)
AST SERPL-CCNC: 27 U/L — SIGNIFICANT CHANGE UP
BASOPHILS # BLD AUTO: 0.04 K/UL — SIGNIFICANT CHANGE UP (ref 0–0.2)
BASOPHILS NFR BLD AUTO: 0.9 % — SIGNIFICANT CHANGE UP (ref 0–2)
BILIRUB SERPL-MCNC: 0.3 MG/DL — LOW (ref 0.4–2)
BLD GP AB SCN SERPL QL: SIGNIFICANT CHANGE UP
BUN SERPL-MCNC: 12.1 MG/DL — SIGNIFICANT CHANGE UP (ref 8–20)
CALCIUM SERPL-MCNC: 9.7 MG/DL — SIGNIFICANT CHANGE UP (ref 8.4–10.5)
CHLORIDE SERPL-SCNC: 101 MMOL/L — SIGNIFICANT CHANGE UP (ref 96–108)
CO2 SERPL-SCNC: 25 MMOL/L — SIGNIFICANT CHANGE UP (ref 22–29)
CREAT SERPL-MCNC: 0.73 MG/DL — SIGNIFICANT CHANGE UP (ref 0.5–1.3)
EGFR: 101 ML/MIN/1.73M2 — SIGNIFICANT CHANGE UP
EOSINOPHIL # BLD AUTO: 0.18 K/UL — SIGNIFICANT CHANGE UP (ref 0–0.5)
EOSINOPHIL NFR BLD AUTO: 3.9 % — SIGNIFICANT CHANGE UP (ref 0–6)
ESTIMATED AVERAGE GLUCOSE: 137 MG/DL — HIGH (ref 68–114)
GLUCOSE SERPL-MCNC: 106 MG/DL — HIGH (ref 70–99)
HCT VFR BLD CALC: 42.3 % — SIGNIFICANT CHANGE UP (ref 39–50)
HGB BLD-MCNC: 13.8 G/DL — SIGNIFICANT CHANGE UP (ref 13–17)
IMM GRANULOCYTES NFR BLD AUTO: 0.2 % — SIGNIFICANT CHANGE UP (ref 0–0.9)
INR BLD: 0.98 RATIO — SIGNIFICANT CHANGE UP (ref 0.85–1.18)
LYMPHOCYTES # BLD AUTO: 2.32 K/UL — SIGNIFICANT CHANGE UP (ref 1–3.3)
LYMPHOCYTES # BLD AUTO: 50.8 % — HIGH (ref 13–44)
MCHC RBC-ENTMCNC: 29.8 PG — SIGNIFICANT CHANGE UP (ref 27–34)
MCHC RBC-ENTMCNC: 32.6 GM/DL — SIGNIFICANT CHANGE UP (ref 32–36)
MCV RBC AUTO: 91.4 FL — SIGNIFICANT CHANGE UP (ref 80–100)
MONOCYTES # BLD AUTO: 0.47 K/UL — SIGNIFICANT CHANGE UP (ref 0–0.9)
MONOCYTES NFR BLD AUTO: 10.3 % — SIGNIFICANT CHANGE UP (ref 2–14)
NEUTROPHILS # BLD AUTO: 1.55 K/UL — LOW (ref 1.8–7.4)
NEUTROPHILS NFR BLD AUTO: 33.9 % — LOW (ref 43–77)
PLATELET # BLD AUTO: 267 K/UL — SIGNIFICANT CHANGE UP (ref 150–400)
POTASSIUM SERPL-MCNC: 4.6 MMOL/L — SIGNIFICANT CHANGE UP (ref 3.5–5.3)
POTASSIUM SERPL-SCNC: 4.6 MMOL/L — SIGNIFICANT CHANGE UP (ref 3.5–5.3)
PROT SERPL-MCNC: 7.8 G/DL — SIGNIFICANT CHANGE UP (ref 6.6–8.7)
PROTHROM AB SERPL-ACNC: 10.9 SEC — SIGNIFICANT CHANGE UP (ref 9.5–13)
RBC # BLD: 4.63 M/UL — SIGNIFICANT CHANGE UP (ref 4.2–5.8)
RBC # FLD: 14.2 % — SIGNIFICANT CHANGE UP (ref 10.3–14.5)
SODIUM SERPL-SCNC: 138 MMOL/L — SIGNIFICANT CHANGE UP (ref 135–145)
WBC # BLD: 4.57 K/UL — SIGNIFICANT CHANGE UP (ref 3.8–10.5)
WBC # FLD AUTO: 4.57 K/UL — SIGNIFICANT CHANGE UP (ref 3.8–10.5)

## 2024-06-05 PROCEDURE — YYYYY: CPT

## 2024-06-05 PROCEDURE — 93010 ELECTROCARDIOGRAM REPORT: CPT

## 2024-06-05 NOTE — H&P PST ADULT - ASSESSMENT
63 yo M PMH of asthma, HLD, BPH, presents with c/o duodenal mass. CT Abdomen/Pelvis done 4/15/2024 Impression: Well-circumscribed homogeneously enhancing lesion arising from the wall of the distal third portion of the duodenum, strongly suspicious for carcinoid, in retrospect stable from prior CT in 2023. No evidence of regional adenopathy or metastatic disease. Preop assessment prior to open partial duodenectomy, possible small bowel resection w/Dr Correa scheduled for 2024, pending medical clearance    Patient and wife were educated on preop preparation with written and verbal instructions. Pt was informed to obtain clearances >3 days before surgery. Pt was educated on NSAIDs, multivitamins and herbals that increase the risk of bleeding and need to be stopped 7 days before procedure. Pt and wife verbalized understanding of the above.     OPIOID RISK TOOL    GIAN EACH BOX THAT APPLIES AND ADD TOTALS AT THE END    FAMILY HISTORY OF SUBSTANCE ABUSE                 FEMALE         MALE                                                Alcohol                             [  ]1 pt          [  ]3pts                                               Illegal Drugs                     [  ]2 pts        [  ]3pts                                               Rx Drugs                           [  ]4 pts        [  ]4 pts    PERSONAL HISTORY OF SUBSTANCE ABUSE                                                                                          Alcohol                             [  ]3 pts       [  ]3 pts                                               Illegal Drugs                     [  ]4 pts        [  ]4 pts                                               Rx Drugs                           [  ]5 pts        [  ]5 pts    AGE BETWEEN 16-45 YEARS                                      [  ]1 pt         [  ]1 pt    HISTORY OF PREADOLESCENT   SEXUAL ABUSE                                                             [  ]3 pts        [  ]0pts    PSYCHOLOGICAL DISEASE                     ADD, OCD, Bipolar, Schizophrenia        [  ]2 pts         [  ]2 pts                      Depression                                               [  ]1 pt           [  ]1 pt           SCORING TOTAL   (add numbers and type here)              ( 0 )                                     A score of 3 or lower indicated LOW risk for future opioid abuse  A score of 4 to 7 indicated moderate risk for future opioid abuse  A score of 8 or higher indicates a high risk for opioid abuse    CAPRINI VTE 2.0 SCORE [CLOT updated 2019]    AGE RELATED RISK FACTORS                                                       MOBILITY RELATED FACTORS  [ ] Age 41-60 years                                            (1 Point)                    [ ] Bed rest                                                        (1 Point)  [ x] Age: 61-74 years                                           (2 Points)                  [ ] Plaster cast                                                   (2 Points)  [ ] Age= 75 years                                              (3 Points)                    [ ] Bed bound for more than 72 hours                 (2 Points)    DISEASE RELATED RISK FACTORS                                               GENDER SPECIFIC FACTORS  [ ] Edema in the lower extremities                       (1 Point)              [ ] Pregnancy                                                     (1 Point)  [ ] Varicose veins                                               (1 Point)                     [ ] Post-partum < 6 weeks                                   (1 Point)             [x ] BMI > 25 Kg/m2                                            (1 Point)                     [ ] Hormonal therapy  or oral contraception          (1 Point)                 [ ] Sepsis (in the previous month)                        (1 Point)               [ ] History of pregnancy complications                 (1 point)  [ ] Pneumonia or serious lung disease                                               [ ] Unexplained or recurrent                     (1 Point)           (in the previous month)                               (1 Point)  [ ] Abnormal pulmonary function test                     (1 Point)                 SURGERY RELATED RISK FACTORS  [ ] Acute myocardial infarction                              (1 Point)               [ ]  Section                                             (1 Point)  [ ] Congestive heart failure (in the previous month)  (1 Point)      [ ] Minor surgery                                                  (1 Point)   [ ] Inflammatory bowel disease                             (1 Point)               [ ] Arthroscopic surgery                                        (2 Points)  [ ] Central venous access                                      (2 Points)                [ x] General surgery lasting more than 45 minutes (2 points)  [x ] Malignancy- Present or previous                   (2 Points)                [ ] Elective arthroplasty                                         (5 points)    [ ] Stroke (in the previous month)                          (5 Points)                                                                                                                                                           HEMATOLOGY RELATED FACTORS                                                 TRAUMA RELATED RISK FACTORS  [ ] Prior episodes of VTE                                     (3 Points)                [ ] Fracture of the hip, pelvis, or leg                       (5 Points)  [ ] Positive family history for VTE                         (3 Points)             [ ] Acute spinal cord injury (in the previous month)  (5 Points)  [ ] Prothrombin 21841 A                                     (3 Points)               [ ] Paralysis  (less than 1 month)                             (5 Points)  [ ] Factor V Leiden                                             (3 Points)                  [ ] Multiple Trauma within 1 month                        (5 Points)  [ ] Lupus anticoagulants                                     (3 Points)                                                           [ ] Anticardiolipin antibodies                               (3 Points)                                                       [ ] High homocysteine in the blood                      (3 Points)                                             [ ] Other congenital or acquired thrombophilia      (3 Points)                                                [ ] Heparin induced thrombocytopenia                  (3 Points)                                     Total Score [    7      ] 63 yo M PMH of asthma, duodenal mass, initially presented with c/o diffuse abdominal pain. CT Abdomen/Pelvis done 4/15/2024 showed well-circumscribed homogeneously enhancing lesion arising from the wall of the distal third portion of the duodenum, strongly suspicious for carcinoid, in retrospect stable from prior CT in 2023. No evidence of regional adenopathy or metastatic disease. Preop assessment prior to open partial duodenectomy, possible small bowel resection w/Dr Correa scheduled for 2024, pending medical clearance    Patient and wife were educated on preop preparation with written and verbal instructions. Pt was informed to obtain medical clearance >3 days before surgery. Pt was educated on NSAIDs, multivitamins and herbals that increase the risk of bleeding and need to be stopped 7 days before procedure. Pt and wife verbalized understanding of the above.     OPIOID RISK TOOL    GIAN EACH BOX THAT APPLIES AND ADD TOTALS AT THE END    FAMILY HISTORY OF SUBSTANCE ABUSE                 FEMALE         MALE                                                Alcohol                             [  ]1 pt          [  ]3pts                                               Illegal Drugs                     [  ]2 pts        [  ]3pts                                               Rx Drugs                           [  ]4 pts        [  ]4 pts    PERSONAL HISTORY OF SUBSTANCE ABUSE                                                                                          Alcohol                             [  ]3 pts       [  ]3 pts                                               Illegal Drugs                     [  ]4 pts        [  ]4 pts                                               Rx Drugs                           [  ]5 pts        [  ]5 pts    AGE BETWEEN 16-45 YEARS                                      [  ]1 pt         [  ]1 pt    HISTORY OF PREADOLESCENT   SEXUAL ABUSE                                                             [  ]3 pts        [  ]0pts    PSYCHOLOGICAL DISEASE                     ADD, OCD, Bipolar, Schizophrenia        [  ]2 pts         [  ]2 pts                      Depression                                               [  ]1 pt           [  ]1 pt           SCORING TOTAL   (add numbers and type here)              ( 0 )                                     A score of 3 or lower indicated LOW risk for future opioid abuse  A score of 4 to 7 indicated moderate risk for future opioid abuse  A score of 8 or higher indicates a high risk for opioid abuse    CAPRINI VTE 2.0 SCORE [CLOT updated 2019]    AGE RELATED RISK FACTORS                                                       MOBILITY RELATED FACTORS  [ ] Age 41-60 years                                            (1 Point)                    [ ] Bed rest                                                        (1 Point)  [ x] Age: 61-74 years                                           (2 Points)                  [ ] Plaster cast                                                   (2 Points)  [ ] Age= 75 years                                              (3 Points)                    [ ] Bed bound for more than 72 hours                 (2 Points)    DISEASE RELATED RISK FACTORS                                               GENDER SPECIFIC FACTORS  [ ] Edema in the lower extremities                       (1 Point)              [ ] Pregnancy                                                     (1 Point)  [ ] Varicose veins                                               (1 Point)                     [ ] Post-partum < 6 weeks                                   (1 Point)             [x ] BMI > 25 Kg/m2                                            (1 Point)                     [ ] Hormonal therapy  or oral contraception          (1 Point)                 [ ] Sepsis (in the previous month)                        (1 Point)               [ ] History of pregnancy complications                 (1 point)  [ ] Pneumonia or serious lung disease                                               [ ] Unexplained or recurrent                     (1 Point)           (in the previous month)                               (1 Point)  [ ] Abnormal pulmonary function test                     (1 Point)                 SURGERY RELATED RISK FACTORS  [ ] Acute myocardial infarction                              (1 Point)               [ ]  Section                                             (1 Point)  [ ] Congestive heart failure (in the previous month)  (1 Point)      [ ] Minor surgery                                                  (1 Point)   [ ] Inflammatory bowel disease                             (1 Point)               [ ] Arthroscopic surgery                                        (2 Points)  [ ] Central venous access                                      (2 Points)                [ x] General surgery lasting more than 45 minutes (2 points)  [x ] Malignancy- Present or previous                   (2 Points)                [ ] Elective arthroplasty                                         (5 points)    [ ] Stroke (in the previous month)                          (5 Points)                                                                                                                                                           HEMATOLOGY RELATED FACTORS                                                 TRAUMA RELATED RISK FACTORS  [ ] Prior episodes of VTE                                     (3 Points)                [ ] Fracture of the hip, pelvis, or leg                       (5 Points)  [ ] Positive family history for VTE                         (3 Points)             [ ] Acute spinal cord injury (in the previous month)  (5 Points)  [ ] Prothrombin 14537 A                                     (3 Points)               [ ] Paralysis  (less than 1 month)                             (5 Points)  [ ] Factor V Leiden                                             (3 Points)                  [ ] Multiple Trauma within 1 month                        (5 Points)  [ ] Lupus anticoagulants                                     (3 Points)                                                           [ ] Anticardiolipin antibodies                               (3 Points)                                                       [ ] High homocysteine in the blood                      (3 Points)                                             [ ] Other congenital or acquired thrombophilia      (3 Points)                                                [ ] Heparin induced thrombocytopenia                  (3 Points)                                     Total Score [    7      ]

## 2024-06-05 NOTE — H&P PST ADULT - ATTENDING COMMENTS
Planning open partial duodenectomy, possible duodenojejunostomy, possible bowel resection. R/B/A explained, including but not limited to pain, infection, bleeding requiring intervention, damage to surrounding structures including bowels, stomach, colon, vessels, risk of anastomotic leak as well. Risk of cardiac event, stroke, PE and death explained. I also explained in detail in the office and here today, that this lesion is close to the pancreas and I most likely will not need to address the pancreas to remove this. If however, a pancreatectomy is needed due to the location in the duodenum, we discussed possibly proceeding with a pancreatectomy (whipple) in order to remove this tumor. I would call the patient's family from the operating room if it is found a Whipple is needed and whether we'd like to proceed now or later, however again I feel this is unlikely today. The patient and his daughter understand and agree to this plan. All questions answered today, including those related to post op recovery, hospital length of stay, and post op limitations.

## 2024-06-05 NOTE — H&P PST ADULT - NSANTHOSAYNRD_GEN_A_CORE
No. JOAO screening performed.  STOP BANG Legend: 0-2 = LOW Risk; 3-4 = INTERMEDIATE Risk; 5-8 = HIGH Risk

## 2024-06-05 NOTE — H&P PST ADULT - NEGATIVE GASTROINTESTINAL SYMPTOMS
no nausea/no vomiting/no diarrhea/no constipation/no change in bowel habits/no abdominal pain/no melena/no hematochezia no nausea/no vomiting/no diarrhea/no change in bowel habits/no melena/no hematochezia

## 2024-06-05 NOTE — H&P PST ADULT - GASTROINTESTINAL
details… soft/nontender/nondistended/normal active bowel sounds soft/nondistended/normal active bowel sounds/tender

## 2024-06-05 NOTE — H&P PST ADULT - PROBLEM SELECTOR PLAN 1
preop assessment, open partial duodenectomy, possible small bowel resection w/Dr Correa scheduled for 6/7/2024, pending medical clearance

## 2024-06-05 NOTE — H&P PST ADULT - NSICDXFAMILYHX_GEN_ALL_CORE_FT
FAMILY HISTORY:  Father  Still living? Unknown  Family history of hypertension in father, Age at diagnosis: Age Unknown     FAMILY HISTORY:  Father  Still living? Unknown  Family history of hypertension in father, Age at diagnosis: Age Unknown    Sibling  Still living? Unknown  FH: stomach cancer, Age at diagnosis: Age Unknown  FH: throat cancer, Age at diagnosis: Age Unknown

## 2024-06-05 NOTE — H&P PST ADULT - NEGATIVE GENERAL GENITOURINARY SYMPTOMS
no hematuria/no flank pain L/no flank pain R/no incontinence/no dysuria/normal urinary frequency/no nocturia no hematuria/no flank pain L/no flank pain R/no bladder infections/no incontinence/no dysuria/normal urinary frequency/no nocturia

## 2024-06-06 ENCOUNTER — TRANSCRIPTION ENCOUNTER (OUTPATIENT)
Age: 64
End: 2024-06-06

## 2024-06-06 NOTE — HISTORY OF PRESENT ILLNESS
[No Pertinent Cardiac History] : no history of aortic stenosis, atrial fibrillation, coronary artery disease, recent myocardial infarction, or implantable device/pacemaker [Asthma] : asthma [No Adverse Anesthesia Reaction] : no adverse anesthesia reaction in self or family member [(Patient denies any chest pain, claudication, dyspnea on exertion, orthopnea, palpitations or syncope)] : Patient denies any chest pain, claudication, dyspnea on exertion, orthopnea, palpitations or syncope [Aortic Stenosis] : no aortic stenosis [Atrial Fibrillation] : no atrial fibrillation [Coronary Artery Disease] : no coronary artery disease [Recent Myocardial Infarction] : no recent myocardial infarction [Implantable Device/Pacemaker] : no implantable device/pacemaker [COPD] : no COPD [Sleep Apnea] : no sleep apnea [Smoker] : not a smoker [Family Member] : no family member with adverse anesthesia reaction/sudden death [Self] : no previous adverse anesthesia reaction [Chronic Anticoagulation] : no chronic anticoagulation [Chronic Kidney Disease] : no chronic kidney disease [Diabetes] : no diabetes [FreeTextEntry1] : Duodenal Mass [FreeTextEntry2] : 6/6/24 [FreeTextEntry3] : Dr. Crum [FreeTextEntry4] : Patient with history of asthma who will undergo excision of mass of the 3rd part of the duodenum.  He has been in stable health.

## 2024-06-06 NOTE — ASSESSMENT
[Patient Optimized for Surgery] : Patient optimized for surgery [No Further Testing Recommended] : no further testing recommended [FreeTextEntry4] : Patient is medically stable for planned procedure.  Has no contraindications.  Perioperative risk is low with Mace score less than 1 percent  [FreeTextEntry7] : hold nsaids prior to surgery

## 2024-06-06 NOTE — RESULTS/DATA
[] : results reviewed [de-identified] : normal [de-identified] : normal [de-identified] : nsr no acute changes [de-identified] : normal

## 2024-06-07 ENCOUNTER — INPATIENT (INPATIENT)
Facility: HOSPITAL | Age: 64
LOS: 4 days | Discharge: HOME | DRG: 392 | End: 2024-06-12
Attending: STUDENT IN AN ORGANIZED HEALTH CARE EDUCATION/TRAINING PROGRAM | Admitting: SURGERY
Payer: MEDICARE

## 2024-06-07 ENCOUNTER — APPOINTMENT (OUTPATIENT)
Dept: SURGICAL ONCOLOGY | Facility: HOSPITAL | Age: 64
End: 2024-06-07

## 2024-06-07 ENCOUNTER — RESULT REVIEW (OUTPATIENT)
Age: 64
End: 2024-06-07

## 2024-06-07 VITALS
DIASTOLIC BLOOD PRESSURE: 79 MMHG | TEMPERATURE: 98 F | SYSTOLIC BLOOD PRESSURE: 152 MMHG | HEART RATE: 80 BPM | RESPIRATION RATE: 16 BRPM | WEIGHT: 160.94 LBS | OXYGEN SATURATION: 99 % | HEIGHT: 62.01 IN

## 2024-06-07 DIAGNOSIS — K31.89 OTHER DISEASES OF STOMACH AND DUODENUM: ICD-10-CM

## 2024-06-07 DIAGNOSIS — Z98.890 OTHER SPECIFIED POSTPROCEDURAL STATES: Chronic | ICD-10-CM

## 2024-06-07 LAB
ANION GAP SERPL CALC-SCNC: 10 MMOL/L — SIGNIFICANT CHANGE UP (ref 5–17)
BUN SERPL-MCNC: 11.1 MG/DL — SIGNIFICANT CHANGE UP (ref 8–20)
CALCIUM SERPL-MCNC: 7.7 MG/DL — LOW (ref 8.4–10.5)
CHLORIDE SERPL-SCNC: 108 MMOL/L — SIGNIFICANT CHANGE UP (ref 96–108)
CO2 SERPL-SCNC: 20 MMOL/L — LOW (ref 22–29)
CREAT SERPL-MCNC: 0.8 MG/DL — SIGNIFICANT CHANGE UP (ref 0.5–1.3)
EGFR: 98 ML/MIN/1.73M2 — SIGNIFICANT CHANGE UP
GLUCOSE SERPL-MCNC: 121 MG/DL — HIGH (ref 70–99)
HCT VFR BLD CALC: 38 % — LOW (ref 39–50)
HGB BLD-MCNC: 12.4 G/DL — LOW (ref 13–17)
MAGNESIUM SERPL-MCNC: 1.7 MG/DL — SIGNIFICANT CHANGE UP (ref 1.6–2.6)
MCHC RBC-ENTMCNC: 29.9 PG — SIGNIFICANT CHANGE UP (ref 27–34)
MCHC RBC-ENTMCNC: 32.6 GM/DL — SIGNIFICANT CHANGE UP (ref 32–36)
MCV RBC AUTO: 91.6 FL — SIGNIFICANT CHANGE UP (ref 80–100)
PHOSPHATE SERPL-MCNC: 2.6 MG/DL — SIGNIFICANT CHANGE UP (ref 2.4–4.7)
PLATELET # BLD AUTO: 217 K/UL — SIGNIFICANT CHANGE UP (ref 150–400)
POTASSIUM SERPL-MCNC: 5.1 MMOL/L — SIGNIFICANT CHANGE UP (ref 3.5–5.3)
POTASSIUM SERPL-SCNC: 5.1 MMOL/L — SIGNIFICANT CHANGE UP (ref 3.5–5.3)
RBC # BLD: 4.15 M/UL — LOW (ref 4.2–5.8)
RBC # FLD: 14.3 % — SIGNIFICANT CHANGE UP (ref 10.3–14.5)
SODIUM SERPL-SCNC: 138 MMOL/L — SIGNIFICANT CHANGE UP (ref 135–145)
WBC # BLD: 8.65 K/UL — SIGNIFICANT CHANGE UP (ref 3.8–10.5)
WBC # FLD AUTO: 8.65 K/UL — SIGNIFICANT CHANGE UP (ref 3.8–10.5)

## 2024-06-07 PROCEDURE — 88309 TISSUE EXAM BY PATHOLOGIST: CPT | Mod: 26

## 2024-06-07 PROCEDURE — 88302 TISSUE EXAM BY PATHOLOGIST: CPT | Mod: 26

## 2024-06-07 PROCEDURE — 86900 BLOOD TYPING SEROLOGIC ABO: CPT

## 2024-06-07 PROCEDURE — G0463: CPT

## 2024-06-07 PROCEDURE — 49905 OMENTAL FLAP INTRA-ABDOM: CPT

## 2024-06-07 PROCEDURE — 36415 COLL VENOUS BLD VENIPUNCTURE: CPT

## 2024-06-07 PROCEDURE — XXXXX: CPT | Mod: 1L

## 2024-06-07 PROCEDURE — 85730 THROMBOPLASTIN TIME PARTIAL: CPT

## 2024-06-07 PROCEDURE — 85025 COMPLETE CBC W/AUTO DIFF WBC: CPT

## 2024-06-07 PROCEDURE — 80053 COMPREHEN METABOLIC PANEL: CPT

## 2024-06-07 PROCEDURE — 93005 ELECTROCARDIOGRAM TRACING: CPT

## 2024-06-07 PROCEDURE — 44120 REMOVAL OF SMALL INTESTINE: CPT

## 2024-06-07 PROCEDURE — 88341 IMHCHEM/IMCYTCHM EA ADD ANTB: CPT | Mod: 26

## 2024-06-07 PROCEDURE — 88342 IMHCHEM/IMCYTCHM 1ST ANTB: CPT | Mod: 26

## 2024-06-07 PROCEDURE — 86923 COMPATIBILITY TEST ELECTRIC: CPT

## 2024-06-07 PROCEDURE — 86850 RBC ANTIBODY SCREEN: CPT

## 2024-06-07 PROCEDURE — 85610 PROTHROMBIN TIME: CPT

## 2024-06-07 PROCEDURE — 86901 BLOOD TYPING SEROLOGIC RH(D): CPT

## 2024-06-07 PROCEDURE — 83036 HEMOGLOBIN GLYCOSYLATED A1C: CPT

## 2024-06-07 DEVICE — SURGIFLO MATRIX WITH THROMBIN KIT: Type: IMPLANTABLE DEVICE | Status: FUNCTIONAL

## 2024-06-07 DEVICE — LIGATING CLIPS WECK HORIZON LARGE (ORANGE) 6: Type: IMPLANTABLE DEVICE | Status: FUNCTIONAL

## 2024-06-07 DEVICE — LIGATING CLIPS WECK HORIZON MEDIUM (BLUE) 24: Type: IMPLANTABLE DEVICE | Status: FUNCTIONAL

## 2024-06-07 DEVICE — STAPLER COVIDIEN TRI-STAPLE 60MM PURPLE RELOAD: Type: IMPLANTABLE DEVICE | Status: FUNCTIONAL

## 2024-06-07 RX ORDER — BUPIVACAINE 13.3 MG/ML
20 INJECTION, SUSPENSION, LIPOSOMAL INFILTRATION ONCE
Refills: 0 | Status: DISCONTINUED | OUTPATIENT
Start: 2024-06-07 | End: 2024-06-07

## 2024-06-07 RX ORDER — CEFAZOLIN SODIUM 1 G
2000 VIAL (EA) INJECTION ONCE
Refills: 0 | Status: DISCONTINUED | OUTPATIENT
Start: 2024-06-07 | End: 2024-06-07

## 2024-06-07 RX ORDER — HEPARIN SODIUM 5000 [USP'U]/ML
5000 INJECTION INTRAVENOUS; SUBCUTANEOUS ONCE
Refills: 0 | Status: COMPLETED | OUTPATIENT
Start: 2024-06-07 | End: 2024-06-07

## 2024-06-07 RX ORDER — ONDANSETRON 8 MG/1
4 TABLET, FILM COATED ORAL EVERY 6 HOURS
Refills: 0 | Status: DISCONTINUED | OUTPATIENT
Start: 2024-06-07 | End: 2024-06-12

## 2024-06-07 RX ORDER — BUDESONIDE AND FORMOTEROL FUMARATE DIHYDRATE 160; 4.5 UG/1; UG/1
2 AEROSOL RESPIRATORY (INHALATION)
Refills: 0 | Status: DISCONTINUED | OUTPATIENT
Start: 2024-06-07 | End: 2024-06-12

## 2024-06-07 RX ORDER — HYDROMORPHONE HYDROCHLORIDE 2 MG/ML
30 INJECTION INTRAMUSCULAR; INTRAVENOUS; SUBCUTANEOUS
Refills: 0 | Status: DISCONTINUED | OUTPATIENT
Start: 2024-06-07 | End: 2024-06-10

## 2024-06-07 RX ORDER — ACETAMINOPHEN 500 MG
975 TABLET ORAL ONCE
Refills: 0 | Status: COMPLETED | OUTPATIENT
Start: 2024-06-07 | End: 2024-06-07

## 2024-06-07 RX ORDER — SODIUM CHLORIDE 9 MG/ML
1000 INJECTION, SOLUTION INTRAVENOUS
Refills: 0 | Status: DISCONTINUED | OUTPATIENT
Start: 2024-06-07 | End: 2024-06-12

## 2024-06-07 RX ORDER — ENOXAPARIN SODIUM 100 MG/ML
40 INJECTION SUBCUTANEOUS EVERY 24 HOURS
Refills: 0 | Status: DISCONTINUED | OUTPATIENT
Start: 2024-06-07 | End: 2024-06-12

## 2024-06-07 RX ORDER — MAGNESIUM SULFATE 500 MG/ML
2 VIAL (ML) INJECTION ONCE
Refills: 0 | Status: COMPLETED | OUTPATIENT
Start: 2024-06-07 | End: 2024-06-07

## 2024-06-07 RX ORDER — ONDANSETRON 8 MG/1
4 TABLET, FILM COATED ORAL ONCE
Refills: 0 | Status: COMPLETED | OUTPATIENT
Start: 2024-06-07 | End: 2024-06-07

## 2024-06-07 RX ORDER — MAGNESIUM SULFATE 500 MG/ML
1 VIAL (ML) INJECTION ONCE
Refills: 0 | Status: DISCONTINUED | OUTPATIENT
Start: 2024-06-07 | End: 2024-06-07

## 2024-06-07 RX ORDER — ALBUTEROL 90 UG/1
2 AEROSOL, METERED ORAL EVERY 6 HOURS
Refills: 0 | Status: DISCONTINUED | OUTPATIENT
Start: 2024-06-07 | End: 2024-06-12

## 2024-06-07 RX ORDER — ALBUTEROL 90 UG/1
2 AEROSOL, METERED ORAL
Refills: 0 | DISCHARGE

## 2024-06-07 RX ORDER — SODIUM CHLORIDE 9 MG/ML
3 INJECTION INTRAMUSCULAR; INTRAVENOUS; SUBCUTANEOUS EVERY 8 HOURS
Refills: 0 | Status: DISCONTINUED | OUTPATIENT
Start: 2024-06-07 | End: 2024-06-07

## 2024-06-07 RX ORDER — ACETAMINOPHEN 500 MG
1000 TABLET ORAL EVERY 6 HOURS
Refills: 0 | Status: COMPLETED | OUTPATIENT
Start: 2024-06-07 | End: 2024-06-08

## 2024-06-07 RX ORDER — HYDROMORPHONE HYDROCHLORIDE 2 MG/ML
0.5 INJECTION INTRAMUSCULAR; INTRAVENOUS; SUBCUTANEOUS
Refills: 0 | Status: DISCONTINUED | OUTPATIENT
Start: 2024-06-07 | End: 2024-06-07

## 2024-06-07 RX ORDER — NALOXONE HYDROCHLORIDE 4 MG/.1ML
0.1 SPRAY NASAL
Refills: 0 | Status: DISCONTINUED | OUTPATIENT
Start: 2024-06-07 | End: 2024-06-12

## 2024-06-07 RX ORDER — SODIUM CHLORIDE 9 MG/ML
1000 INJECTION, SOLUTION INTRAVENOUS
Refills: 0 | Status: DISCONTINUED | OUTPATIENT
Start: 2024-06-07 | End: 2024-06-07

## 2024-06-07 RX ADMIN — Medication 400 MILLIGRAM(S): at 12:35

## 2024-06-07 RX ADMIN — HYDROMORPHONE HYDROCHLORIDE 0.5 MILLIGRAM(S): 2 INJECTION INTRAMUSCULAR; INTRAVENOUS; SUBCUTANEOUS at 11:45

## 2024-06-07 RX ADMIN — Medication 25 GRAM(S): at 13:41

## 2024-06-07 RX ADMIN — HYDROMORPHONE HYDROCHLORIDE 30 MILLILITER(S): 2 INJECTION INTRAMUSCULAR; INTRAVENOUS; SUBCUTANEOUS at 19:31

## 2024-06-07 RX ADMIN — ENOXAPARIN SODIUM 40 MILLIGRAM(S): 100 INJECTION SUBCUTANEOUS at 18:18

## 2024-06-07 RX ADMIN — HYDROMORPHONE HYDROCHLORIDE 30 MILLILITER(S): 2 INJECTION INTRAMUSCULAR; INTRAVENOUS; SUBCUTANEOUS at 11:25

## 2024-06-07 RX ADMIN — HYDROMORPHONE HYDROCHLORIDE 0.5 MILLIGRAM(S): 2 INJECTION INTRAMUSCULAR; INTRAVENOUS; SUBCUTANEOUS at 11:25

## 2024-06-07 RX ADMIN — HYDROMORPHONE HYDROCHLORIDE 30 MILLILITER(S): 2 INJECTION INTRAMUSCULAR; INTRAVENOUS; SUBCUTANEOUS at 15:25

## 2024-06-07 RX ADMIN — ONDANSETRON 4 MILLIGRAM(S): 8 TABLET, FILM COATED ORAL at 15:45

## 2024-06-07 RX ADMIN — BUDESONIDE AND FORMOTEROL FUMARATE DIHYDRATE 2 PUFF(S): 160; 4.5 AEROSOL RESPIRATORY (INHALATION) at 22:15

## 2024-06-07 RX ADMIN — Medication 1000 MILLIGRAM(S): at 12:35

## 2024-06-07 RX ADMIN — HEPARIN SODIUM 5000 UNIT(S): 5000 INJECTION INTRAVENOUS; SUBCUTANEOUS at 06:45

## 2024-06-07 RX ADMIN — Medication 400 MILLIGRAM(S): at 18:18

## 2024-06-07 RX ADMIN — Medication 975 MILLIGRAM(S): at 06:44

## 2024-06-07 RX ADMIN — ONDANSETRON 4 MILLIGRAM(S): 8 TABLET, FILM COATED ORAL at 12:26

## 2024-06-07 RX ADMIN — Medication 85 MILLIMOLE(S): at 19:12

## 2024-06-07 RX ADMIN — Medication 1000 MILLIGRAM(S): at 19:18

## 2024-06-07 RX ADMIN — SODIUM CHLORIDE 100 MILLILITER(S): 9 INJECTION, SOLUTION INTRAVENOUS at 22:12

## 2024-06-07 RX ADMIN — HYDROMORPHONE HYDROCHLORIDE 30 MILLILITER(S): 2 INJECTION INTRAMUSCULAR; INTRAVENOUS; SUBCUTANEOUS at 14:23

## 2024-06-07 NOTE — ASU PREOP CHECKLIST - IV STARTED
Andrade Vent settings: Rate 16, Tidal Volume 450 PEEP 5.  FIO2 100%     Oni Sanchez RN  11/10/23 8689 yes

## 2024-06-08 LAB
ALBUMIN SERPL ELPH-MCNC: 3.4 G/DL — SIGNIFICANT CHANGE UP (ref 3.3–5.2)
ALP SERPL-CCNC: 79 U/L — SIGNIFICANT CHANGE UP (ref 40–120)
ALT FLD-CCNC: 78 U/L — HIGH
ANION GAP SERPL CALC-SCNC: 12 MMOL/L — SIGNIFICANT CHANGE UP (ref 5–17)
AST SERPL-CCNC: 41 U/L — HIGH
BASOPHILS # BLD AUTO: 0.01 K/UL — SIGNIFICANT CHANGE UP (ref 0–0.2)
BASOPHILS NFR BLD AUTO: 0.1 % — SIGNIFICANT CHANGE UP (ref 0–2)
BILIRUB DIRECT SERPL-MCNC: 0.1 MG/DL — SIGNIFICANT CHANGE UP (ref 0–0.3)
BILIRUB INDIRECT FLD-MCNC: 0.2 MG/DL — SIGNIFICANT CHANGE UP (ref 0.2–1)
BILIRUB SERPL-MCNC: 0.3 MG/DL — LOW (ref 0.4–2)
BUN SERPL-MCNC: 12 MG/DL — SIGNIFICANT CHANGE UP (ref 8–20)
CALCIUM SERPL-MCNC: 8.4 MG/DL — SIGNIFICANT CHANGE UP (ref 8.4–10.5)
CHLORIDE SERPL-SCNC: 102 MMOL/L — SIGNIFICANT CHANGE UP (ref 96–108)
CO2 SERPL-SCNC: 21 MMOL/L — LOW (ref 22–29)
CREAT SERPL-MCNC: 0.68 MG/DL — SIGNIFICANT CHANGE UP (ref 0.5–1.3)
EGFR: 103 ML/MIN/1.73M2 — SIGNIFICANT CHANGE UP
EOSINOPHIL # BLD AUTO: 0 K/UL — SIGNIFICANT CHANGE UP (ref 0–0.5)
EOSINOPHIL NFR BLD AUTO: 0 % — SIGNIFICANT CHANGE UP (ref 0–6)
GLUCOSE SERPL-MCNC: 121 MG/DL — HIGH (ref 70–99)
HCT VFR BLD CALC: 36.5 % — LOW (ref 39–50)
HGB BLD-MCNC: 11.6 G/DL — LOW (ref 13–17)
IMM GRANULOCYTES NFR BLD AUTO: 0.4 % — SIGNIFICANT CHANGE UP (ref 0–0.9)
LYMPHOCYTES # BLD AUTO: 1.31 K/UL — SIGNIFICANT CHANGE UP (ref 1–3.3)
LYMPHOCYTES # BLD AUTO: 9.9 % — LOW (ref 13–44)
MAGNESIUM SERPL-MCNC: 2 MG/DL — SIGNIFICANT CHANGE UP (ref 1.6–2.6)
MCHC RBC-ENTMCNC: 28.9 PG — SIGNIFICANT CHANGE UP (ref 27–34)
MCHC RBC-ENTMCNC: 31.8 GM/DL — LOW (ref 32–36)
MCV RBC AUTO: 90.8 FL — SIGNIFICANT CHANGE UP (ref 80–100)
MONOCYTES # BLD AUTO: 0.94 K/UL — HIGH (ref 0–0.9)
MONOCYTES NFR BLD AUTO: 7.1 % — SIGNIFICANT CHANGE UP (ref 2–14)
NEUTROPHILS # BLD AUTO: 10.92 K/UL — HIGH (ref 1.8–7.4)
NEUTROPHILS NFR BLD AUTO: 82.5 % — HIGH (ref 43–77)
PHOSPHATE SERPL-MCNC: 3.5 MG/DL — SIGNIFICANT CHANGE UP (ref 2.4–4.7)
PLATELET # BLD AUTO: 239 K/UL — SIGNIFICANT CHANGE UP (ref 150–400)
POTASSIUM SERPL-MCNC: 4.5 MMOL/L — SIGNIFICANT CHANGE UP (ref 3.5–5.3)
POTASSIUM SERPL-SCNC: 4.5 MMOL/L — SIGNIFICANT CHANGE UP (ref 3.5–5.3)
PROT SERPL-MCNC: 6.1 G/DL — LOW (ref 6.6–8.7)
RBC # BLD: 4.02 M/UL — LOW (ref 4.2–5.8)
RBC # FLD: 14.6 % — HIGH (ref 10.3–14.5)
SODIUM SERPL-SCNC: 135 MMOL/L — SIGNIFICANT CHANGE UP (ref 135–145)
WBC # BLD: 13.23 K/UL — HIGH (ref 3.8–10.5)
WBC # FLD AUTO: 13.23 K/UL — HIGH (ref 3.8–10.5)

## 2024-06-08 RX ADMIN — BUDESONIDE AND FORMOTEROL FUMARATE DIHYDRATE 2 PUFF(S): 160; 4.5 AEROSOL RESPIRATORY (INHALATION) at 20:51

## 2024-06-08 RX ADMIN — HYDROMORPHONE HYDROCHLORIDE 30 MILLILITER(S): 2 INJECTION INTRAMUSCULAR; INTRAVENOUS; SUBCUTANEOUS at 19:51

## 2024-06-08 RX ADMIN — HYDROMORPHONE HYDROCHLORIDE 30 MILLILITER(S): 2 INJECTION INTRAMUSCULAR; INTRAVENOUS; SUBCUTANEOUS at 07:34

## 2024-06-08 RX ADMIN — Medication 1000 MILLIGRAM(S): at 01:00

## 2024-06-08 RX ADMIN — Medication 400 MILLIGRAM(S): at 06:14

## 2024-06-08 RX ADMIN — ENOXAPARIN SODIUM 40 MILLIGRAM(S): 100 INJECTION SUBCUTANEOUS at 17:39

## 2024-06-08 RX ADMIN — Medication 400 MILLIGRAM(S): at 00:09

## 2024-06-08 NOTE — PROVIDER CONTACT NOTE (OTHER) - ACTION/TREATMENT ORDERED:
Provider to assess at the bedside. No new orders at this time. Care ongoing. PCA use encouraged to help control pain.

## 2024-06-08 NOTE — PROGRESS NOTE ADULT - ASSESSMENT
Assessment: Patient is a 63 y/o male presenting with a D3 mass, suspicious for NET. Now s/p elective partial duodenectomy with primary anastomosis.    Plan:  -MM pain control, continue PCA  -NPO complete/IVF  -NGT to stay until UGI on Monday, may remove then  -Abx x24 hours   -Lovenox tonight   -Arreaga out in AM  -Strict I&Os  -Monitor NGT and drain outputs and quality   -UVALDO

## 2024-06-09 LAB
ANION GAP SERPL CALC-SCNC: 13 MMOL/L — SIGNIFICANT CHANGE UP (ref 5–17)
BUN SERPL-MCNC: 13.8 MG/DL — SIGNIFICANT CHANGE UP (ref 8–20)
CALCIUM SERPL-MCNC: 8.5 MG/DL — SIGNIFICANT CHANGE UP (ref 8.4–10.5)
CHLORIDE SERPL-SCNC: 97 MMOL/L — SIGNIFICANT CHANGE UP (ref 96–108)
CO2 SERPL-SCNC: 24 MMOL/L — SIGNIFICANT CHANGE UP (ref 22–29)
CREAT SERPL-MCNC: 0.6 MG/DL — SIGNIFICANT CHANGE UP (ref 0.5–1.3)
EGFR: 107 ML/MIN/1.73M2 — SIGNIFICANT CHANGE UP
GLUCOSE SERPL-MCNC: 127 MG/DL — HIGH (ref 70–99)
HCT VFR BLD CALC: 35.5 % — LOW (ref 39–50)
HGB BLD-MCNC: 11.4 G/DL — LOW (ref 13–17)
MAGNESIUM SERPL-MCNC: 2 MG/DL — SIGNIFICANT CHANGE UP (ref 1.8–2.6)
MCHC RBC-ENTMCNC: 29.2 PG — SIGNIFICANT CHANGE UP (ref 27–34)
MCHC RBC-ENTMCNC: 32.1 GM/DL — SIGNIFICANT CHANGE UP (ref 32–36)
MCV RBC AUTO: 91 FL — SIGNIFICANT CHANGE UP (ref 80–100)
PHOSPHATE SERPL-MCNC: 2.7 MG/DL — SIGNIFICANT CHANGE UP (ref 2.4–4.7)
PLATELET # BLD AUTO: 251 K/UL — SIGNIFICANT CHANGE UP (ref 150–400)
POTASSIUM SERPL-MCNC: 4.7 MMOL/L — SIGNIFICANT CHANGE UP (ref 3.5–5.3)
POTASSIUM SERPL-SCNC: 4.7 MMOL/L — SIGNIFICANT CHANGE UP (ref 3.5–5.3)
RBC # BLD: 3.9 M/UL — LOW (ref 4.2–5.8)
RBC # FLD: 14.9 % — HIGH (ref 10.3–14.5)
SODIUM SERPL-SCNC: 134 MMOL/L — LOW (ref 135–145)
WBC # BLD: 12.86 K/UL — HIGH (ref 3.8–10.5)
WBC # FLD AUTO: 12.86 K/UL — HIGH (ref 3.8–10.5)

## 2024-06-09 PROCEDURE — 74018 RADEX ABDOMEN 1 VIEW: CPT | Mod: 26

## 2024-06-09 RX ORDER — PANTOPRAZOLE SODIUM 20 MG/1
40 TABLET, DELAYED RELEASE ORAL
Refills: 0 | Status: DISCONTINUED | OUTPATIENT
Start: 2024-06-09 | End: 2024-06-12

## 2024-06-09 RX ADMIN — BUDESONIDE AND FORMOTEROL FUMARATE DIHYDRATE 2 PUFF(S): 160; 4.5 AEROSOL RESPIRATORY (INHALATION) at 09:57

## 2024-06-09 RX ADMIN — SODIUM CHLORIDE 100 MILLILITER(S): 9 INJECTION, SOLUTION INTRAVENOUS at 05:17

## 2024-06-09 RX ADMIN — ENOXAPARIN SODIUM 40 MILLIGRAM(S): 100 INJECTION SUBCUTANEOUS at 18:26

## 2024-06-09 RX ADMIN — HYDROMORPHONE HYDROCHLORIDE 30 MILLILITER(S): 2 INJECTION INTRAMUSCULAR; INTRAVENOUS; SUBCUTANEOUS at 08:27

## 2024-06-09 RX ADMIN — HYDROMORPHONE HYDROCHLORIDE 30 MILLILITER(S): 2 INJECTION INTRAMUSCULAR; INTRAVENOUS; SUBCUTANEOUS at 19:19

## 2024-06-09 RX ADMIN — PANTOPRAZOLE SODIUM 40 MILLIGRAM(S): 20 TABLET, DELAYED RELEASE ORAL at 18:25

## 2024-06-09 RX ADMIN — PANTOPRAZOLE SODIUM 40 MILLIGRAM(S): 20 TABLET, DELAYED RELEASE ORAL at 05:14

## 2024-06-09 RX ADMIN — SODIUM CHLORIDE 100 MILLILITER(S): 9 INJECTION, SOLUTION INTRAVENOUS at 18:25

## 2024-06-09 NOTE — PROGRESS NOTE ADULT - ASSESSMENT
Patient is a 65 y/o male presenting with a D3 mass, suspicious for NET. Now s/p elective partial duodenectomy with primary anastomosis. Patient continued to complain of janet-incisional pain overnight.  He had bloody output from his NGT, which may have been due to a malfunctioning suction device. A KUB was obtained to evaluate NGT position. Patient also passed his TOV, but is still having hematuria. The patient had hematuria upon insertion of his Arreaga in the OR. This was explained to the patient. The patient denies passing gas and having bowel movements at this time.    Plan:  -MM pain control, continue PCA  -NPO complete/IVF  -NGT to stay until UGI on Monday, may remove then  -Abx x24 hours   -Monitor NGT and drain outputs and quality   -UVALDO    Pt evaluated with senior resident and attending Patient is a 63 y/o male presenting with a D3 mass, suspicious for NET. Now s/p elective partial duodenectomy with primary anastomosis. Patient continued to complain of janet-incisional pain overnight.  He had bloody output from his NGT, which may have been due to a malfunctioning suction device. The patient was also started on a PPI. A KUB was obtained to evaluate NGT position. Patient also passed his TOV, but is still having hematuria. The patient had hematuria upon insertion of his Arreaga in the OR. This was explained to the patient. The patient denies passing gas and having bowel movements at this time.    Plan:  -MM pain control, continue PCA  -NPO complete/IVF  -NGT to stay until UGI on Monday, may remove then  -Abx x24 hours   -Monitor NGT and drain outputs and quality   -UVALDO    Pt evaluated with senior resident and attending

## 2024-06-10 LAB
ANION GAP SERPL CALC-SCNC: 13 MMOL/L — SIGNIFICANT CHANGE UP (ref 5–17)
BUN SERPL-MCNC: 14.6 MG/DL — SIGNIFICANT CHANGE UP (ref 8–20)
CALCIUM SERPL-MCNC: 8.6 MG/DL — SIGNIFICANT CHANGE UP (ref 8.4–10.5)
CHLORIDE SERPL-SCNC: 94 MMOL/L — LOW (ref 96–108)
CO2 SERPL-SCNC: 24 MMOL/L — SIGNIFICANT CHANGE UP (ref 22–29)
CREAT SERPL-MCNC: 0.53 MG/DL — SIGNIFICANT CHANGE UP (ref 0.5–1.3)
EGFR: 111 ML/MIN/1.73M2 — SIGNIFICANT CHANGE UP
GLUCOSE SERPL-MCNC: 88 MG/DL — SIGNIFICANT CHANGE UP (ref 70–99)
HCT VFR BLD CALC: 33.7 % — LOW (ref 39–50)
HGB BLD-MCNC: 11.1 G/DL — LOW (ref 13–17)
MAGNESIUM SERPL-MCNC: 2.1 MG/DL — SIGNIFICANT CHANGE UP (ref 1.6–2.6)
MCHC RBC-ENTMCNC: 29.8 PG — SIGNIFICANT CHANGE UP (ref 27–34)
MCHC RBC-ENTMCNC: 32.9 GM/DL — SIGNIFICANT CHANGE UP (ref 32–36)
MCV RBC AUTO: 90.6 FL — SIGNIFICANT CHANGE UP (ref 80–100)
PHOSPHATE SERPL-MCNC: 2.6 MG/DL — SIGNIFICANT CHANGE UP (ref 2.4–4.7)
PLATELET # BLD AUTO: 224 K/UL — SIGNIFICANT CHANGE UP (ref 150–400)
POTASSIUM SERPL-MCNC: 4.5 MMOL/L — SIGNIFICANT CHANGE UP (ref 3.5–5.3)
POTASSIUM SERPL-SCNC: 4.5 MMOL/L — SIGNIFICANT CHANGE UP (ref 3.5–5.3)
RBC # BLD: 3.72 M/UL — LOW (ref 4.2–5.8)
RBC # FLD: 14.5 % — SIGNIFICANT CHANGE UP (ref 10.3–14.5)
SODIUM SERPL-SCNC: 131 MMOL/L — LOW (ref 135–145)
WBC # BLD: 11.39 K/UL — HIGH (ref 3.8–10.5)
WBC # FLD AUTO: 11.39 K/UL — HIGH (ref 3.8–10.5)

## 2024-06-10 PROCEDURE — 74246 X-RAY XM UPR GI TRC 2CNTRST: CPT | Mod: 26

## 2024-06-10 RX ORDER — ACETAMINOPHEN 500 MG
1000 TABLET ORAL EVERY 6 HOURS
Refills: 0 | Status: COMPLETED | OUTPATIENT
Start: 2024-06-10 | End: 2024-06-11

## 2024-06-10 RX ADMIN — Medication 63.75 MILLIMOLE(S): at 08:36

## 2024-06-10 RX ADMIN — PANTOPRAZOLE SODIUM 40 MILLIGRAM(S): 20 TABLET, DELAYED RELEASE ORAL at 05:41

## 2024-06-10 RX ADMIN — BUDESONIDE AND FORMOTEROL FUMARATE DIHYDRATE 2 PUFF(S): 160; 4.5 AEROSOL RESPIRATORY (INHALATION) at 08:34

## 2024-06-10 RX ADMIN — BUDESONIDE AND FORMOTEROL FUMARATE DIHYDRATE 2 PUFF(S): 160; 4.5 AEROSOL RESPIRATORY (INHALATION) at 21:19

## 2024-06-10 RX ADMIN — Medication 1000 MILLIGRAM(S): at 13:28

## 2024-06-10 RX ADMIN — PANTOPRAZOLE SODIUM 40 MILLIGRAM(S): 20 TABLET, DELAYED RELEASE ORAL at 18:21

## 2024-06-10 RX ADMIN — ENOXAPARIN SODIUM 40 MILLIGRAM(S): 100 INJECTION SUBCUTANEOUS at 18:21

## 2024-06-10 RX ADMIN — Medication 400 MILLIGRAM(S): at 18:21

## 2024-06-10 RX ADMIN — Medication 1000 MILLIGRAM(S): at 19:21

## 2024-06-10 RX ADMIN — Medication 400 MILLIGRAM(S): at 12:39

## 2024-06-10 NOTE — PROGRESS NOTE ADULT - ASSESSMENT
Patient is a 63 y/o male presenting with a D3 mass, suspicious for NET. Now s/p elective partial duodenectomy with primary anastomosis. Patient continued to complain of janet-incisional pain overnight.  He had bloody output from his NGT, which may have been due to a malfunctioning suction device. The patient was also started on a PPI. A KUB was obtained to evaluate NGT position. Patient also passed his TOV, but is still having hematuria. The patient had hematuria upon insertion of his Arreaga in the OR. This was explained to the patient. The patient denies passing gas and having bowel movements at this time.    Plan:  -MM pain control, likely dc PCA today  -NPO complete/IVF  -NGT to stay until UGI. f/u results. Not having bowel function and is mildly distended on exam. Will re-evaluate later today for discontinuation of NGT.  -Completed abx  -DVT ppx  -Home meds   -Monitor NGT and drain outputs and quality   -UVALDO  -PPI BID

## 2024-06-11 PROBLEM — K31.89 OTHER DISEASES OF STOMACH AND DUODENUM: Chronic | Status: ACTIVE | Noted: 2024-06-05

## 2024-06-11 LAB
ANION GAP SERPL CALC-SCNC: 15 MMOL/L — SIGNIFICANT CHANGE UP (ref 5–17)
BASOPHILS # BLD AUTO: 0.03 K/UL — SIGNIFICANT CHANGE UP (ref 0–0.2)
BASOPHILS NFR BLD AUTO: 0.3 % — SIGNIFICANT CHANGE UP (ref 0–2)
BUN SERPL-MCNC: 10.1 MG/DL — SIGNIFICANT CHANGE UP (ref 8–20)
CALCIUM SERPL-MCNC: 8.6 MG/DL — SIGNIFICANT CHANGE UP (ref 8.4–10.5)
CHLORIDE SERPL-SCNC: 98 MMOL/L — SIGNIFICANT CHANGE UP (ref 96–108)
CO2 SERPL-SCNC: 20 MMOL/L — LOW (ref 22–29)
CREAT SERPL-MCNC: 0.54 MG/DL — SIGNIFICANT CHANGE UP (ref 0.5–1.3)
EGFR: 111 ML/MIN/1.73M2 — SIGNIFICANT CHANGE UP
EOSINOPHIL # BLD AUTO: 0.19 K/UL — SIGNIFICANT CHANGE UP (ref 0–0.5)
EOSINOPHIL NFR BLD AUTO: 2.1 % — SIGNIFICANT CHANGE UP (ref 0–6)
GLUCOSE SERPL-MCNC: 75 MG/DL — SIGNIFICANT CHANGE UP (ref 70–99)
HCT VFR BLD CALC: 33.8 % — LOW (ref 39–50)
HGB BLD-MCNC: 11.1 G/DL — LOW (ref 13–17)
IMM GRANULOCYTES NFR BLD AUTO: 0.3 % — SIGNIFICANT CHANGE UP (ref 0–0.9)
LYMPHOCYTES # BLD AUTO: 1.83 K/UL — SIGNIFICANT CHANGE UP (ref 1–3.3)
LYMPHOCYTES # BLD AUTO: 19.8 % — SIGNIFICANT CHANGE UP (ref 13–44)
MAGNESIUM SERPL-MCNC: 2.1 MG/DL — SIGNIFICANT CHANGE UP (ref 1.6–2.6)
MCHC RBC-ENTMCNC: 29.7 PG — SIGNIFICANT CHANGE UP (ref 27–34)
MCHC RBC-ENTMCNC: 32.8 GM/DL — SIGNIFICANT CHANGE UP (ref 32–36)
MCV RBC AUTO: 90.4 FL — SIGNIFICANT CHANGE UP (ref 80–100)
MONOCYTES # BLD AUTO: 0.92 K/UL — HIGH (ref 0–0.9)
MONOCYTES NFR BLD AUTO: 10 % — SIGNIFICANT CHANGE UP (ref 2–14)
NEUTROPHILS # BLD AUTO: 6.22 K/UL — SIGNIFICANT CHANGE UP (ref 1.8–7.4)
NEUTROPHILS NFR BLD AUTO: 67.5 % — SIGNIFICANT CHANGE UP (ref 43–77)
PHOSPHATE SERPL-MCNC: 3.1 MG/DL — SIGNIFICANT CHANGE UP (ref 2.4–4.7)
PLATELET # BLD AUTO: 211 K/UL — SIGNIFICANT CHANGE UP (ref 150–400)
POTASSIUM SERPL-MCNC: 4 MMOL/L — SIGNIFICANT CHANGE UP (ref 3.5–5.3)
POTASSIUM SERPL-SCNC: 4 MMOL/L — SIGNIFICANT CHANGE UP (ref 3.5–5.3)
RBC # BLD: 3.74 M/UL — LOW (ref 4.2–5.8)
RBC # FLD: 14.5 % — SIGNIFICANT CHANGE UP (ref 10.3–14.5)
SODIUM SERPL-SCNC: 133 MMOL/L — LOW (ref 135–145)
WBC # BLD: 9.22 K/UL — SIGNIFICANT CHANGE UP (ref 3.8–10.5)
WBC # FLD AUTO: 9.22 K/UL — SIGNIFICANT CHANGE UP (ref 3.8–10.5)

## 2024-06-11 RX ADMIN — BUDESONIDE AND FORMOTEROL FUMARATE DIHYDRATE 2 PUFF(S): 160; 4.5 AEROSOL RESPIRATORY (INHALATION) at 09:24

## 2024-06-11 RX ADMIN — PANTOPRAZOLE SODIUM 40 MILLIGRAM(S): 20 TABLET, DELAYED RELEASE ORAL at 06:37

## 2024-06-11 RX ADMIN — Medication 1000 MILLIGRAM(S): at 00:10

## 2024-06-11 RX ADMIN — PANTOPRAZOLE SODIUM 40 MILLIGRAM(S): 20 TABLET, DELAYED RELEASE ORAL at 18:03

## 2024-06-11 RX ADMIN — Medication 1000 MILLIGRAM(S): at 07:00

## 2024-06-11 RX ADMIN — BUDESONIDE AND FORMOTEROL FUMARATE DIHYDRATE 2 PUFF(S): 160; 4.5 AEROSOL RESPIRATORY (INHALATION) at 21:38

## 2024-06-11 RX ADMIN — Medication 400 MILLIGRAM(S): at 00:09

## 2024-06-11 RX ADMIN — ENOXAPARIN SODIUM 40 MILLIGRAM(S): 100 INJECTION SUBCUTANEOUS at 18:05

## 2024-06-11 RX ADMIN — Medication 400 MILLIGRAM(S): at 06:37

## 2024-06-11 NOTE — CHART NOTE - NSCHARTNOTEFT_GEN_A_CORE
Patient seen and examined at bedside. Pain well controlled with PCA. No n/v or other acute complaints. Is doing well postoperatively.     MEDICATIONS  (STANDING):  acetaminophen   IVPB .. 1000 milliGRAM(s) IV Intermittent every 6 hours  budesonide 160 MICROgram(s)/formoterol 4.5 MICROgram(s) Inhaler 2 Puff(s) Inhalation two times a day  enoxaparin Injectable 40 milliGRAM(s) SubCutaneous every 24 hours  HYDROmorphone PCA (1 mG/mL) 30 milliLiter(s) PCA Continuous PCA Continuous  lactated ringers. 1000 milliLiter(s) (100 mL/Hr) IV Continuous <Continuous>  sodium phosphate 30 milliMole(s)/500 mL IVPB 30 milliMole(s) IV Intermittent once    MEDICATIONS  (PRN):  albuterol    90 MICROgram(s) HFA Inhaler 2 Puff(s) Inhalation every 6 hours PRN for shortness of breath and/or wheezing  naloxone Injectable 0.1 milliGRAM(s) IV Push every 3 minutes PRN For ANY of the following changes in patient status:  A. RR LESS THAN 10 breaths per minute, B. Oxygen saturation LESS THAN 90%, C. Sedation score of 6  ondansetron Injectable 4 milliGRAM(s) IV Push every 6 hours PRN Nausea    Vital Signs Last 24 Hrs  T(C): 36.3 (07 Jun 2024 15:32), Max: 36.7 (07 Jun 2024 06:25)  T(F): 97.4 (07 Jun 2024 15:32), Max: 98 (07 Jun 2024 06:25)  HR: 81 (07 Jun 2024 15:32) (75 - 86)  BP: 126/75 (07 Jun 2024 15:32) (119/87 - 152/79)  BP(mean): 89 (07 Jun 2024 14:20) (89 - 105)  RR: 17 (07 Jun 2024 15:32) (10 - 21)  SpO2: 93% (07 Jun 2024 15:32) (91% - 99%)    Parameters below as of 07 Jun 2024 15:32  Patient On (Oxygen Delivery Method): nasal cannula  O2 Flow (L/min): 3                          12.4   8.65  )-----------( 217      ( 07 Jun 2024 12:48 )             38.0   06-07    138  |  108  |  11.1  ----------------------------<  121<H>  5.1   |  20.0<L>  |  0.80    Ca    7.7<L>      07 Jun 2024 11:20  Phos  2.6     06-07  Mg     1.7     06-07    I&O's Summary    07 Jun 2024 07:01  -  07 Jun 2024 17:55  --------------------------------------------------------  IN: 300 mL / OUT: 540 mL / NET: -240 mL      Exam:  Gen: pt lying in bed, alert, in NAD  Resp: unlabored  CVS: RRR  Abd: soft, appropriate perincisional ttp, nd and no rebound or guarding. Drain with serosang in bulb. NGT in place with bilious effluent.  Ext: moving all extremities spontaneously, sensation intact, pulses 2+    Assessment: Patient is a 65 y/o male presenting with a D3 mass, suspicious for NET. Now s/p elective partial duodenectomy with primary anastomosis.    Plan:  -MM pain control, continue PCA  -NPO complete/IVF  -NGT to stay until UGI on Monday, may remove then  -Abx x24 hours   -Lovenox tonight   -Arreaga out in AM  -Strict I&Os  -Monitor NGT and drain outputs and quality   -UVALDO
LENY drain removed, patient tolerated removal well, no complications or bleeding. Drain site dressed with 4x4 and tape

## 2024-06-12 ENCOUNTER — TRANSCRIPTION ENCOUNTER (OUTPATIENT)
Age: 64
End: 2024-06-12

## 2024-06-12 VITALS
HEART RATE: 78 BPM | SYSTOLIC BLOOD PRESSURE: 142 MMHG | OXYGEN SATURATION: 96 % | DIASTOLIC BLOOD PRESSURE: 76 MMHG | TEMPERATURE: 98 F | RESPIRATION RATE: 18 BRPM

## 2024-06-12 LAB
ANION GAP SERPL CALC-SCNC: 14 MMOL/L — SIGNIFICANT CHANGE UP (ref 5–17)
ANION GAP SERPL CALC-SCNC: 18 MMOL/L — HIGH (ref 5–17)
BASOPHILS # BLD AUTO: 0.04 K/UL — SIGNIFICANT CHANGE UP (ref 0–0.2)
BASOPHILS NFR BLD AUTO: 0.4 % — SIGNIFICANT CHANGE UP (ref 0–2)
BUN SERPL-MCNC: 9.2 MG/DL — SIGNIFICANT CHANGE UP (ref 8–20)
BUN SERPL-MCNC: 9.3 MG/DL — SIGNIFICANT CHANGE UP (ref 8–20)
CALCIUM SERPL-MCNC: 8.7 MG/DL — SIGNIFICANT CHANGE UP (ref 8.4–10.5)
CALCIUM SERPL-MCNC: 8.9 MG/DL — SIGNIFICANT CHANGE UP (ref 8.4–10.5)
CHLORIDE SERPL-SCNC: 98 MMOL/L — SIGNIFICANT CHANGE UP (ref 96–108)
CHLORIDE SERPL-SCNC: 99 MMOL/L — SIGNIFICANT CHANGE UP (ref 96–108)
CO2 SERPL-SCNC: 20 MMOL/L — LOW (ref 22–29)
CO2 SERPL-SCNC: 21 MMOL/L — LOW (ref 22–29)
CREAT SERPL-MCNC: 0.5 MG/DL — SIGNIFICANT CHANGE UP (ref 0.5–1.3)
CREAT SERPL-MCNC: 0.6 MG/DL — SIGNIFICANT CHANGE UP (ref 0.5–1.3)
EGFR: 107 ML/MIN/1.73M2 — SIGNIFICANT CHANGE UP
EGFR: 113 ML/MIN/1.73M2 — SIGNIFICANT CHANGE UP
EOSINOPHIL # BLD AUTO: 0.22 K/UL — SIGNIFICANT CHANGE UP (ref 0–0.5)
EOSINOPHIL NFR BLD AUTO: 2.4 % — SIGNIFICANT CHANGE UP (ref 0–6)
GLUCOSE SERPL-MCNC: 100 MG/DL — HIGH (ref 70–99)
GLUCOSE SERPL-MCNC: 80 MG/DL — SIGNIFICANT CHANGE UP (ref 70–99)
HCT VFR BLD CALC: 34.6 % — LOW (ref 39–50)
HGB BLD-MCNC: 11.4 G/DL — LOW (ref 13–17)
IMM GRANULOCYTES NFR BLD AUTO: 0.3 % — SIGNIFICANT CHANGE UP (ref 0–0.9)
LYMPHOCYTES # BLD AUTO: 1.93 K/UL — SIGNIFICANT CHANGE UP (ref 1–3.3)
LYMPHOCYTES # BLD AUTO: 21.1 % — SIGNIFICANT CHANGE UP (ref 13–44)
MAGNESIUM SERPL-MCNC: 2 MG/DL — SIGNIFICANT CHANGE UP (ref 1.8–2.6)
MCHC RBC-ENTMCNC: 29.6 PG — SIGNIFICANT CHANGE UP (ref 27–34)
MCHC RBC-ENTMCNC: 32.9 GM/DL — SIGNIFICANT CHANGE UP (ref 32–36)
MCV RBC AUTO: 89.9 FL — SIGNIFICANT CHANGE UP (ref 80–100)
MONOCYTES # BLD AUTO: 0.93 K/UL — HIGH (ref 0–0.9)
MONOCYTES NFR BLD AUTO: 10.2 % — SIGNIFICANT CHANGE UP (ref 2–14)
NEUTROPHILS # BLD AUTO: 5.99 K/UL — SIGNIFICANT CHANGE UP (ref 1.8–7.4)
NEUTROPHILS NFR BLD AUTO: 65.6 % — SIGNIFICANT CHANGE UP (ref 43–77)
PHOSPHATE SERPL-MCNC: 2.9 MG/DL — SIGNIFICANT CHANGE UP (ref 2.4–4.7)
PLATELET # BLD AUTO: 258 K/UL — SIGNIFICANT CHANGE UP (ref 150–400)
POTASSIUM SERPL-MCNC: 3.7 MMOL/L — SIGNIFICANT CHANGE UP (ref 3.5–5.3)
POTASSIUM SERPL-MCNC: 3.8 MMOL/L — SIGNIFICANT CHANGE UP (ref 3.5–5.3)
POTASSIUM SERPL-SCNC: 3.7 MMOL/L — SIGNIFICANT CHANGE UP (ref 3.5–5.3)
POTASSIUM SERPL-SCNC: 3.8 MMOL/L — SIGNIFICANT CHANGE UP (ref 3.5–5.3)
RBC # BLD: 3.85 M/UL — LOW (ref 4.2–5.8)
RBC # FLD: 14.3 % — SIGNIFICANT CHANGE UP (ref 10.3–14.5)
SODIUM SERPL-SCNC: 133 MMOL/L — LOW (ref 135–145)
SODIUM SERPL-SCNC: 137 MMOL/L — SIGNIFICANT CHANGE UP (ref 135–145)
WBC # BLD: 9.14 K/UL — SIGNIFICANT CHANGE UP (ref 3.8–10.5)
WBC # FLD AUTO: 9.14 K/UL — SIGNIFICANT CHANGE UP (ref 3.8–10.5)

## 2024-06-12 PROCEDURE — 88302 TISSUE EXAM BY PATHOLOGIST: CPT

## 2024-06-12 PROCEDURE — 36415 COLL VENOUS BLD VENIPUNCTURE: CPT

## 2024-06-12 PROCEDURE — 85027 COMPLETE CBC AUTOMATED: CPT

## 2024-06-12 PROCEDURE — 88341 IMHCHEM/IMCYTCHM EA ADD ANTB: CPT

## 2024-06-12 PROCEDURE — 80076 HEPATIC FUNCTION PANEL: CPT

## 2024-06-12 PROCEDURE — 83735 ASSAY OF MAGNESIUM: CPT

## 2024-06-12 PROCEDURE — 88342 IMHCHEM/IMCYTCHM 1ST ANTB: CPT

## 2024-06-12 PROCEDURE — 74246 X-RAY XM UPR GI TRC 2CNTRST: CPT

## 2024-06-12 PROCEDURE — 88360 TUMOR IMMUNOHISTOCHEM/MANUAL: CPT

## 2024-06-12 PROCEDURE — 74018 RADEX ABDOMEN 1 VIEW: CPT

## 2024-06-12 PROCEDURE — 84100 ASSAY OF PHOSPHORUS: CPT

## 2024-06-12 PROCEDURE — 85025 COMPLETE CBC W/AUTO DIFF WBC: CPT

## 2024-06-12 PROCEDURE — C9399: CPT

## 2024-06-12 PROCEDURE — 94640 AIRWAY INHALATION TREATMENT: CPT

## 2024-06-12 PROCEDURE — 88309 TISSUE EXAM BY PATHOLOGIST: CPT

## 2024-06-12 PROCEDURE — C1889: CPT

## 2024-06-12 PROCEDURE — 80048 BASIC METABOLIC PNL TOTAL CA: CPT

## 2024-06-12 RX ORDER — FLUTICASONE PROPIONATE AND SALMETEROL 50; 250 UG/1; UG/1
1 POWDER ORAL; RESPIRATORY (INHALATION)
Refills: 0 | DISCHARGE

## 2024-06-12 RX ORDER — SODIUM CHLORIDE 9 MG/ML
1000 INJECTION, SOLUTION INTRAVENOUS ONCE
Refills: 0 | Status: COMPLETED | OUTPATIENT
Start: 2024-06-12 | End: 2024-06-12

## 2024-06-12 RX ADMIN — BUDESONIDE AND FORMOTEROL FUMARATE DIHYDRATE 2 PUFF(S): 160; 4.5 AEROSOL RESPIRATORY (INHALATION) at 08:06

## 2024-06-12 RX ADMIN — PANTOPRAZOLE SODIUM 40 MILLIGRAM(S): 20 TABLET, DELAYED RELEASE ORAL at 05:31

## 2024-06-12 RX ADMIN — SODIUM CHLORIDE 1000 MILLILITER(S): 9 INJECTION, SOLUTION INTRAVENOUS at 08:06

## 2024-06-12 NOTE — PROGRESS NOTE ADULT - SUBJECTIVE AND OBJECTIVE BOX
INTERVAL HPI/OVERNIGHT EVENTS/SUBJECTIVE:  Pt seen and examined. Pain well controlled. Tolerating FLD without n/v. oob sitting in chair when seen. Voiding. kiana drain removed yesterday. noted gap acidosis on labs this AM. No overnight events and states he feels well.     ICU Vital Signs Last 24 Hrs  T(C): 36.7 (12 Jun 2024 04:30), Max: 37.1 (11 Jun 2024 17:25)  T(F): 98 (12 Jun 2024 04:30), Max: 98.7 (11 Jun 2024 17:25)  HR: 80 (12 Jun 2024 04:30) (80 - 92)  BP: 142/72 (12 Jun 2024 04:30) (135/83 - 153/82)  BP(mean): --  ABP: --  ABP(mean): --  RR: 18 (12 Jun 2024 04:30) (18 - 18)  SpO2: 98% (12 Jun 2024 04:36) (93% - 98%)    O2 Parameters below as of 12 Jun 2024 04:36  Patient On (Oxygen Delivery Method): room air      I&O's Detail    11 Jun 2024 07:01  -  12 Jun 2024 07:00  --------------------------------------------------------  IN:    Lactated Ringers: 1200 mL  Total IN: 1200 mL    OUT:    Voided (mL): 200 mL  Total OUT: 200 mL    Total NET: 1000 mL      MEDICATIONS  (STANDING):  budesonide 160 MICROgram(s)/formoterol 4.5 MICROgram(s) Inhaler 2 Puff(s) Inhalation two times a day  enoxaparin Injectable 40 milliGRAM(s) SubCutaneous every 24 hours  lactated ringers Bolus 1000 milliLiter(s) IV Bolus once  pantoprazole  Injectable 40 milliGRAM(s) IV Push two times a day    MEDICATIONS  (PRN):  albuterol    90 MICROgram(s) HFA Inhaler 2 Puff(s) Inhalation every 6 hours PRN for shortness of breath and/or wheezing  naloxone Injectable 0.1 milliGRAM(s) IV Push every 3 minutes PRN For ANY of the following changes in patient status:  A. RR LESS THAN 10 breaths per minute, B. Oxygen saturation LESS THAN 90%, C. Sedation score of 6  ondansetron Injectable 4 milliGRAM(s) IV Push every 6 hours PRN Nausea      MISC:     PHYSICAL EXAM:    Gen: NAD, sitting comfortably in chair  Neurological: aaox3  Pulmonary: non labored, no conversational dyspnea  Gastrointestinal: nt, mildly distended, no peritoneal signs, soft  Genitourinary: voiding independently        LABS:  CBC Full  -  ( 12 Jun 2024 05:06 )  WBC Count : 9.14 K/uL  RBC Count : 3.85 M/uL  Hemoglobin : 11.4 g/dL  Hematocrit : 34.6 %  Platelet Count - Automated : 258 K/uL  Mean Cell Volume : 89.9 fl  Mean Cell Hemoglobin : 29.6 pg  Mean Cell Hemoglobin Concentration : 32.9 gm/dL  Auto Neutrophil # : 5.99 K/uL  Auto Lymphocyte # : 1.93 K/uL  Auto Monocyte # : 0.93 K/uL  Auto Eosinophil # : 0.22 K/uL  Auto Basophil # : 0.04 K/uL  Auto Neutrophil % : 65.6 %  Auto Lymphocyte % : 21.1 %  Auto Monocyte % : 10.2 %  Auto Eosinophil % : 2.4 %  Auto Basophil % : 0.4 %    06-12    137  |  99  |  9.2  ----------------------------<  80  3.7   |  20.0<L>  |  0.60    Ca    8.9      12 Jun 2024 05:06  Phos  2.9     06-12  Mg     2.0     06-12        Urinalysis Basic - ( 12 Jun 2024 05:06 )    Color: x / Appearance: x / SG: x / pH: x  Gluc: 80 mg/dL / Ketone: x  / Bili: x / Urobili: x   Blood: x / Protein: x / Nitrite: x   Leuk Esterase: x / RBC: x / WBC x   Sq Epi: x / Non Sq Epi: x / Bacteria: x      ASSESSMENT/PLAN:  64yMale presenting with a D3 mass, suspicious for NET. Now s/p elective partial duodenectomy with primary anastomosis. Passing flatus and having BM's. tolerating FLD. gap acidosis noted on labs this AM    Plan:  -will be discharged on FLD for 1 week until seen by attending at his follow-up apt  -1 L bolus for gap acidosis, will recheck BMP after  - UGIS performed, no leak   -Completed abx  -DVT ppx  -Home meds   -PPI BID  -potential discharge today
  INTERVAL HPI/OVERNIGHT EVENTS/SUBJECTIVE:  Pt seen and examined. Passing flatus and having bowel movements. Pain well controlled. NGT removed yesterday. Tolerating sips and chips.     ICU Vital Signs Last 24 Hrs  T(C): 36.7 (11 Jun 2024 04:30), Max: 37.1 (10 Kelton 2024 16:46)  T(F): 98.1 (11 Jun 2024 04:30), Max: 98.8 (10 Kelton 2024 16:46)  HR: 80 (11 Jun 2024 04:30) (80 - 110)  BP: 142/75 (11 Jun 2024 04:30) (138/82 - 167/73)  BP(mean): --  ABP: --  ABP(mean): --  RR: 18 (11 Jun 2024 04:30) (17 - 18)  SpO2: 97% (11 Jun 2024 04:30) (91% - 99%)    O2 Parameters below as of 11 Jun 2024 04:30  Patient On (Oxygen Delivery Method): room air      I&O's Detail    10 Kelton 2024 07:01  -  11 Jun 2024 07:00  --------------------------------------------------------  IN:    Lactated Ringers: 2200 mL  Total IN: 2200 mL    OUT:    Bulb (mL): 65 mL    Voided (mL): 400 mL  Total OUT: 465 mL    Total NET: 1735 mL    MEDICATIONS  (STANDING):  budesonide 160 MICROgram(s)/formoterol 4.5 MICROgram(s) Inhaler 2 Puff(s) Inhalation two times a day  enoxaparin Injectable 40 milliGRAM(s) SubCutaneous every 24 hours  lactated ringers. 1000 milliLiter(s) (100 mL/Hr) IV Continuous <Continuous>  pantoprazole  Injectable 40 milliGRAM(s) IV Push two times a day    MEDICATIONS  (PRN):  albuterol    90 MICROgram(s) HFA Inhaler 2 Puff(s) Inhalation every 6 hours PRN for shortness of breath and/or wheezing  naloxone Injectable 0.1 milliGRAM(s) IV Push every 3 minutes PRN For ANY of the following changes in patient status:  A. RR LESS THAN 10 breaths per minute, B. Oxygen saturation LESS THAN 90%, C. Sedation score of 6  ondansetron Injectable 4 milliGRAM(s) IV Push every 6 hours PRN Nausea        MISC:     PHYSICAL EXAM:    Gen: NAD, laying comfortably  Neurological: aaox3  Pulmonary: non-labored  Gastrointestinal: soft, ntnd  Genitourinary: voiding independently  Extremities: DIAL    LABS:  CBC Full  -  ( 11 Jun 2024 03:52 )  WBC Count : 9.22 K/uL  RBC Count : 3.74 M/uL  Hemoglobin : 11.1 g/dL  Hematocrit : 33.8 %  Platelet Count - Automated : 211 K/uL  Mean Cell Volume : 90.4 fl  Mean Cell Hemoglobin : 29.7 pg  Mean Cell Hemoglobin Concentration : 32.8 gm/dL  Auto Neutrophil # : 6.22 K/uL  Auto Lymphocyte # : 1.83 K/uL  Auto Monocyte # : 0.92 K/uL  Auto Eosinophil # : 0.19 K/uL  Auto Basophil # : 0.03 K/uL  Auto Neutrophil % : 67.5 %  Auto Lymphocyte % : 19.8 %  Auto Monocyte % : 10.0 %  Auto Eosinophil % : 2.1 %  Auto Basophil % : 0.3 %    06-11    133<L>  |  98  |  10.1  ----------------------------<  75  4.0   |  20.0<L>  |  0.54    Ca    8.6      11 Jun 2024 03:52  Phos  3.1     06-11  Mg     2.1     06-11        Urinalysis Basic - ( 11 Jun 2024 03:52 )    Color: x / Appearance: x / SG: x / pH: x  Gluc: 75 mg/dL / Ketone: x  / Bili: x / Urobili: x   Blood: x / Protein: x / Nitrite: x   Leuk Esterase: x / RBC: x / WBC x   Sq Epi: x / Non Sq Epi: x / Bacteria: x      ASSESSMENT/PLAN:  65 y/o male presenting with a D3 mass, suspicious for NET. Now s/p elective partial duodenectomy with primary anastomosis. Passing flatus and having BM's. NGT removed yesterday. tolerating sips and chips.     Plan:  -ADAT  - UGIS performed, no leak   -Completed abx  -DVT ppx  -Home meds   -PPI BID
Patient seen and examined at bedside. ADELINA. Pain is well controlled, no acute complaints this morning. No bowel function yet. Started on PPI over the weekend.     MEDICATIONS  (STANDING):  budesonide 160 MICROgram(s)/formoterol 4.5 MICROgram(s) Inhaler 2 Puff(s) Inhalation two times a day  enoxaparin Injectable 40 milliGRAM(s) SubCutaneous every 24 hours  HYDROmorphone PCA (1 mG/mL) 30 milliLiter(s) PCA Continuous PCA Continuous  lactated ringers. 1000 milliLiter(s) (100 mL/Hr) IV Continuous <Continuous>  pantoprazole  Injectable 40 milliGRAM(s) IV Push two times a day  sodium phosphate 15 milliMole(s)/250 mL IVPB 15 milliMole(s) IV Intermittent once    MEDICATIONS  (PRN):  albuterol    90 MICROgram(s) HFA Inhaler 2 Puff(s) Inhalation every 6 hours PRN for shortness of breath and/or wheezing  naloxone Injectable 0.1 milliGRAM(s) IV Push every 3 minutes PRN For ANY of the following changes in patient status:  A. RR LESS THAN 10 breaths per minute, B. Oxygen saturation LESS THAN 90%, C. Sedation score of 6  ondansetron Injectable 4 milliGRAM(s) IV Push every 6 hours PRN Nausea      Vital Signs Last 24 Hrs  T(C): 36.9 (10 Kelton 2024 05:00), Max: 36.9 (10 Kelton 2024 05:00)  T(F): 98.5 (10 Kelton 2024 05:00), Max: 98.5 (10 Kelton 2024 05:00)  HR: 84 (10 Kelton 2024 05:00) (71 - 96)  BP: 125/79 (10 Kelton 2024 05:00) (118/81 - 151/70)  BP(mean): --  RR: 18 (10 Kelton 2024 05:00) (15 - 19)  SpO2: 94% (10 Kelton 2024 05:00) (90% - 96%)    Parameters below as of 10 Kelton 2024 05:00  Patient On (Oxygen Delivery Method): nasal cannula  O2 Flow (L/min): 1                          11.1   11.39 )-----------( 224      ( 10 Kelton 2024 04:07 )             33.7   06-10    131<L>  |  94<L>  |  14.6  ----------------------------<  88  4.5   |  24.0  |  0.53    Ca    8.6      10 Kelton 2024 04:07  Phos  2.6     06-10  Mg     2.1     06-10    I&O's Summary    08 Jun 2024 07:01  -  09 Jun 2024 07:00  --------------------------------------------------------  IN: 1000 mL / OUT: 780 mL / NET: 220 mL    09 Jun 2024 07:01  -  10 Kelton 2024 06:25  --------------------------------------------------------  IN: 0 mL / OUT: 1120 mL / NET: -1120 mL        Exam:  Gen: pt lying in bed, alert, in NAD  Resp: unlabored  CVS: RRR  Abd: soft, minimal perincisional ttp, mild distension, no rebound or guarding. NGT in place.   Ext: moving all extremities spontaneously, sensation intact, pulses 2+  
INTERVAL HPI/OVERNIGHT EVENTS: Patient complained of janet-incisional pain overnight. He had bloody output from his NGT, which may have been due to a malfunctioning suction device. Patient also passed his TOV, but is still having hematuria. The patient had hematuria upon insertion of his Arreaga in the OR. This was explained to the patient.    STATUS POST: Partial Duodenectomy    POST OPERATIVE DAY #: 2      MEDICATIONS  (STANDING):  budesonide 160 MICROgram(s)/formoterol 4.5 MICROgram(s) Inhaler 2 Puff(s) Inhalation two times a day  enoxaparin Injectable 40 milliGRAM(s) SubCutaneous every 24 hours  HYDROmorphone PCA (1 mG/mL) 30 milliLiter(s) PCA Continuous PCA Continuous  lactated ringers. 1000 milliLiter(s) (100 mL/Hr) IV Continuous <Continuous>  pantoprazole  Injectable 40 milliGRAM(s) IV Push two times a day    MEDICATIONS  (PRN):  albuterol    90 MICROgram(s) HFA Inhaler 2 Puff(s) Inhalation every 6 hours PRN for shortness of breath and/or wheezing  naloxone Injectable 0.1 milliGRAM(s) IV Push every 3 minutes PRN For ANY of the following changes in patient status:  A. RR LESS THAN 10 breaths per minute, B. Oxygen saturation LESS THAN 90%, C. Sedation score of 6  ondansetron Injectable 4 milliGRAM(s) IV Push every 6 hours PRN Nausea      Vital Signs Last 24 Hrs  T(C): 36.8 (08 Jun 2024 23:40), Max: 36.9 (08 Jun 2024 07:55)  T(F): 98.2 (08 Jun 2024 23:40), Max: 98.5 (08 Jun 2024 07:55)  HR: 90 (08 Jun 2024 23:40) (84 - 93)  BP: 164/77 (08 Jun 2024 23:40) (100/65 - 164/77)  BP(mean): --  RR: 18 (08 Jun 2024 23:40) (17 - 18)  SpO2: 90% (08 Jun 2024 23:40) (90% - 95%)    Parameters below as of 08 Jun 2024 23:40  Patient On (Oxygen Delivery Method): room air        Physical Exam:    Respiratory: no accessory muscle use    Cardiovascular: Regular rate    Gastrointestinal: Soft, appropriately tender at incision site, NGT in place with bloody output, LENY drain with serosanguinous fluid        I&O's Detail    07 Jun 2024 07:01  -  08 Jun 2024 07:00  --------------------------------------------------------  IN:    Lactated Ringers: 300 mL    Lactated Ringers: 400 mL  Total IN: 700 mL    OUT:    Bulb (mL): 110 mL    Indwelling Catheter - Urethral (mL): 1350 mL    Nasogastric/Oral tube (mL): 10 mL  Total OUT: 1470 mL    Total NET: -770 mL      08 Jun 2024 07:01  -  09 Jun 2024 01:42  --------------------------------------------------------  IN:  Total IN: 0 mL    OUT:    Bulb (mL): 10 mL    Indwelling Catheter - Urethral (mL): 350 mL  Total OUT: 360 mL    Total NET: -360 mL          LABS:                        11.6   13.23 )-----------( 239      ( 08 Jun 2024 04:52 )             36.5     06-08    135  |  102  |  12.0  ----------------------------<  121<H>  4.5   |  21.0<L>  |  0.68    Ca    8.4      08 Jun 2024 04:52  Phos  3.5     06-08  Mg     2.0     06-08    TPro  6.1<L>  /  Alb  3.4  /  TBili  0.3<L>  /  DBili  0.1  /  AST  41<H>  /  ALT  78<H>  /  AlkPhos  79  06-08      Urinalysis Basic - ( 08 Jun 2024 04:52 )    Color: x / Appearance: x / SG: x / pH: x  Gluc: 121 mg/dL / Ketone: x  / Bili: x / Urobili: x   Blood: x / Protein: x / Nitrite: x   Leuk Esterase: x / RBC: x / WBC x   Sq Epi: x / Non Sq Epi: x / Bacteria: x        RADIOLOGY & ADDITIONAL STUDIES:
Patient seen and examined at bedside. Complaining of some discomfort around his drain, abdominal pain otherwise well controlled. No other acute complaints and otherwise doing well post op.     MEDICATIONS  (STANDING):  acetaminophen   IVPB .. 1000 milliGRAM(s) IV Intermittent every 6 hours  budesonide 160 MICROgram(s)/formoterol 4.5 MICROgram(s) Inhaler 2 Puff(s) Inhalation two times a day  enoxaparin Injectable 40 milliGRAM(s) SubCutaneous every 24 hours  HYDROmorphone PCA (1 mG/mL) 30 milliLiter(s) PCA Continuous PCA Continuous  lactated ringers. 1000 milliLiter(s) (100 mL/Hr) IV Continuous <Continuous>    MEDICATIONS  (PRN):  albuterol    90 MICROgram(s) HFA Inhaler 2 Puff(s) Inhalation every 6 hours PRN for shortness of breath and/or wheezing  naloxone Injectable 0.1 milliGRAM(s) IV Push every 3 minutes PRN For ANY of the following changes in patient status:  A. RR LESS THAN 10 breaths per minute, B. Oxygen saturation LESS THAN 90%, C. Sedation score of 6  ondansetron Injectable 4 milliGRAM(s) IV Push every 6 hours PRN Nausea      Vital Signs Last 24 Hrs  T(C): 36.5 (08 Jun 2024 04:40), Max: 36.7 (07 Jun 2024 06:25)  T(F): 97.7 (08 Jun 2024 04:40), Max: 98 (07 Jun 2024 06:25)  HR: 87 (08 Jun 2024 04:40) (75 - 93)  BP: 129/74 (08 Jun 2024 04:40) (110/64 - 152/79)  BP(mean): 89 (07 Jun 2024 14:20) (89 - 105)  RR: 18 (08 Jun 2024 04:40) (10 - 21)  SpO2: 95% (08 Jun 2024 04:40) (91% - 99%)    Parameters below as of 08 Jun 2024 04:40  Patient On (Oxygen Delivery Method): nasal cannula  O2 Flow (L/min): 3                          12.4   8.65  )-----------( 217      ( 07 Jun 2024 12:48 )             38.0   06-07    138  |  108  |  11.1  ----------------------------<  121<H>  5.1   |  20.0<L>  |  0.80    Ca    7.7<L>      07 Jun 2024 11:20  Phos  2.6     06-07  Mg     1.7     06-07    I&O's Summary    07 Jun 2024 07:01  -  08 Jun 2024 05:33  --------------------------------------------------------  IN: 700 mL / OUT: 1470 mL / NET: -770 mL      Exam:  Gen: pt lying in bed, alert, in NAD  Resp: unlabored  CVS: RRR  Abd: soft, appropriate perincisional ttp, nd and no rebound or guarding. Drain with serosang in bulb. NGT in place with bilious effluent.  Ext: moving all extremities spontaneously, sensation intact, pulses 2+

## 2024-06-12 NOTE — DISCHARGE NOTE PROVIDER - HOSPITAL COURSE
Pt presented on 6/7 for elective procedure for duodenal mass, had a partial duodenectomy . Pt tolerated the procedure well, was extubated and transferred from pacu to floor. He had a francisco, NGT, and drain placed intra-op. Post op course was uneventful, his NGT was removed and he went for a UGIS on POD 3 which showed NO leak. His francisco was removed and he passed his TOV. HIs diet was advanced and he is tolerating a full liquid diet, he will be discharged home on a FLD. LENY drain was removed. Patient is stable for discharge home per attending.

## 2024-06-12 NOTE — DISCHARGE NOTE PROVIDER - CARE PROVIDER_API CALL
Rosalino Crum  [facility] General Surgical Oncology  [address] [place] [address]  Phone: [phone]  Fax: [phone]  Follow Up Time:

## 2024-06-12 NOTE — DISCHARGE NOTE PROVIDER - NSDCMRMEDTOKEN_GEN_ALL_CORE_FT
Albuterol (Eqv-ProAir HFA) 90 mcg/inh inhalation aerosol: 2 puff(s) inhaled prn as needed for  shortness of breath and/or wheezing

## 2024-06-12 NOTE — DISCHARGE NOTE NURSING/CASE MANAGEMENT/SOCIAL WORK - NSDCVIVACCINE_GEN_ALL_CORE_FT
Tdap; 26-Sep-2019 00:57; Elliot Hall (FLORESITA); [employer]; [id number] (Exp. Date: 26-Jul-2021); IntraMuscular; Deltoid Right.; 0.5 milliLiter(s); VIS (VIS Published: 09-May-2013, VIS Presented: 26-Sep-2019);

## 2024-06-12 NOTE — DISCHARGE NOTE PROVIDER - NSDCCPCAREPLAN_GEN_ALL_CORE_FT
PRINCIPAL DISCHARGE DIAGNOSIS  Diagnosis: Duodenal mass  Assessment and Plan of Treatment: WOUND CARE: You may leave wounds open to air, or cover with gauze and paper tape.   BATHING: Please do not submerge wound underwater. You may shower and/or sponge bathe.    ACTIVITY: No heavy lifting or straining. Otherwise, you may return to your usual level of physical activity.    DIET: You should REMAIN ON A FULL LIQUID DIET AS DISCUSSED WITH YOUR SURGEON. I.E. soups, yogurts, oatmeal, smoothies.   NOTIFY YOUR SURGEON IF: You have any bleeding that does not stop, any pus draining from your wound(s), any fever (over 100.4 F) or chills, persistent nausea/vomiting, persistent diarrhea, or if your pain is not controlled.  FOLLOW-UP: Please follow up with your primary care physician and Dr. Crum at your scheduled appointment. tThe office has already scheduled an appointment with you.   You may take tylenol and/or motrin for pain.

## 2024-06-12 NOTE — DISCHARGE NOTE NURSING/CASE MANAGEMENT/SOCIAL WORK - NSDCPEFALRISK_GEN_ALL_CORE
For information on Fall & Injury Prevention, visit: [email or web address] OR  [email or web address] OR  https://www.cdc.gov/steadi/patient.html

## 2024-06-12 NOTE — DISCHARGE NOTE NURSING/CASE MANAGEMENT/SOCIAL WORK - PATIENT PORTAL LINK FT
You can access the FollowMyHealth Patient Portal offered by [facility] by registering at the following website: [email or web address]. By joining [facility]’s FollowMyHealth portal, you will also be able to view your health information using other applications (apps) compatible with our system.

## 2024-06-13 ENCOUNTER — TRANSCRIPTION ENCOUNTER (OUTPATIENT)
Age: 64
End: 2024-06-13

## 2024-06-19 LAB — SURGICAL PATHOLOGY STUDY: SIGNIFICANT CHANGE UP

## 2024-06-24 ENCOUNTER — TRANSCRIPTION ENCOUNTER (OUTPATIENT)
Age: 64
End: 2024-06-24

## 2024-06-26 PROBLEM — Z98.890 STATUS POST SURGERY: Status: ACTIVE | Noted: 2024-06-26

## 2024-06-27 ENCOUNTER — APPOINTMENT (OUTPATIENT)
Dept: SURGICAL ONCOLOGY | Facility: CLINIC | Age: 64
End: 2024-06-27
Payer: MEDICARE

## 2024-06-27 VITALS
HEIGHT: 62 IN | WEIGHT: 152.5 LBS | HEART RATE: 82 BPM | DIASTOLIC BLOOD PRESSURE: 89 MMHG | TEMPERATURE: 97.3 F | BODY MASS INDEX: 28.06 KG/M2 | OXYGEN SATURATION: 98 % | SYSTOLIC BLOOD PRESSURE: 129 MMHG

## 2024-06-27 DIAGNOSIS — Z98.890 OTHER SPECIFIED POSTPROCEDURAL STATES: ICD-10-CM

## 2024-06-27 PROCEDURE — 99214 OFFICE O/P EST MOD 30 MIN: CPT | Mod: 24

## 2024-06-27 PROCEDURE — G2211 COMPLEX E/M VISIT ADD ON: CPT

## 2024-06-28 ENCOUNTER — TRANSCRIPTION ENCOUNTER (OUTPATIENT)
Age: 64
End: 2024-06-28

## 2024-07-01 ENCOUNTER — APPOINTMENT (OUTPATIENT)
Dept: INTERNAL MEDICINE | Facility: CLINIC | Age: 64
End: 2024-07-01
Payer: MEDICARE

## 2024-07-01 VITALS
HEIGHT: 62 IN | WEIGHT: 152 LBS | BODY MASS INDEX: 27.97 KG/M2 | SYSTOLIC BLOOD PRESSURE: 120 MMHG | DIASTOLIC BLOOD PRESSURE: 78 MMHG | HEART RATE: 72 BPM

## 2024-07-01 DIAGNOSIS — R39.15 URGENCY OF URINATION: ICD-10-CM

## 2024-07-01 DIAGNOSIS — F41.9 ANXIETY DISORDER, UNSPECIFIED: ICD-10-CM

## 2024-07-01 DIAGNOSIS — C49.A0 GASTROINTESTINAL STOMACL TUMOR,UNSPECIFIED SITE: ICD-10-CM

## 2024-07-01 PROCEDURE — G2211 COMPLEX E/M VISIT ADD ON: CPT

## 2024-07-01 PROCEDURE — 99214 OFFICE O/P EST MOD 30 MIN: CPT

## 2024-07-01 RX ORDER — ESCITALOPRAM OXALATE 10 MG/1
10 TABLET ORAL
Qty: 90 | Refills: 2 | Status: ACTIVE | COMMUNITY
Start: 2024-07-01 | End: 1900-01-01

## 2024-07-02 ENCOUNTER — OUTPATIENT (OUTPATIENT)
Dept: OUTPATIENT SERVICES | Facility: HOSPITAL | Age: 64
LOS: 1 days | Discharge: HOME | End: 2024-07-02

## 2024-07-02 DIAGNOSIS — Z98.890 OTHER SPECIFIED POSTPROCEDURAL STATES: Chronic | ICD-10-CM

## 2024-07-02 DIAGNOSIS — D64.9 ANEMIA, UNSPECIFIED: ICD-10-CM

## 2024-07-02 LAB
APPEARANCE: CLEAR
BACTERIA: NEGATIVE /HPF
BILIRUBIN URINE: NEGATIVE
BLOOD URINE: NEGATIVE
C TRACH RRNA SPEC QL NAA+PROBE: NOT DETECTED
CAST: 0 /LPF
COLOR: YELLOW
EPITHELIAL CELLS: 0 /HPF
GLUCOSE QUALITATIVE U: NEGATIVE MG/DL
KETONES URINE: NEGATIVE MG/DL
LEUKOCYTE ESTERASE URINE: NEGATIVE
MICROSCOPIC-UA: NORMAL
N GONORRHOEA RRNA SPEC QL NAA+PROBE: NOT DETECTED
NITRITE URINE: NEGATIVE
PH URINE: 5.5
PROTEIN URINE: NEGATIVE MG/DL
RED BLOOD CELLS URINE: 0 /HPF
SOURCE AMPLIFICATION: NORMAL
SPECIFIC GRAVITY URINE: 1.02
UROBILINOGEN URINE: 0.2 MG/DL
WHITE BLOOD CELLS URINE: 0 /HPF

## 2024-07-03 LAB — BACTERIA UR CULT: NORMAL

## 2024-07-09 ENCOUNTER — RESULT REVIEW (OUTPATIENT)
Age: 64
End: 2024-07-09

## 2024-07-09 ENCOUNTER — APPOINTMENT (OUTPATIENT)
Dept: HEMATOLOGY ONCOLOGY | Facility: CLINIC | Age: 64
End: 2024-07-09
Payer: MEDICARE

## 2024-07-09 VITALS
WEIGHT: 157.19 LBS | SYSTOLIC BLOOD PRESSURE: 135 MMHG | HEART RATE: 88 BPM | TEMPERATURE: 98.2 F | BODY MASS INDEX: 28.93 KG/M2 | DIASTOLIC BLOOD PRESSURE: 84 MMHG | OXYGEN SATURATION: 95 % | HEIGHT: 62 IN

## 2024-07-09 LAB
BASOPHILS # BLD AUTO: 0.1 K/UL — SIGNIFICANT CHANGE UP (ref 0–0.2)
BASOPHILS NFR BLD AUTO: 1.2 % — SIGNIFICANT CHANGE UP (ref 0–2)
EOSINOPHIL # BLD AUTO: 0.2 K/UL — SIGNIFICANT CHANGE UP (ref 0–0.5)
EOSINOPHIL NFR BLD AUTO: 4.4 % — SIGNIFICANT CHANGE UP (ref 0–6)
HCT VFR BLD CALC: 36.2 % — LOW (ref 39–50)
HGB BLD-MCNC: 11.9 G/DL — LOW (ref 13–17)
LYMPHOCYTES # BLD AUTO: 2.4 K/UL — SIGNIFICANT CHANGE UP (ref 1–3.3)
LYMPHOCYTES # BLD AUTO: 46.3 % — HIGH (ref 13–44)
MCHC RBC-ENTMCNC: 30.4 PG — SIGNIFICANT CHANGE UP (ref 27–34)
MCHC RBC-ENTMCNC: 32.8 G/DL — SIGNIFICANT CHANGE UP (ref 32–36)
MCV RBC AUTO: 92.5 FL — SIGNIFICANT CHANGE UP (ref 80–100)
MONOCYTES # BLD AUTO: 0.5 K/UL — SIGNIFICANT CHANGE UP (ref 0–0.9)
MONOCYTES NFR BLD AUTO: 8.9 % — SIGNIFICANT CHANGE UP (ref 2–14)
NEUTROPHILS # BLD AUTO: 2 K/UL — SIGNIFICANT CHANGE UP (ref 1.8–7.4)
NEUTROPHILS NFR BLD AUTO: 39.2 % — LOW (ref 43–77)
PLATELET # BLD AUTO: 222 K/UL — SIGNIFICANT CHANGE UP (ref 150–400)
RBC # BLD: 3.92 M/UL — LOW (ref 4.2–5.8)
RBC # FLD: 13.4 % — SIGNIFICANT CHANGE UP (ref 10.3–14.5)
WBC # BLD: 5.2 K/UL — SIGNIFICANT CHANGE UP (ref 3.8–10.5)
WBC # FLD AUTO: 5.2 K/UL — SIGNIFICANT CHANGE UP (ref 3.8–10.5)

## 2024-07-09 PROCEDURE — 99205 OFFICE O/P NEW HI 60 MIN: CPT

## 2024-07-11 ENCOUNTER — TRANSCRIPTION ENCOUNTER (OUTPATIENT)
Age: 64
End: 2024-07-11

## 2024-07-12 LAB
ALBUMIN SERPL ELPH-MCNC: 4.3 G/DL
ALP BLD-CCNC: 104 U/L
ALT SERPL-CCNC: 48 U/L
APTT BLD: 30.4 SEC
AST SERPL-CCNC: 29 U/L
BILIRUB SERPL-MCNC: 0.2 MG/DL
CALCIUM SERPL-MCNC: 9.1 MG/DL
CHLORIDE SERPL-SCNC: 101 MMOL/L
CO2 SERPL-SCNC: 23 MMOL/L
CREAT SERPL-MCNC: 0.94 MG/DL
EGFR: 90 ML/MIN/1.73M2
GLUCOSE SERPL-MCNC: 113 MG/DL
HAV IGM SER QL: NONREACTIVE
HBV CORE IGG+IGM SER QL: REACTIVE
HBV SURFACE AB SER QL: REACTIVE
HBV SURFACE AG SER QL: NONREACTIVE
HCV AB SER QL: NONREACTIVE
HCV S/CO RATIO: 0.14 S/CO
INR PPP: 0.91 RATIO
MAGNESIUM SERPL-MCNC: 2.2 MG/DL
POTASSIUM SERPL-SCNC: 4.5 MMOL/L
PROT SERPL-MCNC: 7 G/DL
PT BLD: 10.3 SEC
SODIUM SERPL-SCNC: 139 MMOL/L

## 2024-08-01 ENCOUNTER — APPOINTMENT (OUTPATIENT)
Dept: SURGICAL ONCOLOGY | Facility: CLINIC | Age: 64
End: 2024-08-01
Payer: MEDICARE

## 2024-08-01 VITALS
SYSTOLIC BLOOD PRESSURE: 144 MMHG | HEART RATE: 74 BPM | WEIGHT: 159.26 LBS | HEIGHT: 62 IN | TEMPERATURE: 98 F | DIASTOLIC BLOOD PRESSURE: 83 MMHG | BODY MASS INDEX: 29.31 KG/M2 | OXYGEN SATURATION: 98 %

## 2024-08-01 PROCEDURE — 99214 OFFICE O/P EST MOD 30 MIN: CPT | Mod: 24

## 2024-08-01 NOTE — ASSESSMENT
[FreeTextEntry1] : Mr. Olivier is a 63 y/o male presenting with a GIST of the duodenum. Path reviewed, 3.5cm in size, duodenal, low mitotic rate.  Clinically, the physical examination is negative for post-operative infection. There is no evidence of dehiscence, pus, or failure of wound closure. Midline incision staples removed, and patient tolerated removal well. We had an in-depth discussion regarding the patient's pathology, and I have informed him that all margins were negative. Reviewed patient's symptoms and concerns. Encouraged patient to continue with small portioned, frequent meals. He may increase his food intake but advised him to monitor his symptoms and avoid overeating.  Recommendation at this time is for surveillance scans in 1 month, September 2024. All questions and concerns were addressed. Patient and family vocalized understanding and agreement to assessment and treatment plan.   Plan: 1) Surveillance CT in 1 month and follow up in 1 month to discuss CT results.  Rosalino Crum MD  Assistant Professor of Surgery [facility] [employer] [facility] [employer] at [facility] Division of Surgical Oncology [facility] Phone: [phone] Fax: [phone]  I spent 32 minutes reviewing the patient's chart, labs, imaging, interviewing and examining patient, and discussing plan of case with the patient, resident/PA team, and other providers, excluding separately billable procedures and teaching time.  This note was written by Mervat Alicea on 08/01/2024, acting solely as a scribe for Dr. Rosalino Crum MD. I have documented the information dictated during the patient encounter for the following sections: RFV, HPI, ROS, PE, ASSESSMENT/PLAN.  I personally performed the services described in the documentation, reviewed the documentation recorded by the scribe in my presence, and it accurately and completely records my words and actions.

## 2024-08-01 NOTE — RESULTS/DATA
[FreeTextEntry1] : ***SURGERY*** 06/07/24 Open duodenectomy with side-to-side duodenal-jejunal anastomosis. ***PATH*** Final Diagnosis 1. Pre peritoneal fat , excision - Adipose tissue - Negative for malignancy 2. Duodenum with mass , segmental resection - Gastrointestinal Stromal Tumor (GIST), spindle cell type, 3.5 cm - Margins negative for GIST *Synoptic Summary* Specimen Procedure:  Resection - Partial duodenectomy Tumor Tumor Focality:   Unifocal Tumor Site:  Duodenum Histologic Type:   Gastrointestinal stromal tumor, spindle cell type Tumor Size:  3.5 Centimeters (cm) Mitotic Rate:   1 mitoses per 5 mm2 Histologic Grade:   G1, low grade Necrosis:  Not identified Treatment Effect:   No known presurgical therapy Risk Assessment:   Low risk Margins Margin Status:   All margins negative for GIST Closest Margin(s) to GIST:   mesenteric 1.2 cm Distance from GIST to Closest Margin: Regional Lymph Nodes Regional Lymph Node Status:   Not applicable (no regional lymph nodes submitted or found) pTNM Classification (AJCC 8th Edition) pT Category:   pT2 pN Category:   pN not assigned (no nodes submitted or found) Special Studies Immunohistochemical Studies:   KIT ();  DOG1 (ANO1);  CD34, S-100,, Desmin,Caldesmon,Ki67 KIT ():  Positive DOG1 (ANO1):  Positive Molecular Genetic Studies:   Pending Comments Ki67 nuclear proliferation activity less than 1% Best Tumor Blocks for Future Studies Tumor Block(s):   2C

## 2024-08-01 NOTE — PHYSICAL EXAM
[Normal] : oriented to person, place and time, with appropriate affect [de-identified] : RRR [de-identified] : Easy WOB [de-identified] : mildly distended abdomen, mild tenderness to palpation at incision site, no erythema, dehiscence, pus, or infection. No hernia on examination. Incision site staples removed during exam

## 2024-08-01 NOTE — HISTORY OF PRESENT ILLNESS
[de-identified] : Mr. Olivier is a 63 y/o male presenting with a gastrointestinal stromal tumor of the duodenum. S/P open partial duodenectomy with side-to-side duodenal-jejunal anastomosis, and a transversus abdominis plane block on 06/07/2024. Referred by Dr. Lacy.   On 04/15/2024, reported hospital admission for C/C of worsening abdominal pain with symptoms of diarrhea and chills, on-going for about 6 months. During admission, CT demonstrated an enhancing lesion arising from the distal 3rd of the duodenum. Patient underwent an endoscopy with Dr. Lacy, where the lesion was biopsied. Biopsy pathology was indeterminate, however differential supported GIST. Referred to surgical oncology, where we decided to pursue surgical treatment of symptomatic GIST tumor after negative PET Dotetate. He is s/p partial duodenectomy with a duodenojejunostomy and a transversus abdominis plane block on 06/27/2024.  Today, Mr. Miguelito Mayo presents for second post-op visit s/p Open duodenectomy with side-to-side duodenal-jejunal anastomosis on 06/07/24. He recently established care with Dr. Lee, medical oncologist, and no adjuvant imatinib needed.  PMHx of anxiety, asthma, BPH, cognitive impairment, HLD (mixed), STD, and is presenting for follow up for a duodenal mass diagnostic for GIST.

## 2024-08-01 NOTE — REASON FOR VISIT
[Spouse] : spouse [Family Member] : family member [Source: ______] : History obtained from [unfilled] [de-identified] : S/P Open partial duodenectomy with side-to-side duodenal-jejunal anastomosis [de-identified] : 06/07/24 [de-identified] : 54 [de-identified] : 6

## 2024-08-14 ENCOUNTER — APPOINTMENT (OUTPATIENT)
Dept: CT IMAGING | Facility: CLINIC | Age: 64
End: 2024-08-14
Payer: MEDICARE

## 2024-08-14 PROCEDURE — 74170 CT ABD WO CNTRST FLWD CNTRST: CPT | Mod: 26

## 2024-08-20 ENCOUNTER — NON-APPOINTMENT (OUTPATIENT)
Age: 64
End: 2024-08-20

## 2024-09-03 ENCOUNTER — APPOINTMENT (OUTPATIENT)
Dept: SURGICAL ONCOLOGY | Facility: CLINIC | Age: 64
End: 2024-09-03
Payer: MEDICARE

## 2024-09-03 VITALS
TEMPERATURE: 97.5 F | SYSTOLIC BLOOD PRESSURE: 117 MMHG | BODY MASS INDEX: 29.66 KG/M2 | DIASTOLIC BLOOD PRESSURE: 73 MMHG | HEART RATE: 78 BPM | OXYGEN SATURATION: 98 % | WEIGHT: 161.16 LBS | HEIGHT: 62 IN

## 2024-09-03 DIAGNOSIS — Z98.890 OTHER SPECIFIED POSTPROCEDURAL STATES: ICD-10-CM

## 2024-09-03 DIAGNOSIS — R10.13 EPIGASTRIC PAIN: ICD-10-CM

## 2024-09-03 DIAGNOSIS — L29.9 PRURITUS, UNSPECIFIED: ICD-10-CM

## 2024-09-03 PROCEDURE — 99213 OFFICE O/P EST LOW 20 MIN: CPT

## 2024-09-03 NOTE — HISTORY OF PRESENT ILLNESS
[de-identified] : Mr. Olivier is a 65 y/o male presenting with a gastrointestinal stromal tumor of the duodenum. S/P open partial duodenectomy with side-to-side duodenal-jejunal anastomosis, and a transversus abdominis plane block on 06/07/2024. Referred by Dr. Lacy.   On 04/15/2024, reported hospital admission for C/C of worsening abdominal pain with symptoms of diarrhea and chills, on-going for about 6 months. During admission, CT demonstrated an enhancing lesion arising from the distal 3rd of the duodenum. Patient underwent an endoscopy with Dr. Lacy, where the lesion was biopsied. Biopsy pathology was indeterminate, however differential supported GIST. Referred to surgical oncology, where we decided to pursue surgical treatment of symptomatic GIST tumor after negative PET Dotetate. He is s/p partial duodenectomy with a duodenojejunostomy and a transversus abdominis plane block on 06/27/2024.  Today, Mr. Miguelito Mayo presents for second post-op visit s/p Open duodenectomy with side-to-side duodenal-jejunal anastomosis on 06/07/24. He recently established care with Dr. Lee, medical oncologist, and no adjuvant imatinib needed.  PMHx of anxiety, asthma, BPH, cognitive impairment, HLD (mixed), STD, and is presenting for follow up for a duodenal mass diagnostic for GIST.   09/03/24: Pt presents today for an acute visit for a CC of epigastric sharp abdominal pain (5/10) that occurs sporadically for the last 15 days. His recent CT scan showed no evidence of obstruction nor evidence of recurrence. He states his appetite has decreased, +bowel movements with needed assistance of MiraLAX, and flatulence daily. Denies any vomiting or nausea. Has pruritus at superior section of abdominal incision site which is sporadic at times.

## 2024-09-03 NOTE — REVIEW OF SYSTEMS
[Patient Intake Form Reviewed] : Patient intake form was reviewed [Negative] : Psychiatric [FreeTextEntry8] : see hpi [de-identified] : see hpi

## 2024-09-03 NOTE — PHYSICAL EXAM
[Normal] : normal appearance, no rash, nodules, vesicles, ulcers, erythema [de-identified] : RRR [de-identified] : Easy WOB [de-identified] : mild tenderness to palpation at epigastric region, no erythema, dehiscence, pus, or infection. No hernia on examination. Well Healed midline incision  [de-identified] : Well healed midline incision

## 2024-09-03 NOTE — ASSESSMENT
[FreeTextEntry1] : Mr. Olivier is a 63 y/o male presenting with a GIST of the duodenum. Path reviewed, 3.5cm in size, duodenal, low mitotic rate.  Clinically, the physical examination is negative for post-operative infection. There is no evidence of dehiscence, pus, or failure of wound closure. Midline incision staples removed, and patient tolerated removal well. We had an in-depth discussion regarding the patient's pathology, and I have informed him that all margins were negative. Reviewed patient's symptoms and concerns. Encouraged patient to continue with small portioned, frequent meals. He may increase his food intake but advised him to monitor his symptoms and avoid overeating.  Recommendation at this time is for surveillance scans in 1 month, September 2024. All questions and concerns were addressed. Patient and family vocalized understanding and agreement to assessment and treatment plan.   09/03/24: 63 yo M s/p Open partial duodenectomy with side-to-side duodenal-jejunal anastomosis on 06/07/24 with 15 day sporadic sharp pain within epigastric region. Pts recent imaging showed No evidence recurrence status post removal of a gist tumor or obstruction. Pt + for BM with assistance with MiraLAX, flatulence daily. Denies any N/V/F/C. I discussed a plan with both patient and Dr. Crum with regards to garnering another CT with oral contrast to further evaluate for any evidence of mechanical or obstructive abnormalities. I also educated on both the patient and spouse to seek immediate ED care should any concerning obstructive symptoms were to occur such as: worsening abdominal pain, - BM, or flatulence, +N/V. Pt and spouse understood the plan and agreed.   Plan: 1) Seek emergent care should any worsening symptoms present 2) Drew CT w/ oral contrast  3) RTO 09/13/24 to discuss imaging and evaluate S/S 4) Continue Rx medication to alleviate bowel symptoms

## 2024-09-03 NOTE — RESULTS/DATA
[FreeTextEntry1] : ***CT ABD*** 08/14/24 FINDINGS: LOWER CHEST: Within normal limits. LIVER: Within normal limits. BILE DUCTS: Normal caliber. GALLBLADDER: Within normal limits. SPLEEN: Within normal limits. PANCREAS: Within normal limits. ADRENALS: Within normal limits. KIDNEYS/URETERS: Within normal limits. Stable small renal cysts Bowel; Status post excision of primary duodenal lesion. No recurrent mass identified. There is no bowel wall obstruction thickening PERITONEUM/RETROPERITONEUM: Within normal limits. VESSELS: Within normal limits. LYMPH NODES: No lymphadenopathy. ABDOMINAL WALL: Postop changes abdominal wall BONES: Within normal limits.  IMPRESSION:  No evidence recurrence status post removal of a gist tumor

## 2024-09-03 NOTE — REASON FOR VISIT
[Acute Visit] : an acute visit for [Spouse] : spouse [FreeTextEntry2] : S/P Open partial duodenectomy with side-to-side duodenal-jejunal anastomosis on 06/07/24

## 2024-09-11 ENCOUNTER — OUTPATIENT (OUTPATIENT)
Dept: OUTPATIENT SERVICES | Facility: HOSPITAL | Age: 64
LOS: 1 days | End: 2024-09-11
Payer: COMMERCIAL

## 2024-09-11 ENCOUNTER — APPOINTMENT (OUTPATIENT)
Dept: CT IMAGING | Facility: CLINIC | Age: 64
End: 2024-09-11
Payer: MEDICARE

## 2024-09-11 DIAGNOSIS — Z98.890 OTHER SPECIFIED POSTPROCEDURAL STATES: Chronic | ICD-10-CM

## 2024-09-11 DIAGNOSIS — R19.4 CHANGE IN BOWEL HABIT: ICD-10-CM

## 2024-09-11 PROCEDURE — 74177 CT ABD & PELVIS W/CONTRAST: CPT | Mod: 26

## 2024-09-11 PROCEDURE — 74177 CT ABD & PELVIS W/CONTRAST: CPT

## 2024-09-12 ENCOUNTER — NON-APPOINTMENT (OUTPATIENT)
Age: 64
End: 2024-09-12

## 2024-09-17 ENCOUNTER — APPOINTMENT (OUTPATIENT)
Dept: GASTROENTEROLOGY | Facility: CLINIC | Age: 64
End: 2024-09-17
Payer: MEDICARE

## 2024-09-17 VITALS
HEIGHT: 62 IN | WEIGHT: 162 LBS | OXYGEN SATURATION: 97 % | SYSTOLIC BLOOD PRESSURE: 139 MMHG | BODY MASS INDEX: 29.81 KG/M2 | DIASTOLIC BLOOD PRESSURE: 81 MMHG | HEART RATE: 91 BPM

## 2024-09-17 DIAGNOSIS — Z12.12 ENCOUNTER FOR SCREENING FOR MALIGNANT NEOPLASM OF COLON: ICD-10-CM

## 2024-09-17 DIAGNOSIS — J45.40 MODERATE PERSISTENT ASTHMA, UNCOMPLICATED: ICD-10-CM

## 2024-09-17 DIAGNOSIS — N40.1 BENIGN PROSTATIC HYPERPLASIA WITH LOWER URINARY TRACT SYMPMS: ICD-10-CM

## 2024-09-17 DIAGNOSIS — N13.8 BENIGN PROSTATIC HYPERPLASIA WITH LOWER URINARY TRACT SYMPMS: ICD-10-CM

## 2024-09-17 DIAGNOSIS — C49.A0 GASTROINTESTINAL STOMACL TUMOR,UNSPECIFIED SITE: ICD-10-CM

## 2024-09-17 DIAGNOSIS — R10.30 LOWER ABDOMINAL PAIN, UNSPECIFIED: ICD-10-CM

## 2024-09-17 DIAGNOSIS — K56.1 INTUSSUSCEPTION: ICD-10-CM

## 2024-09-17 DIAGNOSIS — Z12.11 ENCOUNTER FOR SCREENING FOR MALIGNANT NEOPLASM OF COLON: ICD-10-CM

## 2024-09-17 DIAGNOSIS — K31.89 OTHER DISEASES OF STOMACH AND DUODENUM: ICD-10-CM

## 2024-09-17 DIAGNOSIS — Z82.49 FAMILY HISTORY OF ISCHEMIC HEART DISEASE AND OTHER DISEASES OF THE CIRCULATORY SYSTEM: ICD-10-CM

## 2024-09-17 DIAGNOSIS — I86.1 SCROTAL VARICES: ICD-10-CM

## 2024-09-17 DIAGNOSIS — S39.011A STRAIN OF MUSCLE, FASCIA AND TENDON OF ABDOMEN, INITIAL ENCOUNTER: ICD-10-CM

## 2024-09-17 DIAGNOSIS — Z83.79 FAMILY HISTORY OF OTHER DISEASES OF THE DIGESTIVE SYSTEM: ICD-10-CM

## 2024-09-17 PROCEDURE — 99204 OFFICE O/P NEW MOD 45 MIN: CPT

## 2024-09-17 RX ORDER — POLYETHYLENE GLYCOL-3350, SODIUM CHLORIDE, POTASSIUM CHLORIDE AND SODIUM BICARBONATE 420; 11.2; 5.72; 1.48 G/438.4G; G/438.4G; G/438.4G; G/438.4G
420 POWDER, FOR SOLUTION ORAL
Qty: 1 | Refills: 0 | Status: ACTIVE | COMMUNITY
Start: 2024-09-17 | End: 1900-01-01

## 2024-09-18 PROBLEM — Z82.49 FAMILY HISTORY OF CARDIAC PACEMAKER: Status: ACTIVE | Noted: 2017-05-12

## 2024-09-18 NOTE — HISTORY OF PRESENT ILLNESS
[FreeTextEntry1] : 64-year-old white male , non-English-speaking.  Newly NORMA torres serves as .  Patient declines use of language line solutions. 54-year-old male with asthma BPH chronic dysuria hyperlipidemia last seen here by me in 5/14/2024.  Subsequently diagnosed at [facility] with large fungating T3a tumor.  Biopsies nondiagnostic.  Eventually taken to the OR for duodenal resection and duodenal jejunostomy by Dr. Crum of surgical oncology.  Pathology revealed that tumor was a 3.5 cm just/spindle cell tumor.  Low mitotic index.  T2 N0 M0.  Subsequently felt not to require any additional immunotherapy with a low mitotic index. Still having bouts of epigastric abdominal pain occurring sporadically with some decrease in appetite constipation.  A CT enterography was performed 9/12/2024 with finding of fatty liver BPH remote TURP ileocolic intussusception and fat-containing LIH. Recall that: Colonoscopy was performed 9/23 by Dr. Barker; 2 hyperplastic polyps were removed from the rectum.  A 5-year polyp surveillance colonoscopy was planned. Repeat colonoscopy was requested by oncology. Patient reports moving his bowels about 2 times per week with the help of MiraLAX.  He still has mid abdominal pain at the gastric location without rectal bleeding or weight loss.

## 2024-09-18 NOTE — ASSESSMENT
[FreeTextEntry1] : 64-year-old male recovering from intestinal surgery done 3 months ago for removal of benign enlarged chest.  No immunotherapy or chemotherapy planned.  Still having epigastric abdominal pain and nondescript symptoms of anorexia and constipation.  Encouraged to use of MiraLAX.  Encouraged high-fiber diet.  Encourage adequate hydration. Reasonable candidate for colonoscopy to investigate intussusception found on recent imaging.  The procedure, its risk benefits and prep, were explained to the patient, who understands and is agreeable to proceed.  Gavel light prep to be employed.  Repeat blood work not necessary.  No clearances required. ASA #3.  Mallampati #2.  Patient appears to be in optimal medical condition to undergo planned procedure.  Arrangements made.  Results to follow.

## 2024-09-18 NOTE — REASON FOR VISIT
[Post-op/Procedure: _________] : a [unfilled] post-op/procedure visit [FreeTextEntry1] : Duodenal resection of a large GIST in  D3.  Abnormal CAT scan

## 2024-09-18 NOTE — PHYSICAL EXAM

## 2024-09-19 ENCOUNTER — APPOINTMENT (OUTPATIENT)
Dept: SURGICAL ONCOLOGY | Facility: CLINIC | Age: 64
End: 2024-09-19
Payer: MEDICARE

## 2024-09-19 VITALS
WEIGHT: 161.5 LBS | SYSTOLIC BLOOD PRESSURE: 135 MMHG | BODY MASS INDEX: 29.72 KG/M2 | DIASTOLIC BLOOD PRESSURE: 88 MMHG | HEART RATE: 71 BPM | TEMPERATURE: 97.6 F | HEIGHT: 62 IN | OXYGEN SATURATION: 98 %

## 2024-09-19 PROCEDURE — 99214 OFFICE O/P EST MOD 30 MIN: CPT

## 2024-09-19 PROCEDURE — G2211 COMPLEX E/M VISIT ADD ON: CPT

## 2024-09-19 NOTE — HISTORY OF PRESENT ILLNESS
[de-identified] : Mr. Olivier is a 65 y/o male presenting with a gastrointestinal stromal tumor of the duodenum. S/P open partial duodenectomy with side-to-side duodenal-jejunal anastomosis, and a transversus abdominis plane block on 06/07/2024. Referred by Dr. Lacy.   On 04/15/2024, reported hospital admission for C/C of worsening abdominal pain with symptoms of diarrhea and chills, on-going for about 6 months. During admission, CT demonstrated an enhancing lesion arising from the distal 3rd of the duodenum. Patient underwent an endoscopy with Dr. Lacy, where the lesion was biopsied. Biopsy pathology was indeterminate, however differential supported GIST. Referred to surgical oncology, where we decided to pursue surgical treatment of symptomatic GIST after negative PET Dotetate. He is s/p partial duodenectomy with a duodenojejunostomy and a transversus abdominis plane block on 06/27/2024.  PMHx of anxiety, asthma, BPH, cognitive impairment, HLD (mixed), STD, and is presenting for follow up for a duodenal mass diagnostic for GIST.   Previously presented for an acute visit for a CC of epigastric sharp abdominal pain (5/10) that occurs sporadically for the last 15 days. His recent CT scan showed no evidence of obstruction nor evidence of recurrence. His appetite had decreased, +bowel movements with needed assistance of MiraLAX, and flatulence daily.   09/19/24: Patient presents for follow up. CT enterrography showed a patent anastomosis, with possible ileocolic intussusception. Recommend colonoscopy. He is tolerating a diet an having bowel function.

## 2024-09-19 NOTE — ASSESSMENT
[FreeTextEntry1] : Mr. Olivier is a 65 y/o male presenting with a GIST of the duodenum. Path reviewed, 3.5cm in size, duodenal, low mitotic rate. s/p open partial duodenectomy with side-to-side duodenal-jejunal anastomosis on 06/07/24.  Clinically, the physical examination is negative for post-operative infection. There is no evidence of dehiscence, pus, or failure of wound closure. Reviewed patient's symptoms and concerns. Prior to today's visit, the patient obtained CT enterography which revealed ileocolic intussception which may be transient. No bowel obstruction identified on imaging.  he is otherwise tolerating a diet and his DJ anastomosis looks patent and healthy.  I also educated on both the patient and spouse to seek immediate ED care should any concerning obstructive symptoms were to occur such as: worsening abdominal pain, - BM, or flatulence, +N/V. Pt and spouse understood the plan and agreed.   Plan: 1) Planning for colonoscopy with Dr. Cox on 10/7 2) F/U in 3 months  Rosalino Crum MD   Assistant Professor of Surgery [place] [employer] [facility] [employer] at [facility] Division of Surgical Oncology [facility] Phone: [phone] Fax: [phone]   Today, I personally spent 37  minutes in total time including reviewing imaging and studies, discussing complex treatment regimens, direct face to face time with the patient, patient education, answering patient questions and counseling, excluding separately billable procedures and billing time.

## 2024-09-19 NOTE — PHYSICAL EXAM
[Normal] : no peripheral adenopathy appreciated [de-identified] : RRR [de-identified] : Easy WOB [de-identified] : mild tenderness to palpation at epigastric region, no erythema, dehiscence, pus, or infection. No hernia on examination. Well Healed midline incision  [de-identified] : Well healed midline incision

## 2024-09-19 NOTE — RESULTS/DATA
[FreeTextEntry1] : PROCEDURE DATE:  09/11/2024 INTERPRETATION:  CLINICAL INFORMATION: 64-year-old man with change in bowel habits. History GI ST of the duodenum status post open partial duodenal resection with duodenal jejunal anastomosis. Recent onset episodes of epigastric pain sporadically for 2 weeks COMPARISON: CT scan 08/14/2024 CONTRAST/COMPLICATIONS: IV Contrast: Omnipaque 350  90 cc administered   10 cc discarded Oral Contrast: NONE Complications: None reported at time of study completion PROCEDURE: CT of the Abdomen and Pelvis (CT Enterography) was performed with intravenous contrast in the late arterial and portal venous phases. Sagittal and coronal reformats were performed. FINDINGS: LOWER CHEST: Within normal limits. LIVER: Steatosis. BILE DUCTS: Normal caliber. GALLBLADDER: Within normal limits. SPLEEN: Within normal limits. PANCREAS: Within normal limits. ADRENALS: Within normal limits. KIDNEYS/URETERS: Right parapelvic cyst. Bilateral subcentimeter hypodensities. BLADDER: Within normal limits. REPRODUCTIVE ORGANS: Enlarged prostate. Status post TURP BOWEL: No bowel obstruction. Appendix generalized. Ileocolic intussusception new since 08/14/2024. Suggest follow-up colonoscopy. Status post partial duodenal resection and duodenojejunostomy PERITONEUM/RETROPERITONEUM: Within normal limits. VESSELS: Within normal limits. LYMPH NODES: No lymphadenopathy. ABDOMINAL WALL: Fat-containing left inguinal hernia. BONES: Within normal limits.  IMPRESSION: Ileocolic intussusception which may be transient. Suggest follow-up colonoscopy. No site of focal bowel obstruction.  --- End of Report ---

## 2024-09-19 NOTE — REVIEW OF SYSTEMS
[Patient Intake Form Reviewed] : Patient intake form was reviewed [Negative] : Psychiatric [FreeTextEntry8] : see hpi [de-identified] : see hpi

## 2024-10-04 ENCOUNTER — APPOINTMENT (OUTPATIENT)
Dept: RADIOLOGY | Facility: CLINIC | Age: 64
End: 2024-10-04
Payer: MEDICARE

## 2024-10-04 ENCOUNTER — OUTPATIENT (OUTPATIENT)
Dept: OUTPATIENT SERVICES | Facility: HOSPITAL | Age: 64
LOS: 1 days | End: 2024-10-04
Payer: COMMERCIAL

## 2024-10-04 ENCOUNTER — APPOINTMENT (OUTPATIENT)
Dept: INTERNAL MEDICINE | Facility: CLINIC | Age: 64
End: 2024-10-04
Payer: MEDICARE

## 2024-10-04 VITALS — TEMPERATURE: 98.2 F | BODY MASS INDEX: 30 KG/M2 | HEIGHT: 62 IN | WEIGHT: 163 LBS

## 2024-10-04 VITALS — RESPIRATION RATE: 16 BRPM | SYSTOLIC BLOOD PRESSURE: 130 MMHG | HEART RATE: 72 BPM | DIASTOLIC BLOOD PRESSURE: 80 MMHG

## 2024-10-04 DIAGNOSIS — R05.9 COUGH, UNSPECIFIED: ICD-10-CM

## 2024-10-04 DIAGNOSIS — Z98.890 OTHER SPECIFIED POSTPROCEDURAL STATES: Chronic | ICD-10-CM

## 2024-10-04 DIAGNOSIS — J45.40 MODERATE PERSISTENT ASTHMA, UNCOMPLICATED: ICD-10-CM

## 2024-10-04 PROCEDURE — G2211 COMPLEX E/M VISIT ADD ON: CPT

## 2024-10-04 PROCEDURE — 71046 X-RAY EXAM CHEST 2 VIEWS: CPT | Mod: 26

## 2024-10-04 PROCEDURE — 71046 X-RAY EXAM CHEST 2 VIEWS: CPT

## 2024-10-04 PROCEDURE — 99214 OFFICE O/P EST MOD 30 MIN: CPT

## 2024-10-04 RX ORDER — AZITHROMYCIN 250 MG/1
250 TABLET, FILM COATED ORAL
Qty: 1 | Refills: 0 | Status: ACTIVE | COMMUNITY
Start: 2024-10-04 | End: 1900-01-01

## 2024-10-04 NOTE — ASSESSMENT
[FreeTextEntry1] : cough obtain x ray zpack intussception going for colonsocopy on Monday to confirm and treat Stromal tumor observation for now

## 2024-10-04 NOTE — HISTORY OF PRESENT ILLNESS
[FreeTextEntry1] : asthma [de-identified] : asthma intermittent cough for 2 weeks alternating white and yellow phlegm has asthma using his inhalers states cough is present but has not coughed the entire visit he is with his wife colonsocpy planned following intussception possilbe finding on ct enterography motion had small bowel surgery  for possible gist but Dotatate was neg

## 2024-10-07 ENCOUNTER — TRANSCRIPTION ENCOUNTER (OUTPATIENT)
Age: 64
End: 2024-10-07

## 2024-10-07 ENCOUNTER — OUTPATIENT (OUTPATIENT)
Dept: OUTPATIENT SERVICES | Facility: HOSPITAL | Age: 64
LOS: 1 days | End: 2024-10-07
Payer: MEDICARE

## 2024-10-07 ENCOUNTER — APPOINTMENT (OUTPATIENT)
Dept: GASTROENTEROLOGY | Facility: GI CENTER | Age: 64
End: 2024-10-07
Payer: MEDICARE

## 2024-10-07 DIAGNOSIS — K56.1 INTUSSUSCEPTION: ICD-10-CM

## 2024-10-07 DIAGNOSIS — Z98.890 OTHER SPECIFIED POSTPROCEDURAL STATES: Chronic | ICD-10-CM

## 2024-10-07 PROCEDURE — 45378 DIAGNOSTIC COLONOSCOPY: CPT

## 2024-10-13 ENCOUNTER — OUTPATIENT (OUTPATIENT)
Dept: OUTPATIENT SERVICES | Facility: HOSPITAL | Age: 64
LOS: 1 days | Discharge: HOME | End: 2024-10-13

## 2024-10-13 DIAGNOSIS — D64.9 ANEMIA, UNSPECIFIED: ICD-10-CM

## 2024-10-13 DIAGNOSIS — Z98.890 OTHER SPECIFIED POSTPROCEDURAL STATES: Chronic | ICD-10-CM

## 2024-10-17 ENCOUNTER — APPOINTMENT (OUTPATIENT)
Dept: HEMATOLOGY ONCOLOGY | Facility: CLINIC | Age: 64
End: 2024-10-17
Payer: MEDICARE

## 2024-10-17 ENCOUNTER — RESULT REVIEW (OUTPATIENT)
Age: 64
End: 2024-10-17

## 2024-10-17 VITALS
DIASTOLIC BLOOD PRESSURE: 84 MMHG | BODY MASS INDEX: 30.02 KG/M2 | HEIGHT: 62 IN | SYSTOLIC BLOOD PRESSURE: 142 MMHG | OXYGEN SATURATION: 98 % | HEART RATE: 80 BPM | WEIGHT: 163.13 LBS | TEMPERATURE: 97.3 F

## 2024-10-17 DIAGNOSIS — C49.A0 GASTROINTESTINAL STOMACL TUMOR,UNSPECIFIED SITE: ICD-10-CM

## 2024-10-17 LAB
ALBUMIN SERPL ELPH-MCNC: 4.4 G/DL
ALP BLD-CCNC: 112 U/L
ALT SERPL-CCNC: 25 U/L
ANION GAP SERPL CALC-SCNC: 12 MMOL/L
AST SERPL-CCNC: 15 U/L
BASOPHILS # BLD AUTO: 0.1 K/UL — SIGNIFICANT CHANGE UP (ref 0–0.2)
BASOPHILS NFR BLD AUTO: 1 % — SIGNIFICANT CHANGE UP (ref 0–2)
BILIRUB SERPL-MCNC: 0.2 MG/DL
BUN SERPL-MCNC: 12 MG/DL
CALCIUM SERPL-MCNC: 9.5 MG/DL
CHLORIDE SERPL-SCNC: 105 MMOL/L
CO2 SERPL-SCNC: 25 MMOL/L
CREAT SERPL-MCNC: 0.9 MG/DL
EGFR: 95 ML/MIN/1.73M2
EOSINOPHIL # BLD AUTO: 0.2 K/UL — SIGNIFICANT CHANGE UP (ref 0–0.5)
EOSINOPHIL NFR BLD AUTO: 4.4 % — SIGNIFICANT CHANGE UP (ref 0–6)
GLUCOSE SERPL-MCNC: 80 MG/DL
HCT VFR BLD CALC: 47.1 % — SIGNIFICANT CHANGE UP (ref 39–50)
HGB BLD-MCNC: 14.7 G/DL — SIGNIFICANT CHANGE UP (ref 13–17)
LYMPHOCYTES # BLD AUTO: 2.4 K/UL — SIGNIFICANT CHANGE UP (ref 1–3.3)
LYMPHOCYTES # BLD AUTO: 46.4 % — HIGH (ref 13–44)
MAGNESIUM SERPL-MCNC: 2.3 MG/DL
MCHC RBC-ENTMCNC: 29 PG — SIGNIFICANT CHANGE UP (ref 27–34)
MCHC RBC-ENTMCNC: 31.3 G/DL — LOW (ref 32–36)
MCV RBC AUTO: 92.6 FL — SIGNIFICANT CHANGE UP (ref 80–100)
MONOCYTES # BLD AUTO: 0.6 K/UL — SIGNIFICANT CHANGE UP (ref 0–0.9)
MONOCYTES NFR BLD AUTO: 10.9 % — SIGNIFICANT CHANGE UP (ref 2–14)
NEUTROPHILS # BLD AUTO: 1.9 K/UL — SIGNIFICANT CHANGE UP (ref 1.8–7.4)
NEUTROPHILS NFR BLD AUTO: 37.2 % — LOW (ref 43–77)
PLATELET # BLD AUTO: 247 K/UL — SIGNIFICANT CHANGE UP (ref 150–400)
POTASSIUM SERPL-SCNC: 4.9 MMOL/L
PROT SERPL-MCNC: 7.5 G/DL
RBC # BLD: 5.09 M/UL — SIGNIFICANT CHANGE UP (ref 4.2–5.8)
RBC # FLD: 13.7 % — SIGNIFICANT CHANGE UP (ref 10.3–14.5)
SODIUM SERPL-SCNC: 142 MMOL/L
WBC # BLD: 5.2 K/UL — SIGNIFICANT CHANGE UP (ref 3.8–10.5)
WBC # FLD AUTO: 5.2 K/UL — SIGNIFICANT CHANGE UP (ref 3.8–10.5)

## 2024-10-17 PROCEDURE — 99214 OFFICE O/P EST MOD 30 MIN: CPT

## 2024-10-31 NOTE — ASSESSMENT
[FreeTextEntry1] : urine culture negative\par fs ok and a1c ok was worried about dm\par patient agrees to TURP procedure\par medically stable for procedure\par urine culture before surgery.\par currently not able to work Detail Level: Detailed Depth Of Biopsy: dermis Was A Bandage Applied: Yes Size Of Lesion In Cm: 1 X Size Of Lesion In Cm: 0 Biopsy Type: H and E Biopsy Method: Dermablade Anesthesia Type: 1% lidocaine with epinephrine Anesthesia Volume In Cc: 0.5 Hemostasis: Drysol Wound Care: Petrolatum Dressing: bandage Destruction After The Procedure: No Type Of Destruction Used: Electrodesiccation and Curettage Curettage Text: The wound bed was treated with curettage after the biopsy was performed. Cryotherapy Text: The wound bed was treated with cryotherapy after the biopsy was performed. Electrodesiccation Text: The wound bed was treated with electrodesiccation after the biopsy was performed. Electrodesiccation And Curettage Text: The wound bed was treated with electrodesiccation and curettage after the biopsy was performed. Treated x 3 Silver Nitrate Text: The wound bed was treated with silver nitrate after the biopsy was performed. Lab: [id number] Lab Facility:  Consent: Written consent was obtained and risks were reviewed including but not limited to scarring, infection, bleeding, scabbing, incomplete removal, nerve damage and allergy to anesthesia. Post-Care Instructions: I reviewed with the patient in detail post-care instructions. Patient is to keep the biopsy site dry overnight, and then apply bacitracin twice daily until healed. Patient may apply hydrogen peroxide soaks to remove any crusting. Notification Instructions: Patient will be notified of biopsy results. However, patient instructed to call the office if not contacted within 2 weeks. Billing Type: Third-Party Bill Information: Selecting Yes will display possible errors in your note based on the variables you have selected. This validation is only offered as a suggestion for you. PLEASE NOTE THAT THE VALIDATION TEXT WILL BE REMOVED WHEN YOU FINALIZE YOUR NOTE. IF YOU WANT TO FAX A PRELIMINARY NOTE YOU WILL NEED TO TOGGLE THIS TO 'NO' IF YOU DO NOT WANT IT IN YOUR FAXED NOTE. Size Of Lesion In Cm: 0.7

## 2024-12-19 ENCOUNTER — APPOINTMENT (OUTPATIENT)
Dept: SURGICAL ONCOLOGY | Facility: CLINIC | Age: 64
End: 2024-12-19
Payer: MEDICARE

## 2024-12-19 ENCOUNTER — RX RENEWAL (OUTPATIENT)
Age: 64
End: 2024-12-19

## 2024-12-19 VITALS
BODY MASS INDEX: 29.98 KG/M2 | HEIGHT: 62 IN | SYSTOLIC BLOOD PRESSURE: 131 MMHG | DIASTOLIC BLOOD PRESSURE: 87 MMHG | WEIGHT: 162.92 LBS | TEMPERATURE: 97.3 F | HEART RATE: 76 BPM | OXYGEN SATURATION: 98 %

## 2024-12-19 PROCEDURE — 99214 OFFICE O/P EST MOD 30 MIN: CPT

## 2024-12-19 PROCEDURE — G2211 COMPLEX E/M VISIT ADD ON: CPT

## 2024-12-19 NOTE — ED STATDOCS - NS_ATTENDINGSCRIBE_ED_ALL_ED
Diagnosed with UTI per urgent care visit 12/11 -- has been on Bactrim  Urine culture 12/11/24 grew ESBL E coli   Repeat Urine culture 12/17/24 showed no growth   Abx changed to IV Meropenem  ID consutled and recommended IV Meropenem x 3 weeks    I personally performed the service described in the documentation recorded by the scribe in my presence, and it accurately and completely records my words and actions.

## 2025-01-29 ENCOUNTER — OUTPATIENT (OUTPATIENT)
Dept: OUTPATIENT SERVICES | Facility: HOSPITAL | Age: 65
LOS: 1 days | Discharge: HOME | End: 2025-01-29

## 2025-01-29 DIAGNOSIS — D64.9 ANEMIA, UNSPECIFIED: ICD-10-CM

## 2025-01-29 DIAGNOSIS — Z98.890 OTHER SPECIFIED POSTPROCEDURAL STATES: Chronic | ICD-10-CM

## 2025-02-05 ENCOUNTER — APPOINTMENT (OUTPATIENT)
Dept: CT IMAGING | Facility: CLINIC | Age: 65
End: 2025-02-05

## 2025-02-06 ENCOUNTER — OUTPATIENT (OUTPATIENT)
Dept: OUTPATIENT SERVICES | Facility: HOSPITAL | Age: 65
LOS: 1 days | End: 2025-02-06
Payer: COMMERCIAL

## 2025-02-06 ENCOUNTER — RESULT REVIEW (OUTPATIENT)
Age: 65
End: 2025-02-06

## 2025-02-06 ENCOUNTER — APPOINTMENT (OUTPATIENT)
Dept: CT IMAGING | Facility: CLINIC | Age: 65
End: 2025-02-06
Payer: MEDICARE

## 2025-02-06 ENCOUNTER — APPOINTMENT (OUTPATIENT)
Dept: HEMATOLOGY ONCOLOGY | Facility: CLINIC | Age: 65
End: 2025-02-06
Payer: MEDICARE

## 2025-02-06 VITALS
OXYGEN SATURATION: 96 % | DIASTOLIC BLOOD PRESSURE: 78 MMHG | TEMPERATURE: 97.8 F | BODY MASS INDEX: 30.36 KG/M2 | HEIGHT: 62 IN | WEIGHT: 165 LBS | HEART RATE: 83 BPM | SYSTOLIC BLOOD PRESSURE: 118 MMHG

## 2025-02-06 DIAGNOSIS — C49.A0 GASTROINTESTINAL STROMAL TUMOR, UNSPECIFIED SITE: ICD-10-CM

## 2025-02-06 LAB
ALBUMIN SERPL ELPH-MCNC: 4.3 G/DL
ALP BLD-CCNC: 105 U/L
ALT SERPL-CCNC: 35 U/L
ANION GAP SERPL CALC-SCNC: 12 MMOL/L
AST SERPL-CCNC: 26 U/L
BASOPHILS # BLD AUTO: 0.1 K/UL — SIGNIFICANT CHANGE UP (ref 0–0.2)
BASOPHILS NFR BLD AUTO: 1.1 % — SIGNIFICANT CHANGE UP (ref 0–2)
BILIRUB SERPL-MCNC: 0.4 MG/DL
BUN SERPL-MCNC: 12 MG/DL
CALCIUM SERPL-MCNC: 9.5 MG/DL
CHLORIDE SERPL-SCNC: 107 MMOL/L
CO2 SERPL-SCNC: 23 MMOL/L
CREAT SERPL-MCNC: 0.94 MG/DL
EGFR: 90 ML/MIN/1.73M2
EOSINOPHIL # BLD AUTO: 0.2 K/UL — SIGNIFICANT CHANGE UP (ref 0–0.5)
EOSINOPHIL NFR BLD AUTO: 4 % — SIGNIFICANT CHANGE UP (ref 0–6)
GLUCOSE SERPL-MCNC: 112 MG/DL
HCT VFR BLD CALC: 45.5 % — SIGNIFICANT CHANGE UP (ref 39–50)
HGB BLD-MCNC: 14.4 G/DL — SIGNIFICANT CHANGE UP (ref 13–17)
LYMPHOCYTES # BLD AUTO: 2.4 K/UL — SIGNIFICANT CHANGE UP (ref 1–3.3)
LYMPHOCYTES # BLD AUTO: 49.9 % — HIGH (ref 13–44)
MAGNESIUM SERPL-MCNC: 2.2 MG/DL
MCHC RBC-ENTMCNC: 29.7 PG — SIGNIFICANT CHANGE UP (ref 27–34)
MCHC RBC-ENTMCNC: 31.7 G/DL — LOW (ref 32–36)
MCV RBC AUTO: 93.9 FL — SIGNIFICANT CHANGE UP (ref 80–100)
MONOCYTES # BLD AUTO: 0.5 K/UL — SIGNIFICANT CHANGE UP (ref 0–0.9)
MONOCYTES NFR BLD AUTO: 9.9 % — SIGNIFICANT CHANGE UP (ref 2–14)
NEUTROPHILS # BLD AUTO: 1.7 K/UL — LOW (ref 1.8–7.4)
NEUTROPHILS NFR BLD AUTO: 35.2 % — LOW (ref 43–77)
PLATELET # BLD AUTO: 208 K/UL — SIGNIFICANT CHANGE UP (ref 150–400)
POTASSIUM SERPL-SCNC: 4.8 MMOL/L
PROT SERPL-MCNC: 7.3 G/DL
RBC # BLD: 4.85 M/UL — SIGNIFICANT CHANGE UP (ref 4.2–5.8)
RBC # FLD: 13 % — SIGNIFICANT CHANGE UP (ref 10.3–14.5)
SODIUM SERPL-SCNC: 142 MMOL/L
WBC # BLD: 4.8 K/UL — SIGNIFICANT CHANGE UP (ref 3.8–10.5)
WBC # FLD AUTO: 4.8 K/UL — SIGNIFICANT CHANGE UP (ref 3.8–10.5)

## 2025-02-06 PROCEDURE — 71260 CT THORAX DX C+: CPT | Mod: 26

## 2025-02-06 PROCEDURE — 74177 CT ABD & PELVIS W/CONTRAST: CPT | Mod: 26

## 2025-02-06 PROCEDURE — 71260 CT THORAX DX C+: CPT

## 2025-02-06 PROCEDURE — 99213 OFFICE O/P EST LOW 20 MIN: CPT

## 2025-02-06 PROCEDURE — 74177 CT ABD & PELVIS W/CONTRAST: CPT

## 2025-03-20 ENCOUNTER — APPOINTMENT (OUTPATIENT)
Dept: SURGICAL ONCOLOGY | Facility: CLINIC | Age: 65
End: 2025-03-20
Payer: MEDICARE

## 2025-03-20 ENCOUNTER — RESULT REVIEW (OUTPATIENT)
Age: 65
End: 2025-03-20

## 2025-03-20 ENCOUNTER — APPOINTMENT (OUTPATIENT)
Dept: HEMATOLOGY ONCOLOGY | Facility: CLINIC | Age: 65
End: 2025-03-20
Payer: MEDICARE

## 2025-03-20 VITALS
HEIGHT: 62 IN | BODY MASS INDEX: 30.39 KG/M2 | HEART RATE: 76 BPM | WEIGHT: 165.12 LBS | DIASTOLIC BLOOD PRESSURE: 80 MMHG | OXYGEN SATURATION: 99 % | TEMPERATURE: 97.3 F | SYSTOLIC BLOOD PRESSURE: 136 MMHG

## 2025-03-20 LAB
ALBUMIN SERPL ELPH-MCNC: 4.4 G/DL
ALP BLD-CCNC: 107 U/L
ALT SERPL-CCNC: 89 U/L
ANION GAP SERPL CALC-SCNC: 10 MMOL/L
AST SERPL-CCNC: 96 U/L
BASOPHILS # BLD AUTO: 0.04 K/UL — SIGNIFICANT CHANGE UP (ref 0–0.2)
BASOPHILS NFR BLD AUTO: 0.9 % — SIGNIFICANT CHANGE UP (ref 0–2)
BILIRUB SERPL-MCNC: 0.3 MG/DL
BUN SERPL-MCNC: 13 MG/DL
CALCIUM SERPL-MCNC: 9.5 MG/DL
CHLORIDE SERPL-SCNC: 104 MMOL/L
CO2 SERPL-SCNC: 26 MMOL/L
CREAT SERPL-MCNC: 0.92 MG/DL
EGFRCR SERPLBLD CKD-EPI 2021: 92 ML/MIN/1.73M2
EOSINOPHIL # BLD AUTO: 0.21 K/UL — SIGNIFICANT CHANGE UP (ref 0–0.5)
EOSINOPHIL NFR BLD AUTO: 4.5 % — SIGNIFICANT CHANGE UP (ref 0–6)
GLUCOSE SERPL-MCNC: 107 MG/DL
HCT VFR BLD CALC: 44.7 % — SIGNIFICANT CHANGE UP (ref 39–50)
HGB BLD-MCNC: 14 G/DL — SIGNIFICANT CHANGE UP (ref 13–17)
IMM GRANULOCYTES # BLD AUTO: 0 K/UL — SIGNIFICANT CHANGE UP (ref 0–0.07)
IMM GRANULOCYTES NFR BLD AUTO: 0 % — SIGNIFICANT CHANGE UP (ref 0–0.9)
LYMPHOCYTES # BLD AUTO: 2.25 K/UL — SIGNIFICANT CHANGE UP (ref 1–3.3)
LYMPHOCYTES NFR BLD AUTO: 48.4 % — HIGH (ref 13–44)
MAGNESIUM SERPL-MCNC: 2.3 MG/DL
MCHC RBC-ENTMCNC: 29 PG — SIGNIFICANT CHANGE UP (ref 27–34)
MCHC RBC-ENTMCNC: 31.3 G/DL — LOW (ref 32–36)
MCV RBC AUTO: 92.5 FL — SIGNIFICANT CHANGE UP (ref 80–100)
MONOCYTES # BLD AUTO: 0.46 K/UL — SIGNIFICANT CHANGE UP (ref 0–0.9)
MONOCYTES NFR BLD AUTO: 9.9 % — SIGNIFICANT CHANGE UP (ref 2–14)
NEUTROPHILS # BLD AUTO: 1.69 K/UL — LOW (ref 1.8–7.4)
NEUTROPHILS NFR BLD AUTO: 36.3 % — LOW (ref 43–77)
NRBC # BLD AUTO: 0 K/UL — SIGNIFICANT CHANGE UP (ref 0–0)
NRBC # FLD: 0 K/UL — SIGNIFICANT CHANGE UP (ref 0–0)
NRBC BLD AUTO-RTO: 0 /100 WBCS — SIGNIFICANT CHANGE UP (ref 0–0)
PLATELET # BLD AUTO: 245 K/UL — SIGNIFICANT CHANGE UP (ref 150–400)
PMV BLD: 9.8 FL — SIGNIFICANT CHANGE UP (ref 7–13)
POTASSIUM SERPL-SCNC: 4.7 MMOL/L
PROT SERPL-MCNC: 7.5 G/DL
RBC # BLD: 4.83 M/UL — SIGNIFICANT CHANGE UP (ref 4.2–5.8)
RBC # FLD: 13.4 % — SIGNIFICANT CHANGE UP (ref 10.3–14.5)
SODIUM SERPL-SCNC: 140 MMOL/L
WBC # BLD: 4.65 K/UL — SIGNIFICANT CHANGE UP (ref 3.8–10.5)
WBC # FLD AUTO: 4.65 K/UL — SIGNIFICANT CHANGE UP (ref 3.8–10.5)

## 2025-03-20 PROCEDURE — 99214 OFFICE O/P EST MOD 30 MIN: CPT

## 2025-03-20 PROCEDURE — G2211 COMPLEX E/M VISIT ADD ON: CPT

## 2025-05-01 ENCOUNTER — OFFICE (OUTPATIENT)
Dept: URBAN - METROPOLITAN AREA CLINIC 12 | Facility: CLINIC | Age: 65
Setting detail: OPHTHALMOLOGY
End: 2025-05-01
Payer: COMMERCIAL

## 2025-05-01 DIAGNOSIS — H02.014: ICD-10-CM

## 2025-05-01 DIAGNOSIS — H16.223: ICD-10-CM

## 2025-05-01 DIAGNOSIS — H25.13: ICD-10-CM

## 2025-05-01 PROCEDURE — 92002 INTRM OPH EXAM NEW PATIENT: CPT | Performed by: STUDENT IN AN ORGANIZED HEALTH CARE EDUCATION/TRAINING PROGRAM

## 2025-05-01 ASSESSMENT — LID EXAM ASSESSMENTS: OS_TRICHIASIS: LUL

## 2025-05-01 ASSESSMENT — CORNEAL PTERYGIUM: OD_PTERYGIUM: NASAL 1MM

## 2025-05-01 ASSESSMENT — VISUAL ACUITY
OD_BCVA: 20/60-1
OS_BCVA: 20/80

## 2025-05-01 ASSESSMENT — CONFRONTATIONAL VISUAL FIELD TEST (CVF)
OS_FINDINGS: FULL
OD_FINDINGS: FULL

## 2025-06-09 ENCOUNTER — RX RENEWAL (OUTPATIENT)
Age: 65
End: 2025-06-09

## 2025-06-12 ENCOUNTER — OUTPATIENT (OUTPATIENT)
Dept: OUTPATIENT SERVICES | Facility: HOSPITAL | Age: 65
LOS: 1 days | End: 2025-06-12
Payer: COMMERCIAL

## 2025-06-12 ENCOUNTER — APPOINTMENT (OUTPATIENT)
Dept: CT IMAGING | Facility: CLINIC | Age: 65
End: 2025-06-12

## 2025-06-12 DIAGNOSIS — K31.89 OTHER DISEASES OF STOMACH AND DUODENUM: ICD-10-CM

## 2025-06-12 DIAGNOSIS — Z98.890 OTHER SPECIFIED POSTPROCEDURAL STATES: Chronic | ICD-10-CM

## 2025-06-12 DIAGNOSIS — Z98.890 OTHER SPECIFIED POSTPROCEDURAL STATES: ICD-10-CM

## 2025-06-12 PROCEDURE — 71260 CT THORAX DX C+: CPT | Mod: 26

## 2025-06-12 PROCEDURE — 74160 CT ABDOMEN W/CONTRAST: CPT

## 2025-06-12 PROCEDURE — 74160 CT ABDOMEN W/CONTRAST: CPT | Mod: 26

## 2025-06-12 PROCEDURE — 71260 CT THORAX DX C+: CPT

## 2025-06-26 ENCOUNTER — APPOINTMENT (OUTPATIENT)
Dept: SURGICAL ONCOLOGY | Facility: CLINIC | Age: 65
End: 2025-06-26

## 2025-07-17 ENCOUNTER — OUTPATIENT (OUTPATIENT)
Dept: OUTPATIENT SERVICES | Facility: HOSPITAL | Age: 65
LOS: 1 days | Discharge: HOME | End: 2025-07-17

## 2025-07-17 DIAGNOSIS — Z98.890 OTHER SPECIFIED POSTPROCEDURAL STATES: Chronic | ICD-10-CM

## 2025-07-17 DIAGNOSIS — D64.9 ANEMIA, UNSPECIFIED: ICD-10-CM

## 2025-07-22 ENCOUNTER — RESULT REVIEW (OUTPATIENT)
Age: 65
End: 2025-07-22

## 2025-07-22 ENCOUNTER — APPOINTMENT (OUTPATIENT)
Dept: HEMATOLOGY ONCOLOGY | Facility: CLINIC | Age: 65
End: 2025-07-22
Payer: COMMERCIAL

## 2025-07-22 VITALS
BODY MASS INDEX: 30.55 KG/M2 | HEIGHT: 62 IN | HEART RATE: 78 BPM | WEIGHT: 166.01 LBS | SYSTOLIC BLOOD PRESSURE: 131 MMHG | TEMPERATURE: 98 F | DIASTOLIC BLOOD PRESSURE: 77 MMHG | OXYGEN SATURATION: 98 %

## 2025-07-22 LAB
ALBUMIN SERPL ELPH-MCNC: 4.5 G/DL
ALP BLD-CCNC: 111 U/L
ALT SERPL-CCNC: 34 U/L
ANION GAP SERPL CALC-SCNC: 13 MMOL/L
AST SERPL-CCNC: 25 U/L
BASOPHILS # BLD AUTO: 0.03 K/UL — SIGNIFICANT CHANGE UP (ref 0–0.2)
BASOPHILS NFR BLD AUTO: 0.6 % — SIGNIFICANT CHANGE UP (ref 0–2)
BILIRUB SERPL-MCNC: 0.3 MG/DL
BUN SERPL-MCNC: 12 MG/DL
CALCIUM SERPL-MCNC: 9.8 MG/DL
CHLORIDE SERPL-SCNC: 104 MMOL/L
CO2 SERPL-SCNC: 22 MMOL/L
CREAT SERPL-MCNC: 0.92 MG/DL
EGFRCR SERPLBLD CKD-EPI 2021: 92 ML/MIN/1.73M2
EOSINOPHIL # BLD AUTO: 0.13 K/UL — SIGNIFICANT CHANGE UP (ref 0–0.5)
EOSINOPHIL NFR BLD AUTO: 2.5 % — SIGNIFICANT CHANGE UP (ref 0–6)
GLUCOSE SERPL-MCNC: 103 MG/DL
HCT VFR BLD CALC: 44.3 % — SIGNIFICANT CHANGE UP (ref 39–50)
HGB BLD-MCNC: 13.7 G/DL — SIGNIFICANT CHANGE UP (ref 13–17)
IMM GRANULOCYTES # BLD AUTO: 0.01 K/UL — SIGNIFICANT CHANGE UP (ref 0–0.07)
IMM GRANULOCYTES NFR BLD AUTO: 0.2 % — SIGNIFICANT CHANGE UP (ref 0–0.9)
LYMPHOCYTES # BLD AUTO: 2.05 K/UL — SIGNIFICANT CHANGE UP (ref 1–3.3)
LYMPHOCYTES NFR BLD AUTO: 40 % — SIGNIFICANT CHANGE UP (ref 13–44)
MCHC RBC-ENTMCNC: 28.7 PG — SIGNIFICANT CHANGE UP (ref 27–34)
MCHC RBC-ENTMCNC: 30.9 G/DL — LOW (ref 32–36)
MCV RBC AUTO: 92.9 FL — SIGNIFICANT CHANGE UP (ref 80–100)
MONOCYTES # BLD AUTO: 0.44 K/UL — SIGNIFICANT CHANGE UP (ref 0–0.9)
MONOCYTES NFR BLD AUTO: 8.6 % — SIGNIFICANT CHANGE UP (ref 2–14)
NEUTROPHILS # BLD AUTO: 2.47 K/UL — SIGNIFICANT CHANGE UP (ref 1.8–7.4)
NEUTROPHILS NFR BLD AUTO: 48.1 % — SIGNIFICANT CHANGE UP (ref 43–77)
NRBC # BLD AUTO: 0 K/UL — SIGNIFICANT CHANGE UP (ref 0–0)
NRBC # FLD: 0 K/UL — SIGNIFICANT CHANGE UP (ref 0–0)
NRBC BLD AUTO-RTO: 0 /100 WBCS — SIGNIFICANT CHANGE UP (ref 0–0)
PLATELET # BLD AUTO: 257 K/UL — SIGNIFICANT CHANGE UP (ref 150–400)
PMV BLD: 9.5 FL — SIGNIFICANT CHANGE UP (ref 7–13)
POTASSIUM SERPL-SCNC: 4.6 MMOL/L
PROT SERPL-MCNC: 7.7 G/DL
RBC # BLD: 4.77 M/UL — SIGNIFICANT CHANGE UP (ref 4.2–5.8)
RBC # FLD: 13.8 % — SIGNIFICANT CHANGE UP (ref 10.3–14.5)
SODIUM SERPL-SCNC: 139 MMOL/L
WBC # BLD: 5.13 K/UL — SIGNIFICANT CHANGE UP (ref 3.8–10.5)
WBC # FLD AUTO: 5.13 K/UL — SIGNIFICANT CHANGE UP (ref 3.8–10.5)

## 2025-07-22 PROCEDURE — 99214 OFFICE O/P EST MOD 30 MIN: CPT

## 2025-07-22 PROCEDURE — G2211 COMPLEX E/M VISIT ADD ON: CPT

## 2025-07-22 RX ORDER — PANTOPRAZOLE 20 MG/1
20 TABLET, DELAYED RELEASE ORAL DAILY
Qty: 90 | Refills: 3 | Status: ACTIVE | COMMUNITY
Start: 2025-07-22 | End: 1900-01-01

## 2025-07-29 ENCOUNTER — APPOINTMENT (OUTPATIENT)
Dept: SURGICAL ONCOLOGY | Facility: CLINIC | Age: 65
End: 2025-07-29
Payer: COMMERCIAL

## 2025-07-29 PROCEDURE — 99214 OFFICE O/P EST MOD 30 MIN: CPT

## 2025-07-29 PROCEDURE — G2211 COMPLEX E/M VISIT ADD ON: CPT

## 2025-09-03 ENCOUNTER — APPOINTMENT (OUTPATIENT)
Dept: INTERNAL MEDICINE | Facility: CLINIC | Age: 65
End: 2025-09-03
Payer: COMMERCIAL

## 2025-09-03 VITALS — HEIGHT: 62 IN | BODY MASS INDEX: 30 KG/M2 | WEIGHT: 163 LBS

## 2025-09-03 VITALS — DIASTOLIC BLOOD PRESSURE: 82 MMHG | HEART RATE: 82 BPM | RESPIRATION RATE: 12 BRPM | SYSTOLIC BLOOD PRESSURE: 126 MMHG

## 2025-09-03 DIAGNOSIS — C49.A0 GASTROINTESTINAL STOMACL TUMOR,UNSPECIFIED SITE: ICD-10-CM

## 2025-09-03 DIAGNOSIS — K59.00 CONSTIPATION, UNSPECIFIED: ICD-10-CM

## 2025-09-03 DIAGNOSIS — S39.011A STRAIN OF MUSCLE, FASCIA AND TENDON OF ABDOMEN, INITIAL ENCOUNTER: ICD-10-CM

## 2025-09-03 DIAGNOSIS — Z23 ENCOUNTER FOR IMMUNIZATION: ICD-10-CM

## 2025-09-03 DIAGNOSIS — J45.40 MODERATE PERSISTENT ASTHMA, UNCOMPLICATED: ICD-10-CM

## 2025-09-03 PROCEDURE — 99214 OFFICE O/P EST MOD 30 MIN: CPT | Mod: 25

## 2025-09-03 PROCEDURE — G0008: CPT

## 2025-09-03 PROCEDURE — 90656 IIV3 VACC NO PRSV 0.5 ML IM: CPT

## 2025-09-03 PROCEDURE — G2211 COMPLEX E/M VISIT ADD ON: CPT

## 2025-09-03 RX ORDER — METHYLPREDNISOLONE 4 MG/1
4 TABLET ORAL
Qty: 1 | Refills: 1 | Status: ACTIVE | COMMUNITY
Start: 2025-09-03 | End: 1900-01-01

## 2025-09-03 RX ORDER — LACTULOSE 10 G/15ML
10 SOLUTION ORAL; RECTAL
Qty: 1 | Refills: 0 | Status: ACTIVE | COMMUNITY
Start: 2025-09-03 | End: 1900-01-01

## (undated) DEVICE — SUCTION YANKAUER NO CONTROL VENT

## (undated) DEVICE — ELCTR BOVIE PENCIL SMOKE EVACUATION

## (undated) DEVICE — SUT ETHILON 2-0 18" PS-2

## (undated) DEVICE — SUT SILK 4-0 30" TIES

## (undated) DEVICE — SPONGE DISSECTOR PEANUT

## (undated) DEVICE — LIGASURE IMPACT

## (undated) DEVICE — APPLICATOR FOR TISSEEL DUPLOTIP W SNAP LOCK 5MMX32CM

## (undated) DEVICE — ELCTR LAPROSCOPIC FLEXIBLE ARGON COAG ONLY 28CM

## (undated) DEVICE — ELCTR BOVIE TIP BLADE INSULATED 6.5" EDGE

## (undated) DEVICE — TUBING IV EXTENSION MACRO W CLAVE 7"

## (undated) DEVICE — DRSG TAPE UMBILICAL COTTON 2" X 30 X 1/8"

## (undated) DEVICE — FOLEY TRAY 16FR 5CC LTX URINE METER TEMP SENSING CLOSED

## (undated) DEVICE — DRAPE INSTRUMENT POUCH 6.75" X 11"

## (undated) DEVICE — STAPLER SKIN PROXIMATE

## (undated) DEVICE — SUCTION TUBE CARDIAC SOFT TIP 6FR SHAFT 10FR TIP 6"

## (undated) DEVICE — SUT SILK 0 30" TIES

## (undated) DEVICE — SUT PDS II 1 48" TP-1

## (undated) DEVICE — SYR LUER SLIP TIP 50CC

## (undated) DEVICE — DRAPE TOWEL BLUE 17" X 24"

## (undated) DEVICE — GOWN XL

## (undated) DEVICE — VENODYNE/SCD SLEEVE CALF MEDIUM

## (undated) DEVICE — GLV 7.5 PROTEXIS (WHITE)

## (undated) DEVICE — PACK IV START WITH CHG

## (undated) DEVICE — SUT VICRYL 3-0 27" SH UNDYED

## (undated) DEVICE — SOL IRR BAG H2O 1000ML

## (undated) DEVICE — FEEDING TUBE NG KANGAROO POLYURETHANE 5FR 51CM ENFIT

## (undated) DEVICE — SOL IRR BAG NS 0.9% 1000ML

## (undated) DEVICE — VESSEL LOOP MAXI-YELLOW  0.120" X 16"

## (undated) DEVICE — FORCEP RADIAL JAW 4 W NDL 2.4MM 2.8MM 240CM ORANGE DISP

## (undated) DEVICE — Device

## (undated) DEVICE — PACK MAJOR ABDOMINAL WITH LAP

## (undated) DEVICE — DENTURE CUP PINK

## (undated) DEVICE — SUT BOOT STANDARD (ASSORTED) 5 PAIR

## (undated) DEVICE — DRSG 2X2

## (undated) DEVICE — MARKER ENDO SPOT EX

## (undated) DEVICE — SYR SLIP 10CC

## (undated) DEVICE — ELCTR ROCKER SWITCH PENCIL BLUE 10FT

## (undated) DEVICE — DRAPE XL SHEET 77X98"

## (undated) DEVICE — GOWN IMPERV XL

## (undated) DEVICE — VESSEL LOOP MAXI-RED  0.120" X 16"

## (undated) DEVICE — KIT DEFENDO 4 OLY 4 PC

## (undated) DEVICE — SOL BAG NS 0.9% 1000ML

## (undated) DEVICE — DRAPE TOWEL BLUE STICKY

## (undated) DEVICE — APPLICATOR FOR TISSEEL GASLESS SPRAY TIP

## (undated) DEVICE — SUT SILK 3-0 18" SH (POP-OFF)

## (undated) DEVICE — SUT PROLENE 4-0 36" RB-1

## (undated) DEVICE — TUBING ALARIS PUMP MODULE NON-DEHP

## (undated) DEVICE — FEEDING TUBE NG ARGYLE PVC 8FR 41CM ENFIT

## (undated) DEVICE — ELCTR GROUNDING PAD ADULT COVIDIEN

## (undated) DEVICE — BITE BLOCK ADULT 20 X 27MM (GREEN)

## (undated) DEVICE — HANDSET ARGON

## (undated) DEVICE — UNDERPAD LINEN SAVER 23 X 36"

## (undated) DEVICE — SUT PDS II 4-0 27" SH

## (undated) DEVICE — SUT SILK 2-0 18" PS

## (undated) DEVICE — SUT PDS II 5-0 30" RB-2

## (undated) DEVICE — SYR IV FLUSH SALINE 10ML 30/TY

## (undated) DEVICE — DRAIN PENROSE .25" X 18" LATEX

## (undated) DEVICE — KIT ENDO PROCEDURE CUSTOM

## (undated) DEVICE — SSH-ENTERSCOPE 2212194: Type: DURABLE MEDICAL EQUIPMENT

## (undated) DEVICE — DRAPE IOBAN 23" X 17"

## (undated) DEVICE — SENSOR O2 FINGER ADULT

## (undated) DEVICE — STAPLER COVIDIEN ENDO GIA SHORT HANDLE

## (undated) DEVICE — SUT SILK 3-0 30" TIES

## (undated) DEVICE — STAPLER COVIDIEN ENDO GIA STANDARD HANDLE

## (undated) DEVICE — SUT SILK 2-0 30" TIES

## (undated) DEVICE — DRSG CURITY GAUZE SPONGE 4 X 4" 12-PLY NON-STERILE

## (undated) DEVICE — MASK PROCED EARLOOP 50/BX LRC COVID ADD

## (undated) DEVICE — CATH IV SAFE BC 22G X 1" (BLUE)

## (undated) DEVICE — DRAIN RESERVOIR FOR JACKSON PRATT 100CC CARDINAL

## (undated) DEVICE — WARMING BLANKET FULL ADULT

## (undated) DEVICE — APPLICATOR FOR TISSEEL FLEX TIP 360 W SNAP LOCK 40CM